# Patient Record
Sex: MALE | Race: WHITE | Employment: OTHER | URBAN - METROPOLITAN AREA
[De-identification: names, ages, dates, MRNs, and addresses within clinical notes are randomized per-mention and may not be internally consistent; named-entity substitution may affect disease eponyms.]

---

## 2017-01-11 ENCOUNTER — TELEPHONE (OUTPATIENT)
Dept: OTOLARYNGOLOGY | Facility: CLINIC | Age: 82
End: 2017-01-11

## 2017-01-12 RX ORDER — FLUTICASONE PROPIONATE 50 MCG
2 SPRAY, SUSPENSION (ML) NASAL DAILY
Qty: 1 BOTTLE | Refills: 6 | Status: SHIPPED | OUTPATIENT
Start: 2017-01-12 | End: 2017-07-16

## 2017-02-01 ENCOUNTER — OFFICE VISIT (OUTPATIENT)
Dept: INTERNAL MEDICINE CLINIC | Facility: CLINIC | Age: 82
End: 2017-02-01

## 2017-02-01 VITALS
RESPIRATION RATE: 16 BRPM | WEIGHT: 181 LBS | DIASTOLIC BLOOD PRESSURE: 71 MMHG | HEART RATE: 76 BPM | BODY MASS INDEX: 25.34 KG/M2 | HEIGHT: 71 IN | SYSTOLIC BLOOD PRESSURE: 136 MMHG

## 2017-02-01 DIAGNOSIS — Z23 NEED FOR VACCINATION: ICD-10-CM

## 2017-02-01 DIAGNOSIS — E78.2 MIXED HYPERLIPIDEMIA: ICD-10-CM

## 2017-02-01 DIAGNOSIS — R80.9 PROTEINURIA, UNSPECIFIED TYPE: ICD-10-CM

## 2017-02-01 DIAGNOSIS — I10 ESSENTIAL HYPERTENSION WITH GOAL BLOOD PRESSURE LESS THAN 140/90: Primary | ICD-10-CM

## 2017-02-01 PROCEDURE — 99214 OFFICE O/P EST MOD 30 MIN: CPT | Performed by: INTERNAL MEDICINE

## 2017-02-01 PROCEDURE — G0463 HOSPITAL OUTPT CLINIC VISIT: HCPCS | Performed by: INTERNAL MEDICINE

## 2017-02-01 PROCEDURE — 90670 PCV13 VACCINE IM: CPT | Performed by: INTERNAL MEDICINE

## 2017-02-01 PROCEDURE — G0009 ADMIN PNEUMOCOCCAL VACCINE: HCPCS | Performed by: INTERNAL MEDICINE

## 2017-02-01 NOTE — PROGRESS NOTES
Cathy Moreau is a 80year old male. HPI:   1. Essential hypertension with goal blood pressure less than 140/90    Patient presents for recheck of his hypertension.  Pt has been taking medications as instructed, no medication side effects, home BP monit capsule Rfl: 1   NIFEdipine ER Osmotic (PROCARDIA XL) 30 MG Oral Tablet 24 Hr  Disp:  Rfl:    Lovastatin 40 MG Oral Tab Take  by mouth. Disp:  Rfl:    atenolol (TENORMIN) 25 MG Oral Tab Take  by mouth.  Disp:  Rfl:    docusate sodium (COLACE) 100 MG Oral Ca masses, HSM or tenderness  EXTREMITIES: no cyanosis, clubbing or edema    ASSESSMENT AND PLAN:   1. Essential hypertension with goal blood pressure less than 140/90    Pt presents for a recheck of his hypertension.  BP is well controlled, no significant med

## 2017-02-17 NOTE — TELEPHONE ENCOUNTER
Dr. Stanley Moyer, Rx requests for Allopurinol 300 mg and Tamsulosin HCL 0.4 mg, UNABLE to fill per protocol. Please review. Thank you.      Refill Protocol Appointment Criteria  · Appointment scheduled in the past 6 months or in the next 3 months  Recent Visit

## 2017-02-18 RX ORDER — ALLOPURINOL 300 MG/1
TABLET ORAL
Qty: 90 TABLET | Refills: 0 | Status: SHIPPED | OUTPATIENT
Start: 2017-02-18 | End: 2017-05-15

## 2017-02-18 RX ORDER — TAMSULOSIN HYDROCHLORIDE 0.4 MG/1
CAPSULE ORAL
Qty: 90 CAPSULE | Refills: 0 | Status: SHIPPED | OUTPATIENT
Start: 2017-02-18 | End: 2017-05-15

## 2017-04-13 ENCOUNTER — PRIOR ORIGINAL RECORDS (OUTPATIENT)
Dept: OTHER | Age: 82
End: 2017-04-13

## 2017-04-13 ENCOUNTER — LAB ENCOUNTER (OUTPATIENT)
Dept: LAB | Facility: HOSPITAL | Age: 82
End: 2017-04-13
Attending: INTERNAL MEDICINE
Payer: MEDICARE

## 2017-04-13 DIAGNOSIS — E78.00 HYPERCHOLESTEROLEMIA: Primary | ICD-10-CM

## 2017-04-13 PROCEDURE — 36415 COLL VENOUS BLD VENIPUNCTURE: CPT

## 2017-04-13 PROCEDURE — 82550 ASSAY OF CK (CPK): CPT

## 2017-04-13 PROCEDURE — 84450 TRANSFERASE (AST) (SGOT): CPT

## 2017-04-13 PROCEDURE — 80061 LIPID PANEL: CPT

## 2017-04-13 PROCEDURE — 84460 ALANINE AMINO (ALT) (SGPT): CPT

## 2017-05-15 ENCOUNTER — OFFICE VISIT (OUTPATIENT)
Dept: INTERNAL MEDICINE CLINIC | Facility: CLINIC | Age: 82
End: 2017-05-15

## 2017-05-15 VITALS
DIASTOLIC BLOOD PRESSURE: 64 MMHG | SYSTOLIC BLOOD PRESSURE: 119 MMHG | HEIGHT: 71 IN | WEIGHT: 179 LBS | BODY MASS INDEX: 25.06 KG/M2 | RESPIRATION RATE: 16 BRPM | HEART RATE: 63 BPM

## 2017-05-15 DIAGNOSIS — I10 ESSENTIAL HYPERTENSION WITH GOAL BLOOD PRESSURE LESS THAN 140/90: Primary | ICD-10-CM

## 2017-05-15 DIAGNOSIS — E78.2 MIXED HYPERLIPIDEMIA: ICD-10-CM

## 2017-05-15 PROCEDURE — G0463 HOSPITAL OUTPT CLINIC VISIT: HCPCS | Performed by: INTERNAL MEDICINE

## 2017-05-15 PROCEDURE — 99214 OFFICE O/P EST MOD 30 MIN: CPT | Performed by: INTERNAL MEDICINE

## 2017-05-15 RX ORDER — TAMSULOSIN HYDROCHLORIDE 0.4 MG/1
CAPSULE ORAL
Qty: 90 CAPSULE | Refills: 0 | Status: CANCELLED | OUTPATIENT
Start: 2017-05-15

## 2017-05-15 RX ORDER — TAMSULOSIN HYDROCHLORIDE 0.4 MG/1
0.4 CAPSULE ORAL DAILY
Qty: 90 CAPSULE | Refills: 3 | Status: SHIPPED | OUTPATIENT
Start: 2017-05-15 | End: 2018-05-14

## 2017-05-15 NOTE — PROGRESS NOTES
Refugio Martinez is a 80year old male. HPI:   1. Essential hypertension with goal blood pressure less than 140/90    Patient presents for recheck of his hypertension.  Pt has been taking medications as instructed, no medication side effects, home BP monit by Nasal route daily. Disp: 1 Bottle Rfl: 6   NIFEdipine ER Osmotic (PROCARDIA XL) 30 MG Oral Tablet 24 Hr  Disp:  Rfl:    Lovastatin 40 MG Oral Tab Take  by mouth. Disp:  Rfl:    atenolol (TENORMIN) 25 MG Oral Tab Take  by mouth.  Disp:  Rfl:    docusate s without murmur  GI: good BS's,no masses, HSM or tenderness  EXTREMITIES: no cyanosis, clubbing or edema    ASSESSMENT AND PLAN:   1.  Essential hypertension with goal blood pressure less than 140/90    Patient instructed to take anti-hypertensive medicines

## 2017-05-16 RX ORDER — ALLOPURINOL 300 MG/1
TABLET ORAL
Qty: 90 TABLET | Refills: 0 | Status: SHIPPED | OUTPATIENT
Start: 2017-05-16 | End: 2017-08-21

## 2017-07-20 RX ORDER — FLUTICASONE PROPIONATE 50 MCG
SPRAY, SUSPENSION (ML) NASAL
Qty: 1 BOTTLE | Refills: 3 | Status: SHIPPED | OUTPATIENT
Start: 2017-07-20 | End: 2019-05-20

## 2017-07-20 NOTE — TELEPHONE ENCOUNTER
Patient calling to check status on medication. States he is completely out and requesting refill from pharmacy 5 days ago. Would like it refilled asap. Please advise. Thank you.

## 2017-08-19 RX ORDER — ALLOPURINOL 300 MG/1
TABLET ORAL
Qty: 90 TABLET | Refills: 0 | Status: CANCELLED | OUTPATIENT
Start: 2017-08-19

## 2017-08-21 ENCOUNTER — OFFICE VISIT (OUTPATIENT)
Dept: INTERNAL MEDICINE CLINIC | Facility: CLINIC | Age: 82
End: 2017-08-21

## 2017-08-21 VITALS
WEIGHT: 176 LBS | HEART RATE: 63 BPM | HEIGHT: 70 IN | BODY MASS INDEX: 25.2 KG/M2 | RESPIRATION RATE: 16 BRPM | DIASTOLIC BLOOD PRESSURE: 66 MMHG | SYSTOLIC BLOOD PRESSURE: 138 MMHG

## 2017-08-21 DIAGNOSIS — E78.2 MIXED HYPERLIPIDEMIA: ICD-10-CM

## 2017-08-21 DIAGNOSIS — D69.6 THROMBOCYTOPENIA (HCC): ICD-10-CM

## 2017-08-21 DIAGNOSIS — I10 ESSENTIAL HYPERTENSION WITH GOAL BLOOD PRESSURE LESS THAN 140/90: Primary | ICD-10-CM

## 2017-08-21 LAB
ALT (SGPT): 15 U/L
AST (SGOT): 24 U/L
CHOLESTEROL, TOTAL: 165 MG/DL
HDL CHOLESTEROL: 57 MG/DL
LDL CHOLESTEROL: 98 MG/DL
NON-HDL CHOLESTEROL: 108 MG/DL
TRIGLYCERIDES: 50 MG/DL

## 2017-08-21 PROCEDURE — G0463 HOSPITAL OUTPT CLINIC VISIT: HCPCS | Performed by: INTERNAL MEDICINE

## 2017-08-21 PROCEDURE — 99214 OFFICE O/P EST MOD 30 MIN: CPT | Performed by: INTERNAL MEDICINE

## 2017-08-21 RX ORDER — OMEPRAZOLE 20 MG/1
20 CAPSULE, DELAYED RELEASE ORAL EVERY MORNING
Qty: 90 CAPSULE | Refills: 3 | Status: SHIPPED | OUTPATIENT
Start: 2017-08-21 | End: 2018-08-20

## 2017-08-21 RX ORDER — ALLOPURINOL 300 MG/1
300 TABLET ORAL DAILY
Qty: 90 TABLET | Refills: 3 | Status: SHIPPED | OUTPATIENT
Start: 2017-08-21 | End: 2018-08-20

## 2017-08-21 NOTE — PROGRESS NOTES
Sherryle Clark is a 80year old male. HPI:   1. Essential hypertension with goal blood pressure less than 140/90    Patient presents for recheck of his hypertension.  Pt has been taking medications as instructed, no medication side effects, home BP monit Take 1 capsule (0.4 mg total) by mouth daily. Disp: 90 capsule Rfl: 3   NIFEdipine ER Osmotic (PROCARDIA XL) 30 MG Oral Tablet 24 Hr  Disp:  Rfl:    Lovastatin 40 MG Oral Tab Take  by mouth. Disp:  Rfl:    atenolol (TENORMIN) 25 MG Oral Tab Take  by mouth. lesions  HEENT: atraumatic, normocephalic,ears and throat are clear  NECK: supple,no adenopathy,no bruits  LUNGS: clear to auscultation  CARDIO: RRR without murmur  GI: good BS's,no masses, HSM or tenderness  EXTREMITIES: no cyanosis, clubbing or edema

## 2017-08-22 ENCOUNTER — PRIOR ORIGINAL RECORDS (OUTPATIENT)
Dept: OTHER | Age: 82
End: 2017-08-22

## 2017-08-28 ENCOUNTER — LAB ENCOUNTER (OUTPATIENT)
Dept: LAB | Facility: HOSPITAL | Age: 82
End: 2017-08-28
Attending: INTERNAL MEDICINE
Payer: MEDICARE

## 2017-08-28 DIAGNOSIS — I10 ESSENTIAL HYPERTENSION WITH GOAL BLOOD PRESSURE LESS THAN 140/90: ICD-10-CM

## 2017-08-28 DIAGNOSIS — E78.2 MIXED HYPERLIPIDEMIA: ICD-10-CM

## 2017-08-28 LAB
ALBUMIN SERPL BCP-MCNC: 3.8 G/DL (ref 3.5–4.8)
ALBUMIN/GLOB SERPL: 1.1 {RATIO} (ref 1–2)
ALP SERPL-CCNC: 67 U/L (ref 32–100)
ALT SERPL-CCNC: 17 U/L (ref 17–63)
ANION GAP SERPL CALC-SCNC: 9 MMOL/L (ref 0–18)
AST SERPL-CCNC: 27 U/L (ref 15–41)
BACTERIA UR QL AUTO: NEGATIVE /HPF
BASOPHILS # BLD: 0.1 K/UL (ref 0–0.2)
BASOPHILS NFR BLD: 1 %
BILIRUB SERPL-MCNC: 1.1 MG/DL (ref 0.3–1.2)
BILIRUB UR QL: NEGATIVE
BUN SERPL-MCNC: 9 MG/DL (ref 8–20)
BUN/CREAT SERPL: 9.3 (ref 10–20)
CALCIUM SERPL-MCNC: 9.6 MG/DL (ref 8.5–10.5)
CHLORIDE SERPL-SCNC: 103 MMOL/L (ref 95–110)
CHOLEST SERPL-MCNC: 157 MG/DL (ref 110–200)
CLARITY UR: CLEAR
CO2 SERPL-SCNC: 27 MMOL/L (ref 22–32)
COLOR UR: YELLOW
CREAT SERPL-MCNC: 0.97 MG/DL (ref 0.5–1.5)
EOSINOPHIL # BLD: 0.4 K/UL (ref 0–0.7)
EOSINOPHIL NFR BLD: 5 %
ERYTHROCYTE [DISTWIDTH] IN BLOOD BY AUTOMATED COUNT: 14.8 % (ref 11–15)
GLOBULIN PLAS-MCNC: 3.5 G/DL (ref 2.5–3.7)
GLUCOSE SERPL-MCNC: 106 MG/DL (ref 70–99)
GLUCOSE UR-MCNC: NEGATIVE MG/DL
HCT VFR BLD AUTO: 46.4 % (ref 41–52)
HDLC SERPL-MCNC: 55 MG/DL
HGB BLD-MCNC: 15.2 G/DL (ref 13.5–17.5)
HGB UR QL STRIP.AUTO: NEGATIVE
HYALINE CASTS #/AREA URNS AUTO: 1 /LPF
KETONES UR-MCNC: NEGATIVE MG/DL
LDLC SERPL CALC-MCNC: 91 MG/DL (ref 0–99)
LEUKOCYTE ESTERASE UR QL STRIP.AUTO: NEGATIVE
LYMPHOCYTES # BLD: 2 K/UL (ref 1–4)
LYMPHOCYTES NFR BLD: 27 %
MCH RBC QN AUTO: 32.1 PG (ref 27–32)
MCHC RBC AUTO-ENTMCNC: 32.8 G/DL (ref 32–37)
MCV RBC AUTO: 97.8 FL (ref 80–100)
MONOCYTES # BLD: 0.7 K/UL (ref 0–1)
MONOCYTES NFR BLD: 10 %
NEUTROPHILS # BLD AUTO: 4.2 K/UL (ref 1.8–7.7)
NEUTROPHILS NFR BLD: 57 %
NITRITE UR QL STRIP.AUTO: NEGATIVE
NONHDLC SERPL-MCNC: 102 MG/DL
OSMOLALITY UR CALC.SUM OF ELEC: 287 MOSM/KG (ref 275–295)
PH UR: 7 [PH] (ref 5–8)
PLATELET # BLD AUTO: 136 K/UL (ref 140–400)
PMV BLD AUTO: 8.9 FL (ref 7.4–10.3)
POTASSIUM SERPL-SCNC: 4.4 MMOL/L (ref 3.3–5.1)
PROT SERPL-MCNC: 7.3 G/DL (ref 5.9–8.4)
PROT UR-MCNC: 30 MG/DL
RBC # BLD AUTO: 4.75 M/UL (ref 4.5–5.9)
RBC #/AREA URNS AUTO: <1 /HPF
SODIUM SERPL-SCNC: 139 MMOL/L (ref 136–144)
SP GR UR STRIP: 1.01 (ref 1–1.03)
TRIGL SERPL-MCNC: 57 MG/DL (ref 1–149)
TSH SERPL-ACNC: 3.58 UIU/ML (ref 0.45–5.33)
UROBILINOGEN UR STRIP-ACNC: <2
VIT C UR-MCNC: NEGATIVE MG/DL
WBC # BLD AUTO: 7.4 K/UL (ref 4–11)
WBC #/AREA URNS AUTO: 1 /HPF

## 2017-08-28 PROCEDURE — 80061 LIPID PANEL: CPT

## 2017-08-28 PROCEDURE — 85025 COMPLETE CBC W/AUTO DIFF WBC: CPT

## 2017-08-28 PROCEDURE — 84443 ASSAY THYROID STIM HORMONE: CPT

## 2017-08-28 PROCEDURE — 36415 COLL VENOUS BLD VENIPUNCTURE: CPT

## 2017-08-28 PROCEDURE — 81001 URINALYSIS AUTO W/SCOPE: CPT

## 2017-08-28 PROCEDURE — 80053 COMPREHEN METABOLIC PANEL: CPT

## 2017-09-09 ENCOUNTER — TELEPHONE (OUTPATIENT)
Dept: OTHER | Age: 82
End: 2017-09-09

## 2017-09-09 NOTE — TELEPHONE ENCOUNTER
----- Message from Christopher Kline MD sent at 9/8/2017 11:08 AM CDT -----  Blood test is good with normal liver, thyroid, urine, lipids and kidney tests. Blood sugar a little up. We'll watch it.  Platelets are better than previous but still a little bel

## 2017-11-20 ENCOUNTER — OFFICE VISIT (OUTPATIENT)
Dept: INTERNAL MEDICINE CLINIC | Facility: CLINIC | Age: 82
End: 2017-11-20

## 2017-11-20 VITALS
BODY MASS INDEX: 24.64 KG/M2 | HEIGHT: 71 IN | DIASTOLIC BLOOD PRESSURE: 70 MMHG | HEART RATE: 67 BPM | RESPIRATION RATE: 16 BRPM | SYSTOLIC BLOOD PRESSURE: 133 MMHG | WEIGHT: 176 LBS

## 2017-11-20 DIAGNOSIS — I10 ESSENTIAL HYPERTENSION WITH GOAL BLOOD PRESSURE LESS THAN 140/90: Primary | ICD-10-CM

## 2017-11-20 DIAGNOSIS — D69.6 THROMBOCYTOPENIA (HCC): ICD-10-CM

## 2017-11-20 DIAGNOSIS — E78.2 MIXED HYPERLIPIDEMIA: ICD-10-CM

## 2017-11-20 PROCEDURE — G0463 HOSPITAL OUTPT CLINIC VISIT: HCPCS | Performed by: INTERNAL MEDICINE

## 2017-11-20 PROCEDURE — 99214 OFFICE O/P EST MOD 30 MIN: CPT | Performed by: INTERNAL MEDICINE

## 2017-11-20 NOTE — PROGRESS NOTES
Santhosh Daughters is a 80year old male. HPI:   1. Essential hypertension with goal blood pressure less than 140/90    Patient presents for recheck of his hypertension.  Pt has been taking medications as instructed, no medication side effects, home BP monit SPRAY(S) IN EACH NOSTRIL ONCE DAILY Disp: 1 Bottle Rfl: 3   tamsulosin HCl 0.4 MG Oral Cap Take 1 capsule (0.4 mg total) by mouth daily.  Disp: 90 capsule Rfl: 3   NIFEdipine ER Osmotic (PROCARDIA XL) 30 MG Oral Tablet 24 Hr  Disp:  Rfl:    Lovastatin 40 MG no apparent distress/ using a rolling walker  SKIN: no rashes,no suspicious lesions  HEENT: atraumatic, normocephalic,ears and throat are clear  NECK: supple,no adenopathy,no bruits  LUNGS: clear to auscultation  CARDIO: RRR without murmur  GI: good BS's,n

## 2017-11-21 ENCOUNTER — TELEPHONE (OUTPATIENT)
Dept: INTERNAL MEDICINE CLINIC | Facility: CLINIC | Age: 82
End: 2017-11-21

## 2018-03-05 ENCOUNTER — OFFICE VISIT (OUTPATIENT)
Dept: INTERNAL MEDICINE CLINIC | Facility: CLINIC | Age: 83
End: 2018-03-05

## 2018-03-05 VITALS
RESPIRATION RATE: 16 BRPM | BODY MASS INDEX: 25.06 KG/M2 | HEIGHT: 71 IN | DIASTOLIC BLOOD PRESSURE: 69 MMHG | WEIGHT: 179 LBS | SYSTOLIC BLOOD PRESSURE: 127 MMHG | HEART RATE: 76 BPM

## 2018-03-05 DIAGNOSIS — Z23 NEED FOR VACCINATION: ICD-10-CM

## 2018-03-05 DIAGNOSIS — I10 ESSENTIAL HYPERTENSION WITH GOAL BLOOD PRESSURE LESS THAN 140/90: Primary | ICD-10-CM

## 2018-03-05 DIAGNOSIS — D69.6 THROMBOCYTOPENIA (HCC): ICD-10-CM

## 2018-03-05 DIAGNOSIS — E78.2 MIXED HYPERLIPIDEMIA: ICD-10-CM

## 2018-03-05 PROCEDURE — 90732 PPSV23 VACC 2 YRS+ SUBQ/IM: CPT | Performed by: INTERNAL MEDICINE

## 2018-03-05 PROCEDURE — 99214 OFFICE O/P EST MOD 30 MIN: CPT | Performed by: INTERNAL MEDICINE

## 2018-03-05 PROCEDURE — G0463 HOSPITAL OUTPT CLINIC VISIT: HCPCS | Performed by: INTERNAL MEDICINE

## 2018-03-05 PROCEDURE — G0009 ADMIN PNEUMOCOCCAL VACCINE: HCPCS | Performed by: INTERNAL MEDICINE

## 2018-03-05 NOTE — PROGRESS NOTES
Ayana Medrano is a 80year old male. HPI:   1. Essential hypertension with goal blood pressure less than 140/90    Patient presents for recheck of his hypertension.  Pt has been taking medications as instructed, no medication side effects, home BP monit USE TWO SPRAY(S) IN EACH NOSTRIL ONCE DAILY Disp: 1 Bottle Rfl: 3   tamsulosin HCl 0.4 MG Oral Cap Take 1 capsule (0.4 mg total) by mouth daily.  Disp: 90 capsule Rfl: 3   NIFEdipine ER Osmotic (PROCARDIA XL) 30 MG Oral Tablet 24 Hr  Disp:  Rfl:    Lisa Farias nourished,in no apparent distress/ using a rolling walker  SKIN: no rashes,no suspicious lesions  HEENT: atraumatic, normocephalic,ears and throat are clear  NECK: supple,no adenopathy,no bruits  LUNGS: clear to auscultation  CARDIO: RRR without murmur  GI

## 2018-03-12 ENCOUNTER — TELEPHONE (OUTPATIENT)
Dept: INTERNAL MEDICINE CLINIC | Facility: CLINIC | Age: 83
End: 2018-03-12

## 2018-03-12 NOTE — TELEPHONE ENCOUNTER
Spoke with patient (name and  verified), reviewed information, patient verbalized understanding and agrees with plan  Patient calling to ask if there is an open order for CBC.  Chart reviewed, CBC ordered 3/5/18, advised patient order is open and no fast

## 2018-05-15 RX ORDER — TAMSULOSIN HYDROCHLORIDE 0.4 MG/1
CAPSULE ORAL
Qty: 90 CAPSULE | Refills: 3 | Status: SHIPPED | OUTPATIENT
Start: 2018-05-15 | End: 2019-05-29

## 2018-08-06 ENCOUNTER — LAB ENCOUNTER (OUTPATIENT)
Dept: LAB | Facility: HOSPITAL | Age: 83
End: 2018-08-06
Attending: INTERNAL MEDICINE
Payer: MEDICARE

## 2018-08-06 ENCOUNTER — OFFICE VISIT (OUTPATIENT)
Dept: INTERNAL MEDICINE CLINIC | Facility: CLINIC | Age: 83
End: 2018-08-06
Payer: MEDICARE

## 2018-08-06 VITALS
HEART RATE: 56 BPM | DIASTOLIC BLOOD PRESSURE: 71 MMHG | RESPIRATION RATE: 16 BRPM | WEIGHT: 174 LBS | HEIGHT: 71 IN | BODY MASS INDEX: 24.36 KG/M2 | SYSTOLIC BLOOD PRESSURE: 132 MMHG

## 2018-08-06 DIAGNOSIS — D69.6 THROMBOCYTOPENIA (HCC): ICD-10-CM

## 2018-08-06 DIAGNOSIS — E78.2 MIXED HYPERLIPIDEMIA: ICD-10-CM

## 2018-08-06 DIAGNOSIS — I10 ESSENTIAL HYPERTENSION WITH GOAL BLOOD PRESSURE LESS THAN 140/90: Primary | ICD-10-CM

## 2018-08-06 LAB
BASOPHILS # BLD: 0.1 K/UL (ref 0–0.2)
BASOPHILS NFR BLD: 1 %
EOSINOPHIL # BLD: 0.4 K/UL (ref 0–0.7)
EOSINOPHIL NFR BLD: 4 %
ERYTHROCYTE [DISTWIDTH] IN BLOOD BY AUTOMATED COUNT: 15.1 % (ref 11–15)
HCT VFR BLD AUTO: 45.1 % (ref 41–52)
HGB BLD-MCNC: 14.7 G/DL (ref 13.5–17.5)
LYMPHOCYTES # BLD: 2.1 K/UL (ref 1–4)
LYMPHOCYTES NFR BLD: 24 %
MCH RBC QN AUTO: 32 PG (ref 27–32)
MCHC RBC AUTO-ENTMCNC: 32.6 G/DL (ref 32–37)
MCV RBC AUTO: 98.2 FL (ref 80–100)
MONOCYTES # BLD: 1 K/UL (ref 0–1)
MONOCYTES NFR BLD: 11 %
NEUTROPHILS # BLD AUTO: 5.2 K/UL (ref 1.8–7.7)
NEUTROPHILS NFR BLD: 60 %
PLATELET # BLD AUTO: 123 K/UL (ref 140–400)
PMV BLD AUTO: 8.9 FL (ref 7.4–10.3)
RBC # BLD AUTO: 4.59 M/UL (ref 4.5–5.9)
WBC # BLD AUTO: 8.6 K/UL (ref 4–11)

## 2018-08-06 PROCEDURE — 99214 OFFICE O/P EST MOD 30 MIN: CPT | Performed by: INTERNAL MEDICINE

## 2018-08-06 PROCEDURE — 85025 COMPLETE CBC W/AUTO DIFF WBC: CPT

## 2018-08-06 PROCEDURE — 36415 COLL VENOUS BLD VENIPUNCTURE: CPT

## 2018-08-06 PROCEDURE — G0463 HOSPITAL OUTPT CLINIC VISIT: HCPCS | Performed by: INTERNAL MEDICINE

## 2018-08-06 NOTE — PROGRESS NOTES
Sherryle Clark is a 80year old male. HPI:   1. Essential hypertension with goal blood pressure less than 140/90    Patient presents for recheck of his hypertension.  Pt has been taking medications as instructed, no medication side effects, home BP monit mouth every morning.  Disp: 90 capsule Rfl: 3   FLUTICASONE PROPIONATE 50 MCG/ACT Nasal Suspension USE TWO SPRAY(S) IN EACH NOSTRIL ONCE DAILY Disp: 1 Bottle Rfl: 3   NIFEdipine ER Osmotic (PROCARDIA XL) 30 MG Oral Tablet 24 Hr  Disp:  Rfl:    Lovastatin 40 suspicious lesions  HEENT: atraumatic, normocephalic,ears and throat are clear  NECK: supple,no adenopathy,no bruits  LUNGS: clear to auscultation  CARDIO: RRR without murmur  GI: good BS's,no masses, HSM or tenderness  EXTREMITIES: no cyanosis, clubbing o

## 2018-08-20 RX ORDER — OMEPRAZOLE 20 MG/1
CAPSULE, DELAYED RELEASE ORAL
Qty: 90 CAPSULE | Refills: 3 | Status: SHIPPED | OUTPATIENT
Start: 2018-08-20 | End: 2019-07-06

## 2018-08-20 RX ORDER — ALLOPURINOL 300 MG/1
TABLET ORAL
Qty: 90 TABLET | Refills: 3 | Status: SHIPPED | OUTPATIENT
Start: 2018-08-20 | End: 2019-09-02

## 2018-08-24 ENCOUNTER — PRIOR ORIGINAL RECORDS (OUTPATIENT)
Dept: OTHER | Age: 83
End: 2018-08-24

## 2018-08-24 ENCOUNTER — APPOINTMENT (OUTPATIENT)
Dept: LAB | Facility: HOSPITAL | Age: 83
End: 2018-08-24
Attending: INTERNAL MEDICINE
Payer: MEDICARE

## 2018-08-24 DIAGNOSIS — E78.00 PURE HYPERCHOLESTEROLEMIA: ICD-10-CM

## 2018-08-24 DIAGNOSIS — I25.119 ATHEROSCLEROSIS OF NATIVE CORONARY ARTERY OF NATIVE HEART WITH ANGINA PECTORIS (HCC): ICD-10-CM

## 2018-08-24 LAB
ALT SERPL-CCNC: 22 U/L (ref 17–63)
ANION GAP SERPL CALC-SCNC: 8 MMOL/L (ref 0–18)
AST SERPL-CCNC: 39 U/L (ref 15–41)
BUN SERPL-MCNC: 20 MG/DL (ref 8–20)
BUN/CREAT SERPL: 19.4 (ref 10–20)
CALCIUM SERPL-MCNC: 9.4 MG/DL (ref 8.5–10.5)
CHLORIDE SERPL-SCNC: 102 MMOL/L (ref 95–110)
CHOLEST SERPL-MCNC: 135 MG/DL (ref 110–200)
CK SERPL-CCNC: 97 U/L (ref 49–397)
CO2 SERPL-SCNC: 30 MMOL/L (ref 22–32)
CREAT SERPL-MCNC: 1.03 MG/DL (ref 0.5–1.5)
GLUCOSE SERPL-MCNC: 95 MG/DL (ref 70–99)
HDLC SERPL-MCNC: 48 MG/DL
LDLC SERPL CALC-MCNC: 79 MG/DL (ref 0–99)
NONHDLC SERPL-MCNC: 87 MG/DL
OSMOLALITY UR CALC.SUM OF ELEC: 292 MOSM/KG (ref 275–295)
POTASSIUM SERPL-SCNC: 4.3 MMOL/L (ref 3.3–5.1)
SODIUM SERPL-SCNC: 140 MMOL/L (ref 136–144)
TRIGL SERPL-MCNC: 42 MG/DL (ref 1–149)

## 2018-08-24 PROCEDURE — 80061 LIPID PANEL: CPT

## 2018-08-24 PROCEDURE — 80048 BASIC METABOLIC PNL TOTAL CA: CPT

## 2018-08-24 PROCEDURE — 84460 ALANINE AMINO (ALT) (SGPT): CPT

## 2018-08-24 PROCEDURE — 84450 TRANSFERASE (AST) (SGOT): CPT

## 2018-08-24 PROCEDURE — 36415 COLL VENOUS BLD VENIPUNCTURE: CPT

## 2018-08-24 PROCEDURE — 82550 ASSAY OF CK (CPK): CPT

## 2018-08-27 LAB
ALT (SGPT): 22 U/L
AST (SGOT): 39 U/L
BUN: 20 MG/DL
CALCIUM: 9.4 MG/DL
CHLORIDE: 102 MEQ/L
CHOLESTEROL, TOTAL: 135 MG/DL
CREATININE KINASE: 97 U/L
CREATININE, SERUM: 1.03 MG/DL
GLUCOSE: 95 MG/DL
GLUCOSE: 95 MG/DL
HDL CHOLESTEROL: 48 MG/DL
LDL CHOLESTEROL: 79 MG/DL
NON-HDL CHOLESTEROL: 87 MG/DL
POTASSIUM, SERUM: 4.3 MEQ/L
SGOT (AST): 39 IU/L
SGPT (ALT): 22 IU/L
SODIUM: 140 MEQ/L
TRIGLYCERIDES: 42 MG/DL

## 2018-08-29 ENCOUNTER — PRIOR ORIGINAL RECORDS (OUTPATIENT)
Dept: OTHER | Age: 83
End: 2018-08-29

## 2018-10-05 ENCOUNTER — PRIOR ORIGINAL RECORDS (OUTPATIENT)
Dept: OTHER | Age: 83
End: 2018-10-05

## 2018-10-05 ENCOUNTER — MYAURORA ACCOUNT LINK (OUTPATIENT)
Dept: OTHER | Age: 83
End: 2018-10-05

## 2018-12-10 ENCOUNTER — OFFICE VISIT (OUTPATIENT)
Dept: INTERNAL MEDICINE CLINIC | Facility: CLINIC | Age: 83
End: 2018-12-10
Payer: MEDICARE

## 2018-12-10 ENCOUNTER — LAB ENCOUNTER (OUTPATIENT)
Dept: LAB | Facility: HOSPITAL | Age: 83
End: 2018-12-10
Attending: INTERNAL MEDICINE
Payer: MEDICARE

## 2018-12-10 VITALS
DIASTOLIC BLOOD PRESSURE: 72 MMHG | SYSTOLIC BLOOD PRESSURE: 121 MMHG | BODY MASS INDEX: 25.91 KG/M2 | HEART RATE: 59 BPM | HEIGHT: 70 IN | RESPIRATION RATE: 16 BRPM | WEIGHT: 181 LBS

## 2018-12-10 DIAGNOSIS — I10 ESSENTIAL HYPERTENSION WITH GOAL BLOOD PRESSURE LESS THAN 140/90: ICD-10-CM

## 2018-12-10 DIAGNOSIS — D69.6 THROMBOCYTOPENIA (HCC): ICD-10-CM

## 2018-12-10 DIAGNOSIS — E78.2 MIXED HYPERLIPIDEMIA: ICD-10-CM

## 2018-12-10 DIAGNOSIS — H61.21 IMPACTED CERUMEN OF RIGHT EAR: ICD-10-CM

## 2018-12-10 DIAGNOSIS — I10 ESSENTIAL HYPERTENSION WITH GOAL BLOOD PRESSURE LESS THAN 140/90: Primary | ICD-10-CM

## 2018-12-10 PROCEDURE — 69210 REMOVE IMPACTED EAR WAX UNI: CPT | Performed by: INTERNAL MEDICINE

## 2018-12-10 PROCEDURE — 85025 COMPLETE CBC W/AUTO DIFF WBC: CPT

## 2018-12-10 PROCEDURE — 84443 ASSAY THYROID STIM HORMONE: CPT

## 2018-12-10 PROCEDURE — 36415 COLL VENOUS BLD VENIPUNCTURE: CPT

## 2018-12-10 PROCEDURE — G0463 HOSPITAL OUTPT CLINIC VISIT: HCPCS | Performed by: INTERNAL MEDICINE

## 2018-12-10 PROCEDURE — 80053 COMPREHEN METABOLIC PANEL: CPT

## 2018-12-10 PROCEDURE — 99214 OFFICE O/P EST MOD 30 MIN: CPT | Performed by: INTERNAL MEDICINE

## 2018-12-10 NOTE — PROGRESS NOTES
Betina Mauricio is a 80year old male. HPI:   1. Essential hypertension with goal blood pressure less than 140/90    Patient presents for recheck of his hypertension.  Pt has been taking medications as instructed, no medication side effects, home BP monit CAPSULE BY MOUTH ONCE DAILY Disp: 90 capsule Rfl: 3   FLUTICASONE PROPIONATE 50 MCG/ACT Nasal Suspension USE TWO SPRAY(S) IN EACH NOSTRIL ONCE DAILY Disp: 1 Bottle Rfl: 3   NIFEdipine ER Osmotic (PROCARDIA XL) 30 MG Oral Tablet 24 Hr  Disp:  Rfl:    Juan A Gandara apparent distress/ using a rolling walker  SKIN: no rashes,no suspicious lesions  HEENT: atraumatic, normocephalic, throat is clear  EARS: Right cerumen impaction  NECK: supple,no adenopathy,no bruits  LUNGS: clear to auscultation  CARDIO: RRR without murm months

## 2018-12-12 ENCOUNTER — TELEPHONE (OUTPATIENT)
Dept: INTERNAL MEDICINE CLINIC | Facility: CLINIC | Age: 83
End: 2018-12-12

## 2018-12-12 NOTE — TELEPHONE ENCOUNTER
Patient returned call, advised of notes below per Dr NAILS, with understanding. Also advise results have been mailed out, as requested. Notes recorded by Aiyana Franco RN on 12/11/2018 at 2:55 PM CST  Left message on home phone for patient to call back.

## 2019-01-01 ENCOUNTER — EXTERNAL RECORD (OUTPATIENT)
Dept: HEALTH INFORMATION MANAGEMENT | Facility: OTHER | Age: 84
End: 2019-01-01

## 2019-02-13 ENCOUNTER — NURSE TRIAGE (OUTPATIENT)
Dept: OTHER | Age: 84
End: 2019-02-13

## 2019-02-13 NOTE — TELEPHONE ENCOUNTER
Action Requested: Summary for Provider     []  Critical Lab, Recommendations Needed  [] Need Additional Advice  []   FYI    []   Need Orders  [] Need Medications Sent to Pharmacy  []  Other     SUMMARY: pt asked to have doctor call him today, pt is having

## 2019-02-14 ENCOUNTER — HOSPITAL ENCOUNTER (EMERGENCY)
Facility: HOSPITAL | Age: 84
Discharge: HOME | End: 2019-02-14
Attending: EMERGENCY MEDICINE
Payer: MEDICARE

## 2019-02-14 ENCOUNTER — APPOINTMENT (OUTPATIENT)
Dept: GENERAL RADIOLOGY | Facility: HOSPITAL | Age: 84
End: 2019-02-14
Attending: EMERGENCY MEDICINE
Payer: MEDICARE

## 2019-02-14 VITALS
TEMPERATURE: 98 F | RESPIRATION RATE: 14 BRPM | OXYGEN SATURATION: 100 % | SYSTOLIC BLOOD PRESSURE: 173 MMHG | BODY MASS INDEX: 26 KG/M2 | WEIGHT: 181 LBS | HEART RATE: 56 BPM | DIASTOLIC BLOOD PRESSURE: 67 MMHG

## 2019-02-14 DIAGNOSIS — M54.31 SCIATICA OF RIGHT SIDE: Primary | ICD-10-CM

## 2019-02-14 PROCEDURE — 99283 EMERGENCY DEPT VISIT LOW MDM: CPT

## 2019-02-14 PROCEDURE — 73502 X-RAY EXAM HIP UNI 2-3 VIEWS: CPT | Performed by: EMERGENCY MEDICINE

## 2019-02-14 RX ORDER — METHYLPREDNISOLONE 4 MG/1
TABLET ORAL
Qty: 1 PACKAGE | Refills: 0 | Status: SHIPPED | OUTPATIENT
Start: 2019-02-14 | End: 2019-02-19

## 2019-02-14 NOTE — ED INITIAL ASSESSMENT (HPI)
C/o right hip pain, radiates to back and into groin. Hard to lay flat in bed, has trouble walking. Started approx 2 weeks ago, getting worse.

## 2019-02-14 NOTE — ED PROVIDER NOTES
Patient Seen in: [facility] AND [facility] Emergency Department    History   Patient presents with:  Lower Extremity Injury (musculoskeletal)    Stated Complaint: R pelvic pain x2 weeks    HPI    80-year-old male with history of hypertension, cryptic cirrhosis, ED Triage Vitals [02/14/19 1110]   BP (!) 173/67   Pulse 56   Resp 14   Temp 97.8 °F (36.6 °C)   Temp src Temporal   SpO2 100 %   O2 Device        Current:BP (!) 173/67   Pulse 56   Temp 97.8 °F (36.6 °C) (Temporal)   Resp 14   Wt 82.1 kg   SpO2 100% schedule follow up care with a primary care provider within the next three months to obtain basic health screening including reassessment of your blood pressure.     Medications Prescribed:  Current Discharge Medication List    START taking these medication

## 2019-02-19 PROBLEM — M16.11 PRIMARY OSTEOARTHRITIS OF RIGHT HIP: Status: ACTIVE | Noted: 2019-02-19

## 2019-02-19 PROBLEM — M70.61 TROCHANTERIC BURSITIS OF RIGHT HIP: Status: ACTIVE | Noted: 2019-02-19

## 2019-02-21 ENCOUNTER — OFFICE VISIT (OUTPATIENT)
Dept: DERMATOLOGY CLINIC | Facility: CLINIC | Age: 84
End: 2019-02-21
Payer: MEDICARE

## 2019-02-21 DIAGNOSIS — D48.5 NEOPLASM OF UNCERTAIN BEHAVIOR OF SKIN: ICD-10-CM

## 2019-02-21 DIAGNOSIS — L57.0 ACTINIC KERATOSIS: Primary | ICD-10-CM

## 2019-02-21 DIAGNOSIS — Z85.828 PERSONAL HISTORY OF SKIN CANCER: ICD-10-CM

## 2019-02-21 DIAGNOSIS — L82.1 SEBORRHEIC KERATOSES: ICD-10-CM

## 2019-02-21 PROCEDURE — 17000 DESTRUCT PREMALG LESION: CPT | Performed by: DERMATOLOGY

## 2019-02-21 PROCEDURE — 88305 TISSUE EXAM BY PATHOLOGIST: CPT | Performed by: DERMATOLOGY

## 2019-02-21 PROCEDURE — 99202 OFFICE O/P NEW SF 15 MIN: CPT | Performed by: DERMATOLOGY

## 2019-02-21 PROCEDURE — 17003 DESTRUCT PREMALG LES 2-14: CPT | Performed by: DERMATOLOGY

## 2019-02-21 PROCEDURE — 11103 TANGNTL BX SKIN EA SEP/ADDL: CPT | Performed by: DERMATOLOGY

## 2019-02-21 PROCEDURE — 11102 TANGNTL BX SKIN SINGLE LES: CPT | Performed by: DERMATOLOGY

## 2019-02-21 NOTE — PROGRESS NOTES
Past Medical History:   Diagnosis Date   • Bilateral inguinal hernia     observation   • Cancer of kidney ([facility] [address] 2005   • Cholecystitis 2013    NPO, IV antibiotics   • Cirrhosis ([facility] [address] 2013    per NG: cryptic cirrhosis; observation   • Diverticulosis     die Intimate partner violence:        Fear of current or ex partner: Not on file        Emotionally abused: Not on file        Physically abused: Not on file        Forced sexual activity: Not on file    Other Topics      Concerns:         Service: Not

## 2019-02-21 NOTE — PROGRESS NOTES
HPI:     Chief Complaint     Lesion        HPI     Lesion      Additional comments: New Patient present with red scaley lesion on right side of temple . Patient noticed lesion 6 month ago. Patient denies any change in soap .  Patient has hx of sc by mouth.  Disp:  Rfl:      Allergies:   No Known Allergies    Past Medical History:   Diagnosis Date   • Bilateral inguinal hernia     observation   • Cancer of kidney ([facility] 2005   • Cholecystitis 2013    NPO, IV antibiotics   • Cirrhosis ([facility] 2013    pe organizations: Not on file        Relationship status: Not on file      Intimate partner violence:        Fear of current or ex partner: Not on file        Emotionally abused: Not on file        Physically abused: Not on file        Forced sexual activity: lesion      ASSESSMENT/PLAN:   Actinic keratosis  (primary encounter diagnosis)-lesion helix and antihelix treated with cryotherapy.   Diagnosis discussed–post cryo directions given  Neoplasm of uncertain behavior of skin-rule out squamous cell carcinoma ve

## 2019-02-23 PROBLEM — Z96.642 HISTORY OF TOTAL LEFT HIP REPLACEMENT: Status: ACTIVE | Noted: 2019-02-23

## 2019-02-23 PROBLEM — M54.31 SCIATICA OF RIGHT SIDE: Status: ACTIVE | Noted: 2019-02-23

## 2019-02-25 NOTE — PROGRESS NOTES
Patient's dauhghter and Pt both informed of test results and all LSS' recommendations. Voiced understanding. Path report faxed to Dr. Harpal Nation and appt made for [facility]. Results logged in.

## 2019-02-28 VITALS
DIASTOLIC BLOOD PRESSURE: 50 MMHG | SYSTOLIC BLOOD PRESSURE: 136 MMHG | HEART RATE: 67 BPM | HEIGHT: 70 IN | BODY MASS INDEX: 25.34 KG/M2 | WEIGHT: 177 LBS | OXYGEN SATURATION: 98 %

## 2019-02-28 VITALS — DIASTOLIC BLOOD PRESSURE: 56 MMHG | SYSTOLIC BLOOD PRESSURE: 148 MMHG | WEIGHT: 172 LBS | HEART RATE: 68 BPM

## 2019-03-07 PROBLEM — M48.062 SPINAL STENOSIS OF LUMBAR REGION WITH NEUROGENIC CLAUDICATION: Status: ACTIVE | Noted: 2019-03-07

## 2019-03-12 ENCOUNTER — OFFICE VISIT (OUTPATIENT)
Dept: OTOLARYNGOLOGY | Facility: CLINIC | Age: 84
End: 2019-03-12
Payer: MEDICARE

## 2019-03-12 VITALS
HEIGHT: 70.5 IN | TEMPERATURE: 98 F | SYSTOLIC BLOOD PRESSURE: 138 MMHG | BODY MASS INDEX: 26 KG/M2 | DIASTOLIC BLOOD PRESSURE: 74 MMHG

## 2019-03-12 DIAGNOSIS — C44.202 CANCER OF SKIN OF RIGHT EAR: ICD-10-CM

## 2019-03-12 DIAGNOSIS — C44.309: Primary | ICD-10-CM

## 2019-03-12 PROCEDURE — G0463 HOSPITAL OUTPT CLINIC VISIT: HCPCS | Performed by: OTOLARYNGOLOGY

## 2019-03-12 PROCEDURE — 99214 OFFICE O/P EST MOD 30 MIN: CPT | Performed by: OTOLARYNGOLOGY

## 2019-03-12 NOTE — PROGRESS NOTES
Idania Garza is a 80year old male.   Patient presents with:  Lesion: right earlobe, biopsy done on 2-21-19 by Dr Brenda Ballard showed basal cell carcinoma       HISTORY OF PRESENT ILLNESS  He presents with a history of a lesion of the right ear and temple biop • Unspecified essential hypertension 2009       Past Surgical History:   Procedure Laterality Date   • CARPAL TUNNEL RELEASE Right 2009   • ELECTROCARDIOGRAM, COMPLETE  4-    scanned to media tab   • HIP REPLACEMENT SURGERY Left 1/5/2000    total temple from a previous biopsy. 0.8 cm area of erythema of the right ear from previous biopsy site. Lymph Detail Normal Submental. Submandibular. Anterior cervical. Posterior cervical. Supraclavicular.         Nose/Mouth/Throat Normal External nose - reconstruction. I did offer a quick excision and closure under local anesthetic as well while I am waiting for the results of his frozen section analysis. We will go ahead and set up with both of these to be done at his convenience in the near future.   Elvis Bass

## 2019-03-18 ENCOUNTER — OFFICE VISIT (OUTPATIENT)
Dept: INTERNAL MEDICINE CLINIC | Facility: CLINIC | Age: 84
End: 2019-03-18
Payer: MEDICARE

## 2019-03-18 ENCOUNTER — LAB ENCOUNTER (OUTPATIENT)
Dept: LAB | Facility: HOSPITAL | Age: 84
End: 2019-03-18
Attending: INTERNAL MEDICINE
Payer: MEDICARE

## 2019-03-18 VITALS
HEIGHT: 70 IN | HEART RATE: 76 BPM | DIASTOLIC BLOOD PRESSURE: 74 MMHG | WEIGHT: 164 LBS | SYSTOLIC BLOOD PRESSURE: 132 MMHG | RESPIRATION RATE: 16 BRPM | BODY MASS INDEX: 23.48 KG/M2

## 2019-03-18 DIAGNOSIS — R41.3 MEMORY LOSS: ICD-10-CM

## 2019-03-18 DIAGNOSIS — R63.4 RECENT WEIGHT LOSS: ICD-10-CM

## 2019-03-18 DIAGNOSIS — E78.2 MIXED HYPERLIPIDEMIA: ICD-10-CM

## 2019-03-18 DIAGNOSIS — M54.41 CHRONIC MIDLINE LOW BACK PAIN WITH RIGHT-SIDED SCIATICA: ICD-10-CM

## 2019-03-18 DIAGNOSIS — I10 ESSENTIAL HYPERTENSION WITH GOAL BLOOD PRESSURE LESS THAN 140/90: Primary | ICD-10-CM

## 2019-03-18 DIAGNOSIS — G89.29 CHRONIC MIDLINE LOW BACK PAIN WITH RIGHT-SIDED SCIATICA: ICD-10-CM

## 2019-03-18 LAB
ALBUMIN SERPL-MCNC: 3.7 G/DL (ref 3.4–5)
ALBUMIN/GLOB SERPL: 0.9 {RATIO} (ref 1–2)
ALP LIVER SERPL-CCNC: 114 U/L (ref 45–117)
ALT SERPL-CCNC: 30 U/L (ref 16–61)
ANION GAP SERPL CALC-SCNC: 7 MMOL/L (ref 0–18)
AST SERPL-CCNC: 28 U/L (ref 15–37)
BASOPHILS # BLD AUTO: 0.06 X10(3) UL (ref 0–0.2)
BASOPHILS NFR BLD AUTO: 0.5 %
BILIRUB SERPL-MCNC: 1.2 MG/DL (ref 0.1–2)
BUN BLD-MCNC: 21 MG/DL (ref 7–18)
BUN/CREAT SERPL: 17.2 (ref 10–20)
CALCIUM BLD-MCNC: 9.5 MG/DL (ref 8.5–10.1)
CHLORIDE SERPL-SCNC: 103 MMOL/L (ref 98–107)
CO2 SERPL-SCNC: 28 MMOL/L (ref 21–32)
CREAT BLD-MCNC: 1.22 MG/DL (ref 0.7–1.3)
DEPRECATED RDW RBC AUTO: 50 FL (ref 35.1–46.3)
EOSINOPHIL # BLD AUTO: 0.27 X10(3) UL (ref 0–0.7)
EOSINOPHIL NFR BLD AUTO: 2.3 %
ERYTHROCYTE [DISTWIDTH] IN BLOOD BY AUTOMATED COUNT: 13.9 % (ref 11–15)
GLOBULIN PLAS-MCNC: 4 G/DL (ref 2.8–4.4)
GLUCOSE BLD-MCNC: 96 MG/DL (ref 70–99)
HCT VFR BLD AUTO: 46.9 % (ref 39–53)
HGB BLD-MCNC: 15.7 G/DL (ref 13–17.5)
IMM GRANULOCYTES # BLD AUTO: 0.07 X10(3) UL (ref 0–1)
IMM GRANULOCYTES NFR BLD: 0.6 %
LYMPHOCYTES # BLD AUTO: 2.32 X10(3) UL (ref 1–4)
LYMPHOCYTES NFR BLD AUTO: 19.9 %
M PROTEIN MFR SERPL ELPH: 7.7 G/DL (ref 6.4–8.2)
MCH RBC QN AUTO: 32.5 PG (ref 26–34)
MCHC RBC AUTO-ENTMCNC: 33.5 G/DL (ref 31–37)
MCV RBC AUTO: 97.1 FL (ref 80–100)
MONOCYTES # BLD AUTO: 1.17 X10(3) UL (ref 0.1–1)
MONOCYTES NFR BLD AUTO: 10 %
NEUTROPHILS # BLD AUTO: 7.77 X10 (3) UL (ref 1.5–7.7)
NEUTROPHILS # BLD AUTO: 7.77 X10(3) UL (ref 1.5–7.7)
NEUTROPHILS NFR BLD AUTO: 66.7 %
OSMOLALITY SERPL CALC.SUM OF ELEC: 289 MOSM/KG (ref 275–295)
PLATELET # BLD AUTO: 187 10(3)UL (ref 150–450)
POTASSIUM SERPL-SCNC: 4 MMOL/L (ref 3.5–5.1)
RBC # BLD AUTO: 4.83 X10(6)UL (ref 3.8–5.8)
SODIUM SERPL-SCNC: 138 MMOL/L (ref 136–145)
WBC # BLD AUTO: 11.7 X10(3) UL (ref 4–11)

## 2019-03-18 PROCEDURE — 81003 URINALYSIS AUTO W/O SCOPE: CPT

## 2019-03-18 PROCEDURE — G0463 HOSPITAL OUTPT CLINIC VISIT: HCPCS | Performed by: INTERNAL MEDICINE

## 2019-03-18 PROCEDURE — 80053 COMPREHEN METABOLIC PANEL: CPT

## 2019-03-18 PROCEDURE — 85025 COMPLETE CBC W/AUTO DIFF WBC: CPT

## 2019-03-18 PROCEDURE — 99215 OFFICE O/P EST HI 40 MIN: CPT | Performed by: INTERNAL MEDICINE

## 2019-03-18 PROCEDURE — 36415 COLL VENOUS BLD VENIPUNCTURE: CPT

## 2019-03-18 RX ORDER — DONEPEZIL HYDROCHLORIDE 5 MG/1
5 TABLET, FILM COATED ORAL NIGHTLY
Qty: 90 TABLET | Refills: 3 | Status: ON HOLD | OUTPATIENT
Start: 2019-03-18 | End: 2019-03-29

## 2019-03-18 NOTE — PROGRESS NOTES
Refugio Martinez is a 80year old male. HPI:   1. Essential hypertension with goal blood pressure less than 140/90    Patient presents for recheck of his hypertension.  Pt has been taking medications as instructed, no medication side effects, home BP monit 55   04/13/2017 57     HDL CHOLESTEROL (P) (mg/dL)   Date Value   04/10/2015 52   03/12/2012 74 (H)   12/23/2011 77 (H)     LDL Cholesterol (mg/dL)   Date Value   08/24/2018 79   08/28/2017 91   04/13/2017 98   04/10/2015 84   03/12/2012 94   12/23/2011 84 RELEASE Right 2009   • ELECTROCARDIOGRAM, COMPLETE  4-    scanned to media tab   • HIP REPLACEMENT SURGERY Left 1/5/2000    total   • KIDNEY SURGERY      R partial nephrectomy (15%)   • LAMINECTOMY  2000      Social History:    Social History    Tob pressures at home and bring readings to your next office visit to review. 2. Mixed hyperlipidemia    Patient instructed to take cholesterol lowering medications as prescribed and to continue to follow a low fat, low cholesterol diet as discussed.  Discus

## 2019-03-19 LAB
BACTERIA UR QL AUTO: NEGATIVE /HPF
BILIRUB UR QL: NEGATIVE
CLARITY UR: CLEAR
COLOR UR: YELLOW
GLUCOSE UR-MCNC: NEGATIVE MG/DL
HGB UR QL STRIP.AUTO: NEGATIVE
KETONES UR-MCNC: NEGATIVE MG/DL
LEUKOCYTE ESTERASE UR QL STRIP.AUTO: NEGATIVE
NITRITE UR QL STRIP.AUTO: NEGATIVE
PH UR: 7 [PH] (ref 5–8)
PROT UR-MCNC: NEGATIVE MG/DL
RBC #/AREA URNS AUTO: <1 /HPF
SP GR UR STRIP: 1.01 (ref 1–1.03)
UROBILINOGEN UR STRIP-ACNC: <2
VIT C UR-MCNC: NEGATIVE MG/DL
WBC #/AREA URNS AUTO: 2 /HPF

## 2019-03-28 ENCOUNTER — HOSPITAL ENCOUNTER (OUTPATIENT)
Facility: HOSPITAL | Age: 84
Setting detail: OBSERVATION
Discharge: HOME | End: 2019-03-29
Attending: EMERGENCY MEDICINE | Admitting: HOSPITALIST
Payer: MEDICARE

## 2019-03-28 ENCOUNTER — APPOINTMENT (OUTPATIENT)
Dept: CT IMAGING | Facility: HOSPITAL | Age: 84
End: 2019-03-28
Attending: EMERGENCY MEDICINE
Payer: MEDICARE

## 2019-03-28 DIAGNOSIS — I48.92 ATRIAL FIBRILLATION AND FLUTTER (HCC): ICD-10-CM
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
## 2019-03-28 DIAGNOSIS — I48.91 ATRIAL FIBRILLATION AND FLUTTER (HCC): ICD-10-CM

## 2019-03-28 PROBLEM — R53.1 WEAKNESS: Status: ACTIVE | Noted: 2019-03-28

## 2019-03-28 PROCEDURE — 99220 INITIAL OBSERVATION CARE,LEVL III: CPT | Performed by: HOSPITALIST

## 2019-03-28 PROCEDURE — 70450 CT HEAD/BRAIN W/O DYE: CPT | Performed by: EMERGENCY MEDICINE

## 2019-03-28 RX ORDER — ONDANSETRON 2 MG/ML
4 INJECTION INTRAMUSCULAR; INTRAVENOUS EVERY 6 HOURS PRN
Status: DISCONTINUED | OUTPATIENT
Start: 2019-03-28 | End: 2019-03-29

## 2019-03-28 RX ORDER — POLYETHYLENE GLYCOL 3350 17 G/17G
17 POWDER, FOR SOLUTION ORAL DAILY PRN
Status: ON HOLD | COMMUNITY
End: 2019-09-03 | Stop reason: CLARIF

## 2019-03-28 RX ORDER — ACETAMINOPHEN 325 MG/1
650 TABLET ORAL EVERY 6 HOURS PRN
Status: DISCONTINUED | OUTPATIENT
Start: 2019-03-28 | End: 2019-03-29

## 2019-03-28 RX ORDER — SODIUM CHLORIDE 9 MG/ML
INJECTION, SOLUTION INTRAVENOUS CONTINUOUS
Status: DISCONTINUED | OUTPATIENT
Start: 2019-03-28 | End: 2019-03-29

## 2019-03-28 RX ORDER — ALFUZOSIN HYDROCHLORIDE 10 MG/1
10 TABLET, EXTENDED RELEASE ORAL
Status: DISCONTINUED | OUTPATIENT
Start: 2019-03-29 | End: 2019-03-29

## 2019-03-28 RX ORDER — SODIUM CHLORIDE 0.9 % (FLUSH) 0.9 %
3 SYRINGE (ML) INJECTION AS NEEDED
Status: DISCONTINUED | OUTPATIENT
Start: 2019-03-28 | End: 2019-03-29

## 2019-03-28 RX ORDER — PANTOPRAZOLE SODIUM 20 MG/1
20 TABLET, DELAYED RELEASE ORAL
Status: DISCONTINUED | OUTPATIENT
Start: 2019-03-29 | End: 2019-03-29

## 2019-03-28 RX ORDER — ALLOPURINOL 300 MG/1
300 TABLET ORAL NIGHTLY
Status: DISCONTINUED | OUTPATIENT
Start: 2019-03-28 | End: 2019-03-29

## 2019-03-28 RX ORDER — METOPROLOL SUCCINATE 25 MG/1
25 TABLET, EXTENDED RELEASE ORAL DAILY
Status: ON HOLD | COMMUNITY
End: 2019-03-29

## 2019-03-28 RX ORDER — HEPARIN SODIUM 5000 [USP'U]/ML
5000 INJECTION, SOLUTION INTRAVENOUS; SUBCUTANEOUS EVERY 12 HOURS SCHEDULED
Status: DISCONTINUED | OUTPATIENT
Start: 2019-03-28 | End: 2019-03-29

## 2019-03-28 NOTE — ED PROVIDER NOTES
Patient Seen in: [facility] AND [facility] Emergency Department    History   Patient presents with:  Fall (musculoskeletal, neurologic)    Stated Complaint: Fall    HPI    51-year-old male with history of hypertension, cryptic cirrhosis, and left hip arthroplast Social History    Tobacco Use      Smoking status: Former Smoker        Packs/day: 0.75        Years: 30.00        Pack years: 22.5        Quit date: 1987        Years since quittin.6      Smokeless tobacco: Never Used    Alcohol use: No CULTURE REFLEX - Abnormal; Notable for the following components:    Protein Urine 30  (*)     Blood Urine Small (*)     Urobilinogen Urine 4.0 (*)     RBC URINE 4 (*)     All other components within normal limits   CK CREATINE KINASE (NOT CREATININE) - Abn follow-up provider specified. We recommend that you schedule follow up care with a primary care provider within the next three months to obtain basic health screening including reassessment of your blood pressure.     Medications Prescribed:  Current Disch

## 2019-03-28 NOTE — CONSULTS
[facility]    PATIENT'S NAME: Lucy Patel   ATTENDING PHYSICIAN: Catrachito Andrews MD   CONSULTING PHYSICIAN: Racheal Mayberry.  Clair Munoz MD   PATIENT ACCOUNT#:   [id number]    LOCATION:  [address] [place] [address] #:   [id number]       DATE OF BIRTH:  [birth year] , having lost his wife a little less than 1 year ago. He has children, 3 of whom are in the ER with him today. Quit smoking many years ago. Drinks rare caffeine and no alcohol. He does not get much exercise.   His activity is limited by ataxia fo time and so anticoagulation will be withheld. The patient will be admitted to the hospital to be monitored on telemetry. We will check orthostatic blood pressures and obtain an echocardiogram to reassess the aortic stenosis and LV function.     If there i

## 2019-03-28 NOTE — ED INITIAL ASSESSMENT (HPI)
Patient c/o of fall at home, fell on right shoulder, right hip, and buttocks. Denies blood thinner use, hitting head, denies complaints at this time.

## 2019-03-28 NOTE — CONSULTS
Cardiology (consult dictated)    Assessment:  1. Near syncope with fall. No serious injury. 2.  New atrial fibrillation. Heart rate controlled here in the ER. 3.  History of nonobstructive CAD. Negative troponin and no angina.     4.  Aortic steno

## 2019-03-28 NOTE — H&P
[facility]    PATIENT'S NAME: Farshad Sacha   ATTENDING PHYSICIAN: Emperatriz Burgos MD   PATIENT ACCOUNT#:   [id number]    LOCATION:  [place] [address]  MEDICAL RECORD #:   [id number]       YOB: 1926  ADMISSION DATE:       03/28/201 He is not able to give me a detailed description of what actually happened but he did not lose consciousness. He did not sustain major trauma to his head. Recently, he switched his metoprolol from taking it at bedtime to taking it in the morning.   Eitan

## 2019-03-28 NOTE — HISTORICAL OFFICE NOTE
Eamon Dao MD - 10/05/2018 12:00 AM CDT  Mike Sinha  : 1926  ACCOUNT: [id number]  [phone]  PCP: Dr. Willie Zavala    TODAY'S DATE: 10/05/2018  DICTATED BY: [Dr. Frannie Simon    HPI:   [On 10/05/2018, Sho Enriquez, a 80year old male, prese mucosa pink and moist. NECK: jugular venous pressure not elevated. RESP: respirations with normal rate and rhythm, clear to auscultation. GI: no masses, tenderness or hepatosplenomegaly, rectal deferred. MS: inadequate gait for exercise/testing.  EXT: no cl

## 2019-03-28 NOTE — PLAN OF CARE
Problem: CARDIOVASCULAR - ADULT  Goal: Maintains optimal cardiac output and hemodynamic stability  INTERVENTIONS:  - Monitor vital signs, rhythm, and trends  - Monitor for bleeding, hypotension and signs of decreased cardiac output  - Evaluate effectivenes Mobility  Goal: Achieve highest/safest level of mobility/gait  Interventions:  - Assess patient's functional ability and stability  - Promote increasing activity/tolerance for mobility and gait  - Educate and engage patient/family in tolerated activity lev

## 2019-03-28 NOTE — ED NOTES
Orders for admission, patient is aware of plan and ready to go upstairs.  Any questions, please call ED RN kayla at extension [phone]

## 2019-03-29 ENCOUNTER — TELEPHONE (OUTPATIENT)
Dept: INTERNAL MEDICINE CLINIC | Facility: CLINIC | Age: 84
End: 2019-03-29

## 2019-03-29 ENCOUNTER — APPOINTMENT (OUTPATIENT)
Dept: CV DIAGNOSTICS | Facility: HOSPITAL | Age: 84
End: 2019-03-29
Attending: HOSPITALIST
Payer: MEDICARE

## 2019-03-29 VITALS
SYSTOLIC BLOOD PRESSURE: 137 MMHG | WEIGHT: 160.13 LBS | DIASTOLIC BLOOD PRESSURE: 65 MMHG | HEIGHT: 70 IN | BODY MASS INDEX: 22.92 KG/M2 | RESPIRATION RATE: 20 BRPM | HEART RATE: 65 BPM | OXYGEN SATURATION: 99 % | TEMPERATURE: 97 F

## 2019-03-29 PROCEDURE — 93306 TTE W/DOPPLER COMPLETE: CPT | Performed by: HOSPITALIST

## 2019-03-29 PROCEDURE — 99217 OBSERVATION CARE DISCHARGE: CPT | Performed by: HOSPITALIST

## 2019-03-29 RX ORDER — ALFUZOSIN HYDROCHLORIDE 10 MG/1
10 TABLET, EXTENDED RELEASE ORAL ONCE
Status: COMPLETED | OUTPATIENT
Start: 2019-03-29 | End: 2019-03-29

## 2019-03-29 RX ORDER — POTASSIUM CHLORIDE 20 MEQ/1
40 TABLET, EXTENDED RELEASE ORAL EVERY 4 HOURS
Status: COMPLETED | OUTPATIENT
Start: 2019-03-29 | End: 2019-03-29

## 2019-03-29 RX ORDER — METOPROLOL SUCCINATE 25 MG/1
12.5 TABLET, EXTENDED RELEASE ORAL
Qty: 15 TABLET | Refills: 5 | Status: SHIPPED | OUTPATIENT
Start: 2019-03-30 | End: 2019-06-11

## 2019-03-29 RX ORDER — METOPROLOL SUCCINATE 25 MG/1
12.5 TABLET, EXTENDED RELEASE ORAL
Status: DISCONTINUED | OUTPATIENT
Start: 2019-03-30 | End: 2019-03-29

## 2019-03-29 NOTE — TELEPHONE ENCOUNTER
[facility] calling to advise order will be coming soon for signature, no callback is needed

## 2019-03-29 NOTE — CM/SW NOTE
Addend 413p:     JOSI met w/ pt and pt's 2 daughters. Provided family w/ resources for [place], retirement and caregiver resources. ----------------------------------------------------------------------------  JOSI received MDO for Op[facility] [address] [place] [address].  Per chart review, pt i

## 2019-03-29 NOTE — HOME CARE LIAISON
Met with patient and his daughters at the bedside. Patient is agreeable to [facility]. Brochure and liaison's card provided with contact information. All questions addressed and answered.

## 2019-03-29 NOTE — PLAN OF CARE
CARDIOVASCULAR - ADULT    • Maintains optimal cardiac output and hemodynamic stability Progressing    • Absence of cardiac arrhythmias or at baseline Progressing        GENITOURINARY - ADULT    • Absence of urinary retention Progressing        Impaired Fun

## 2019-03-29 NOTE — PROGRESS NOTES
[facility] Heart Cardiology Progress Note      Vickie Plaza Patient Status:  Observation    1926 MRN [id number]   Location [facility] Attending Kang Duncan MD   Hosp Day # 0 PCP Val Maciel MD 98 %.  Intake/Output:   Intake/Output       03/27 0700 - 03/28 0659 03/28 0700 - 03/29 0659 03/29 0700 - 03/30 0659    P.O.  100     I.V. (mL/kg)  1200 (16.5)     Total Intake(mL/kg)  1300 (17.9)     Urine (mL/kg/hr)  175     Stool  0     Total Output  175 12/10/2018    TROP <0.045 03/28/2019    CK 29 (L) 03/28/2019       Ct Brain Or Head ([address])    Result Date: 3/28/2019  CONCLUSION:   No acute intracranial abnormality by noncontrast CT.   Generalized atrophy with nonspecific white matter changes most likely

## 2019-03-29 NOTE — DISCHARGE SUMMARY
[facility] HOSPITALIST  DISCHARGE SUMMARY     Teressa Son Patient Status:  Observation    1926 MRN [id number]   Location [address] [facility] Attending No att. providers found   [facility] Day # 0 PCP Cheikh Bolden MD     DATE OF ADMISSION: 3/28/201 cardiology and his primary care physician. He will have home physical therapy. Patient understands return to the emergency room for increased pain, fever, discharge, shortness of breath, chest pain, new neurologic symptoms, other concerning symptoms. capsule  Refills:  3        CONTINUE taking these medications      Instructions Prescription details   allopurinol 300 MG Tabs  Commonly known as:  ZYLOPRIM      TAKE ONE TABLET BY MOUTH ONCE DAILY   Quantity:  90 tablet  Refills:  3     CENTRUM SILVER ULT > 58: High Risk of readmission after discharge from the hospital.

## 2019-03-29 NOTE — OCCUPATIONAL THERAPY NOTE
OCCUPATIONAL THERAPY EVALUATION - INPATIENT     Room Number: 502/502-A  Evaluation Date: 3/29/2019  Type of Evaluation: Initial  Presenting Problem: fall, dehydration, Afib    Physician Order: IP Consult to Occupational Therapy  Reason for Therapy: ADL/IAD benefit from skilled inpatient OT to address the above deficits, maximizing patient's ability to return to prior level of function.     The patient's Approx Degree of Impairment: 32.79% has been calculated based on documentation in the [facility] '6 clicks' Inpa LAMINECTOMY  2000       HOME SITUATION  Type of Home: House  Home Layout: One level  Lives With: Alone(daughter comes to check on him several times a week)        Shower/Tub and Equipment: Tub-shower combo; Tub transfer bench(pt has been sponge bathing ) Bathing (including washing, rinsing, drying)?: A Little  -   Toileting, which includes using toilet, bedpan or urinal? : A Little  -   Putting on and taking off regular upper body clothing?: None  -   Taking care of personal grooming such as brushing teeth

## 2019-03-29 NOTE — PHYSICAL THERAPY NOTE
PHYSICAL THERAPY EVALUATION - INPATIENT     Room Number: 481/015-Z  Evaluation Date: 3/29/2019  Type of Evaluation: Initial   Physician Order: PT Eval and Treat    Presenting Problem: Fall  Reason for Therapy: Mobility Dysfunction and Discharge Planning of impairment may benefit from home with home PT and 24/7 assist and supervision to optimize safety and functional outcomes. Patient will benefit from continued IP PT services to address these deficits in preparation for discharge.     DISCHARGE RECOMMEN (Rollator)  Patient Regularly Uses: Glasses    Prior Level of [place]: Ind in ADL's and IADL's- dtr providing meals PRN- pt still drives. Ambulates with rollator.      SUBJECTIVE  \"I still exercise every day, feel my muscle\"    PHYSICAL THERAPY EXAMI posture;)  Stoop/Curb Assistance: Not tested    Exercise/Education Provided:  Bed mobility  Body mechanics  Energy conservation  Functional activity tolerated  Gait training  Posture  Strengthening  Lower therapeutic exercise:   Ankle pumps  Transfer traini

## 2019-03-29 NOTE — PHYSICAL THERAPY NOTE
PT orders received- attempted evaluation- pt off floor for testing. Will re-attempt.   Thank you,  Adam Sethi, PT, DPT

## 2019-03-30 ENCOUNTER — TELEPHONE (OUTPATIENT)
Dept: OTOLARYNGOLOGY | Facility: CLINIC | Age: 84
End: 2019-03-30

## 2019-03-30 NOTE — TELEPHONE ENCOUNTER
Pts daughter states she needs to cancel pts sx scheduled for 4/3.  States pt fell and is in the hospital. They are not ready to reschedule now, but will call back once pt is doing better.   (aware office is closed until Monday)

## 2019-04-01 ENCOUNTER — TELEPHONE (OUTPATIENT)
Dept: INTERNAL MEDICINE CLINIC | Facility: CLINIC | Age: 84
End: 2019-04-01

## 2019-04-01 ENCOUNTER — TELEPHONE (OUTPATIENT)
Dept: CARDIOLOGY UNIT | Facility: HOSPITAL | Age: 84
End: 2019-04-01

## 2019-04-01 ENCOUNTER — ANCILLARY PROCEDURE (OUTPATIENT)
Dept: CARDIOLOGY | Age: 84
End: 2019-04-01
Attending: INTERNAL MEDICINE

## 2019-04-01 ENCOUNTER — PATIENT OUTREACH (OUTPATIENT)
Dept: CASE MANAGEMENT | Age: 84
End: 2019-04-01

## 2019-04-01 ENCOUNTER — ANCILLARY ORDERS (OUTPATIENT)
Dept: CARDIOLOGY | Age: 84
End: 2019-04-01

## 2019-04-01 DIAGNOSIS — I48.0 AF (PAROXYSMAL ATRIAL FIBRILLATION) (CMD): ICD-10-CM

## 2019-04-01 DIAGNOSIS — I48.91 ATRIAL FIBRILLATION AND FLUTTER (HCC): ICD-10-CM

## 2019-04-01 DIAGNOSIS — I48.92 ATRIAL FIBRILLATION AND FLUTTER (HCC): ICD-10-CM

## 2019-04-01 DIAGNOSIS — Z02.9 ENCOUNTERS FOR UNSPECIFIED ADMINISTRATIVE PURPOSE: ICD-10-CM

## 2019-04-01 PROCEDURE — 1111F DSCHRG MED/CURRENT MED MERGE: CPT

## 2019-04-01 NOTE — PROGRESS NOTES
Initial Post Discharge Follow Up   Discharge Date: 3/29/19  Contact Date: 4/1/2019    Consent Verification:  Assessment Completed With: Patient- Pt had me speak to Mamadou Buck who was with him currently and then recommended to call his dtr Ros.    HIPAA Verif

## 2019-04-01 NOTE — PROGRESS NOTES
Dtr called back, LM to call her back. Tried to call dtr Ros back as requested. S/w dtr however she stated the pt is with her at the cardiologist office. Dtr stated she will call back. Provided [facility] phone number to call back.  Will await a returned phone c

## 2019-04-01 NOTE — TELEPHONE ENCOUNTER
Pts daugh is RT Surgery Sched call. Pts daugh states that the pt. Had a fall, so she needs to cancel his proc. At this time, so she will call back to resched at a later time.

## 2019-04-01 NOTE — TELEPHONE ENCOUNTER
Misael Becerril, would like to inform the doctor that the patient was admitted to [address] [place] [address] yesterday. Misael Becerril would like order for speech therapy.

## 2019-04-01 NOTE — TELEPHONE ENCOUNTER
Called and spoke with patients daughter. States patient fell on 03/28 and went to ER. Per daughter testing in ER showed patient had Afib.  Patient will be following up with cardiologist first to get condition under control and once patient is doing better w

## 2019-04-03 ENCOUNTER — TELEPHONE (OUTPATIENT)
Dept: INTERNAL MEDICINE CLINIC | Facility: CLINIC | Age: 84
End: 2019-04-03

## 2019-04-03 ENCOUNTER — OFFICE VISIT (OUTPATIENT)
Dept: CARDIOLOGY CLINIC | Facility: HOSPITAL | Age: 84
End: 2019-04-03
Attending: NURSE PRACTITIONER
Payer: MEDICARE

## 2019-04-03 VITALS
HEART RATE: 68 BPM | WEIGHT: 167.88 LBS | SYSTOLIC BLOOD PRESSURE: 121 MMHG | BODY MASS INDEX: 24 KG/M2 | OXYGEN SATURATION: 99 % | DIASTOLIC BLOOD PRESSURE: 68 MMHG

## 2019-04-03 DIAGNOSIS — I48.92 ATRIAL FIBRILLATION AND FLUTTER (HCC): Primary | ICD-10-CM

## 2019-04-03 DIAGNOSIS — I48.91 ATRIAL FIBRILLATION AND FLUTTER (HCC): Primary | ICD-10-CM

## 2019-04-03 DIAGNOSIS — I48.91 ATRIAL FIBRILLATION (HCC): ICD-10-CM

## 2019-04-03 PROCEDURE — 93010 ELECTROCARDIOGRAM REPORT: CPT | Performed by: NURSE PRACTITIONER

## 2019-04-03 PROCEDURE — 99213 OFFICE O/P EST LOW 20 MIN: CPT | Performed by: NURSE PRACTITIONER

## 2019-04-03 PROCEDURE — 85025 COMPLETE CBC W/AUTO DIFF WBC: CPT | Performed by: NURSE PRACTITIONER

## 2019-04-03 PROCEDURE — 36415 COLL VENOUS BLD VENIPUNCTURE: CPT | Performed by: NURSE PRACTITIONER

## 2019-04-03 PROCEDURE — 93005 ELECTROCARDIOGRAM TRACING: CPT

## 2019-04-03 NOTE — PROGRESS NOTES
Initial Post Discharge Follow Up   Discharge Date: 3/29/19  Contact Date: 4/3/2019    Consent Verification:  Assessment Completed With: Other: DtrValentino Ona Permission received per patient?  written  HIPAA Verified?   Yes    Discharge Dx:     Near syncope you given a specific diet to follow at discharge?   yes  o Which diet? Low sodium diet. - Do you need more information on this diet? no  - Are you able to follow this diet? yes    Medications: Reviewed medication list with the patient's dtr.  Medication medication’s purpose explained? yes  o Was the new medication’s side effects discussed? yes  o Do you have any questions about your new medication?  No  • Did you  your discharge medications when you left the hospital? Yes  • May I go over your medic you received while in the hospital?  excellent        Interventions by NCM: All d/c instructions reviewed with the pt's dr. Muñoz Memory when to call MD vs when to call 911 or go the ED. Discussed s/s of a fib and fall precautions.  Dtr verbalized understanding

## 2019-04-03 NOTE — PATIENT INSTRUCTIONS
Continue current medications    Return to [address] [place] [address] as needed or directed    Follow up with Dr. Gunjan Hargrove as directed     Follow up with Dr. Coleman Gomez     Please call the heart failure clinic if you notice a weight gain of 3 pounds overnight or 5

## 2019-04-03 NOTE — TELEPHONE ENCOUNTER
Pt was contacted for TCM, s/w dtr Ros per HIPPA. Pt currently does not have his HFU appt scheduled. Attempted to schedule an appt for the pt however unable to due to schedule limitations.  TCM/HFU is recommended by 4/5/19 as pt is a high risk for readmiss

## 2019-04-03 NOTE — PROGRESS NOTES
Chandur Ryan Blancas called back and LM to call her back. S/w Ryan Blancas, she stated she and the pt are at an appt therefore a call back later this morning would be good. NCM to FU.

## 2019-04-03 NOTE — PROGRESS NOTES
[address] [place] [address]  Patient Status:  Outpatient    1926 MRN [id number]   Location MD Dr. John Granger is a 80year old male who presents to clinic for rec 03/29/2019 06:21 AM    ALB 2.5 (L) 03/29/2019 06:21 AM    ALKPHO 121 (H) 03/29/2019 06:21 AM    BILT 1.3 03/29/2019 06:21 AM    TP 6.2 (L) 03/29/2019 06:21 AM    AST 29 03/29/2019 06:21 AM    ALT 26 03/29/2019 06:21 AM    INR 1.1 06/02/2014 10:22 AM    TSH medications    Return to [address] [place] [address] as needed or directed    Follow up with Dr. Naveed Esparza as directed     Follow up with Dr. Sophie Becker     Please call the heart failure clinic if you notice a weight gain of 3 pounds overnight or 5 pounds or more in

## 2019-04-05 ENCOUNTER — TELEPHONE (OUTPATIENT)
Dept: INTERNAL MEDICINE CLINIC | Facility: CLINIC | Age: 84
End: 2019-04-05

## 2019-04-05 NOTE — TELEPHONE ENCOUNTER
Denise Brennan from [facility] OT eval will be next week.

## 2019-04-10 ENCOUNTER — OFFICE VISIT (OUTPATIENT)
Dept: INTERNAL MEDICINE CLINIC | Facility: CLINIC | Age: 84
End: 2019-04-10
Payer: MEDICARE

## 2019-04-10 VITALS
RESPIRATION RATE: 16 BRPM | BODY MASS INDEX: 24 KG/M2 | DIASTOLIC BLOOD PRESSURE: 81 MMHG | SYSTOLIC BLOOD PRESSURE: 162 MMHG | HEART RATE: 76 BPM | WEIGHT: 170 LBS

## 2019-04-10 DIAGNOSIS — R53.1 WEAKNESS: ICD-10-CM

## 2019-04-10 DIAGNOSIS — I48.91 ATRIAL FIBRILLATION AND FLUTTER (HCC): Primary | ICD-10-CM

## 2019-04-10 DIAGNOSIS — I48.92 ATRIAL FIBRILLATION AND FLUTTER (HCC): Primary | ICD-10-CM

## 2019-04-10 PROCEDURE — 99495 TRANSJ CARE MGMT MOD F2F 14D: CPT | Performed by: INTERNAL MEDICINE

## 2019-04-10 PROCEDURE — 1111F DSCHRG MED/CURRENT MED MERGE: CPT | Performed by: INTERNAL MEDICINE

## 2019-04-10 NOTE — PROGRESS NOTES
HPI:    Santhosh Shipley is a 80year old male here today for hospital follow up.    He was discharged from Inpatient hospital, [facility] AND [facility]  to Home   Admission Date: 3/28/19   Discharge Date: 3/29/19  Hospital Discharge Diagnoses (since 3/11/2019)   N Beta Blocker. apixaban 5 MG Oral Tab Take 1 tablet (5 mg total) by mouth 2 (two) times daily. PEG 3350 Oral Powd Pack Take 17 g by mouth daily as needed. FIBER ADULT GUMMIES OR Take 3-4 tablets by mouth daily.      Multiple Vitamins-Minerals (CENTRUM smokeless tobacco. He reports that he does not drink alcohol or use drugs.      ROS:   GENERAL: weight stable, energy stable, no sweating  SKIN: denies any unusual skin lesions  EYES: denies blurred vision or double vision  HEENT: denies nasal congestion, s Cautioned about excessive bleeding with trauma or lacerations. 2. Weakness    Has some weakness and fell in kitchen and spent a night in the hospital where testing was done. No orders of the defined types were placed in this encounter.       Meds & R

## 2019-04-11 RX ORDER — TAMSULOSIN HYDROCHLORIDE 0.4 MG/1
0.4 CAPSULE ORAL
COMMUNITY

## 2019-04-11 RX ORDER — METOPROLOL SUCCINATE 25 MG/1
25 TABLET, EXTENDED RELEASE ORAL
COMMUNITY
Start: 2018-11-16 | End: 2019-05-15 | Stop reason: ALTCHOICE

## 2019-04-11 RX ORDER — NIFEDIPINE 30 MG/1
TABLET, EXTENDED RELEASE ORAL
COMMUNITY
Start: 2018-11-16 | End: 2019-04-17

## 2019-04-11 RX ORDER — LOVASTATIN 40 MG/1
TABLET ORAL
COMMUNITY
Start: 2018-11-16 | End: 2019-09-18 | Stop reason: SDUPTHER

## 2019-04-11 RX ORDER — ALLOPURINOL 100 MG/1
300 TABLET ORAL
COMMUNITY
Start: 2014-08-22 | End: 2020-01-10 | Stop reason: SDUPTHER

## 2019-04-11 RX ORDER — OMEGA-3 FATTY ACIDS/FISH OIL 300-1000MG
CAPSULE ORAL
COMMUNITY
Start: 2014-08-22 | End: 2019-04-17

## 2019-04-15 ENCOUNTER — TELEPHONE (OUTPATIENT)
Dept: CARDIOLOGY | Age: 84
End: 2019-04-15

## 2019-04-15 ENCOUNTER — TELEPHONE (OUTPATIENT)
Dept: CARDIOLOGY CLINIC | Facility: HOSPITAL | Age: 84
End: 2019-04-15

## 2019-04-15 NOTE — TELEPHONE ENCOUNTER
spolk with daughter Jony Sears. Patient having assistant fatigue, lightheadedness and dizziness for last 4 days and shortness of breath walking today with PT around the house. Denies chest pain, near syncope or falls.   Pulse rates have been in the 60s

## 2019-04-16 PROBLEM — E78.00 HYPERCHOLESTEROLEMIA: Status: ACTIVE | Noted: 2019-04-16

## 2019-04-16 PROBLEM — I35.1 AORTIC INSUFFICIENCY: Status: ACTIVE | Noted: 2019-04-16

## 2019-04-16 PROBLEM — R09.89 CAROTID BRUIT: Status: ACTIVE | Noted: 2019-04-16

## 2019-04-16 PROBLEM — I48.92 ATRIAL FIBRILLATION AND FLUTTER (CMD): Status: ACTIVE | Noted: 2019-03-28

## 2019-04-16 PROBLEM — I48.91 ATRIAL FIBRILLATION AND FLUTTER (CMD): Status: ACTIVE | Noted: 2019-03-28

## 2019-04-16 PROBLEM — I10 HYPERTENSION: Status: ACTIVE | Noted: 2019-04-16

## 2019-04-16 PROBLEM — I25.10 CAD (CORONARY ARTERY DISEASE): Status: ACTIVE | Noted: 2019-04-16

## 2019-04-16 RX ORDER — FLUTICASONE PROPIONATE 50 MCG
50 SPRAY, SUSPENSION (ML) NASAL DAILY
COMMUNITY
Start: 2017-07-20 | End: 2019-04-17

## 2019-04-16 RX ORDER — DOCUSATE SODIUM 100 MG/1
100 CAPSULE, LIQUID FILLED ORAL PRN
COMMUNITY
Start: 2013-04-16

## 2019-04-16 RX ORDER — POLYETHYLENE GLYCOL 3350 17 G/17G
17 POWDER, FOR SOLUTION ORAL PRN
COMMUNITY
End: 2019-04-17 | Stop reason: CLARIF

## 2019-04-16 RX ORDER — OMEPRAZOLE 20 MG/1
20 CAPSULE, DELAYED RELEASE ORAL DAILY
COMMUNITY
Start: 2018-08-20

## 2019-04-17 ENCOUNTER — LAB ENCOUNTER (OUTPATIENT)
Dept: LAB | Facility: HOSPITAL | Age: 84
End: 2019-04-17
Attending: NURSE PRACTITIONER
Payer: MEDICARE

## 2019-04-17 ENCOUNTER — OFFICE VISIT (OUTPATIENT)
Dept: CARDIOLOGY | Age: 84
End: 2019-04-17

## 2019-04-17 VITALS
BODY MASS INDEX: 22.96 KG/M2 | HEART RATE: 72 BPM | WEIGHT: 164 LBS | OXYGEN SATURATION: 98 % | HEIGHT: 71 IN | DIASTOLIC BLOOD PRESSURE: 62 MMHG | SYSTOLIC BLOOD PRESSURE: 140 MMHG

## 2019-04-17 DIAGNOSIS — I10 ESSENTIAL HYPERTENSION: ICD-10-CM

## 2019-04-17 DIAGNOSIS — I48.19 PERSISTENT ATRIAL FIBRILLATION (HCC): Primary | ICD-10-CM

## 2019-04-17 DIAGNOSIS — I35.0 AORTIC VALVE STENOSIS, ETIOLOGY OF CARDIAC VALVE DISEASE UNSPECIFIED: Primary | ICD-10-CM

## 2019-04-17 DIAGNOSIS — I48.19 PERSISTENT ATRIAL FIBRILLATION (CMD): ICD-10-CM

## 2019-04-17 LAB
ABSOLUTE IMMATURE GRANULOCYTES (OFFPRE24): NORMAL
BASO+EOS+MONOS # BLD: NORMAL 10*3/UL
BASO+EOS+MONOS NFR BLD: NORMAL %
BASOPHILS # BLD: NORMAL 10*3/UL
BASOPHILS NFR BLD: NORMAL %
DIFFERENTIAL METHOD BLD: NORMAL
EOSINOPHIL # BLD: NORMAL 10*3/UL
EOSINOPHIL NFR BLD: NORMAL %
ERYTHROCYTE [DISTWIDTH] IN BLOOD: NORMAL %
HCT VFR BLD CALC: 41.1 %
HGB BLD-MCNC: 13.1 G/DL
IMMATURE GRANULOCYTES (OFFPRE25): NORMAL
LYMPHOCYTES # BLD: NORMAL 10*3/UL
LYMPHOCYTES NFR BLD: NORMAL %
MCH RBC QN AUTO: NORMAL PG
MCHC RBC AUTO-ENTMCNC: NORMAL G/DL
MCV RBC AUTO: NORMAL FL
MONOCYTES # BLD: NORMAL 10*3/UL
MONOCYTES NFR BLD: NORMAL %
MPV (OFFPRE2): NORMAL
NEUTROPHILS # BLD: NORMAL 10*3/UL
NEUTROPHILS NFR BLD: NORMAL %
NRBC BLD MANUAL-RTO: NORMAL %
PLAT MORPH BLD: NORMAL
PLATELET # BLD: 303 10*3/UL
RBC # BLD: 4.16 10*6/UL
RBC MORPH BLD: NORMAL
WBC # BLD: 10.6 10*3/UL
WBC MORPH BLD: NORMAL

## 2019-04-17 PROCEDURE — 99214 OFFICE O/P EST MOD 30 MIN: CPT | Performed by: NURSE PRACTITIONER

## 2019-04-17 PROCEDURE — 85025 COMPLETE CBC W/AUTO DIFF WBC: CPT

## 2019-04-17 PROCEDURE — 36415 COLL VENOUS BLD VENIPUNCTURE: CPT

## 2019-04-18 ENCOUNTER — TELEPHONE (OUTPATIENT)
Dept: CARDIOLOGY | Age: 84
End: 2019-04-18

## 2019-04-18 ENCOUNTER — CLINICAL ABSTRACT (OUTPATIENT)
Dept: CARDIOLOGY | Age: 84
End: 2019-04-18

## 2019-04-22 ENCOUNTER — TELEPHONE (OUTPATIENT)
Dept: CARDIOLOGY | Age: 84
End: 2019-04-22

## 2019-04-23 ENCOUNTER — OFFICE VISIT (OUTPATIENT)
Dept: PODIATRY CLINIC | Facility: CLINIC | Age: 84
End: 2019-04-23
Payer: MEDICARE

## 2019-04-23 DIAGNOSIS — M79.675 PAIN IN TOES OF BOTH FEET: Primary | ICD-10-CM

## 2019-04-23 DIAGNOSIS — B35.1 ONYCHOMYCOSIS: ICD-10-CM

## 2019-04-23 DIAGNOSIS — M79.674 PAIN IN TOES OF BOTH FEET: Primary | ICD-10-CM

## 2019-04-23 PROCEDURE — 11721 DEBRIDE NAIL 6 OR MORE: CPT | Performed by: PODIATRIST

## 2019-04-23 PROCEDURE — 99202 OFFICE O/P NEW SF 15 MIN: CPT | Performed by: PODIATRIST

## 2019-04-23 NOTE — PROGRESS NOTES
HPI:    Patient ID: Daija Maurer is a 80year old male. This 80-year-old male presents to me today as a new patient and is self-referred. He is accompanied today by his daughter. The chief complaint is painful toenails.   She is aware that he has not ha growth is noted but diminished on his toes. His nails have thickness, subungual debris and separation associated with chronic long-standing mycosis. I discussed and reviewed the etiology, progression, and for treatment options for fungus toenails.   Eitan

## 2019-04-24 ENCOUNTER — TELEPHONE (OUTPATIENT)
Dept: CARDIOLOGY | Age: 84
End: 2019-04-24

## 2019-04-29 RX ORDER — AMLODIPINE BESYLATE 2.5 MG/1
2.5 TABLET ORAL DAILY PRN
Qty: 90 TABLET | Refills: 3 | Status: SHIPPED | OUTPATIENT
Start: 2019-04-29 | End: 2019-08-16 | Stop reason: HOSPADM

## 2019-05-10 PROCEDURE — 93228 REMOTE 30 DAY ECG REV/REPORT: CPT | Performed by: INTERNAL MEDICINE

## 2019-05-14 ENCOUNTER — E-ADVICE (OUTPATIENT)
Dept: CARDIOLOGY | Age: 84
End: 2019-05-14

## 2019-05-14 ENCOUNTER — OFFICE VISIT (OUTPATIENT)
Dept: CARDIOLOGY | Age: 84
End: 2019-05-14

## 2019-05-14 VITALS
SYSTOLIC BLOOD PRESSURE: 132 MMHG | HEIGHT: 71 IN | DIASTOLIC BLOOD PRESSURE: 54 MMHG | OXYGEN SATURATION: 98 % | WEIGHT: 162 LBS | HEART RATE: 73 BPM | BODY MASS INDEX: 22.68 KG/M2

## 2019-05-14 DIAGNOSIS — I35.0 NONRHEUMATIC AORTIC VALVE STENOSIS: ICD-10-CM

## 2019-05-14 DIAGNOSIS — I48.19 PERSISTENT ATRIAL FIBRILLATION (CMD): Primary | ICD-10-CM

## 2019-05-14 PROCEDURE — 99214 OFFICE O/P EST MOD 30 MIN: CPT | Performed by: INTERNAL MEDICINE

## 2019-05-20 ENCOUNTER — ANCILLARY PROCEDURE (OUTPATIENT)
Dept: CARDIOLOGY | Age: 84
End: 2019-05-20
Attending: INTERNAL MEDICINE

## 2019-05-20 ENCOUNTER — OFFICE VISIT (OUTPATIENT)
Dept: INTERNAL MEDICINE CLINIC | Facility: CLINIC | Age: 84
End: 2019-05-20
Payer: MEDICARE

## 2019-05-20 VITALS
HEIGHT: 71 IN | DIASTOLIC BLOOD PRESSURE: 72 MMHG | WEIGHT: 164 LBS | BODY MASS INDEX: 22.96 KG/M2 | HEART RATE: 76 BPM | RESPIRATION RATE: 16 BRPM | SYSTOLIC BLOOD PRESSURE: 121 MMHG

## 2019-05-20 DIAGNOSIS — M54.42 CHRONIC LEFT-SIDED LOW BACK PAIN WITH LEFT-SIDED SCIATICA: ICD-10-CM

## 2019-05-20 DIAGNOSIS — I48.19 PERSISTENT ATRIAL FIBRILLATION (CMD): ICD-10-CM

## 2019-05-20 DIAGNOSIS — E78.2 MIXED HYPERLIPIDEMIA: ICD-10-CM

## 2019-05-20 DIAGNOSIS — R63.4 RECENT WEIGHT LOSS: ICD-10-CM

## 2019-05-20 DIAGNOSIS — G89.29 CHRONIC LEFT-SIDED LOW BACK PAIN WITH LEFT-SIDED SCIATICA: ICD-10-CM

## 2019-05-20 DIAGNOSIS — I10 ESSENTIAL HYPERTENSION WITH GOAL BLOOD PRESSURE LESS THAN 140/90: Primary | ICD-10-CM

## 2019-05-20 PROCEDURE — G0463 HOSPITAL OUTPT CLINIC VISIT: HCPCS | Performed by: INTERNAL MEDICINE

## 2019-05-20 PROCEDURE — 99214 OFFICE O/P EST MOD 30 MIN: CPT | Performed by: INTERNAL MEDICINE

## 2019-05-20 RX ORDER — AMLODIPINE BESYLATE 2.5 MG/1
2.5 TABLET ORAL
COMMUNITY

## 2019-05-20 RX ORDER — LIDOCAINE 50 MG/G
1 PATCH TOPICAL EVERY 24 HOURS
Qty: 30 PATCH | Refills: 1 | Status: ON HOLD | OUTPATIENT
Start: 2019-05-20 | End: 2020-11-02

## 2019-05-20 NOTE — PROGRESS NOTES
Margy Mckeon is a 80year old male. HPI:   1. Essential hypertension with goal blood pressure less than 140/90    Patient presents for recheck of his hypertension.  Pt has been taking medications as instructed, no medication side effects, home BP monit 12/23/2011 172     HDL Cholesterol (mg/dL)   Date Value   08/24/2018 48   08/28/2017 55   04/13/2017 57     HDL CHOLESTEROL (P) (mg/dL)   Date Value   04/10/2015 52   03/12/2012 74 (H)   12/23/2011 77 (H)     LDL Cholesterol (mg/dL)   Date Value   08/24/ right ear pinna   • Bilateral inguinal hernia     observation   • Cancer of kidney ([address] 2005   • Cholecystitis 2013    NPO, IV antibiotics   • Cirrhosis ([facility] 2013    per NG: cryptic cirrhosis; observation   • Diverticulosis     diet   • High blood pressu masses, HSM or tenderness  EXTREMITIES: no cyanosis, clubbing or edema  NEURO: can't remember any of 3 at 5 minutes. Can spell world backwards. Can do serial 7s x 2. ASSESSMENT AND PLAN:     1.  Essential hypertension with goal blood pressure less than

## 2019-05-23 ENCOUNTER — TELEPHONE (OUTPATIENT)
Dept: INTERNAL MEDICINE CLINIC | Facility: CLINIC | Age: 84
End: 2019-05-23

## 2019-05-23 ENCOUNTER — TELEPHONE (OUTPATIENT)
Dept: OTOLARYNGOLOGY | Facility: CLINIC | Age: 84
End: 2019-05-23

## 2019-05-23 NOTE — TELEPHONE ENCOUNTER
Called to follow up on patient regarding scheduling surgery.  Spoke with Daughter Cat Shelton (MICKEY on file) daughter states is still not ready to schedule surgery as patient has cardiac issues that they would like to address first. States once they are ready to s

## 2019-05-23 NOTE — TELEPHONE ENCOUNTER
Prior authorization for lidocaine 5 % External Patch completed w/ [employer] Medicare Part D on cover my meds Key:[id number], turn around time 24-72 hrs.

## 2019-05-24 ENCOUNTER — E-ADVICE (OUTPATIENT)
Dept: CARDIOLOGY | Age: 84
End: 2019-05-24

## 2019-05-24 PROCEDURE — 93227 XTRNL ECG REC<48 HR R&I: CPT | Performed by: INTERNAL MEDICINE

## 2019-05-24 NOTE — TELEPHONE ENCOUNTER
PA denied. The condition submitted for use of this medication is not supported, not \"medically accepted indication\".

## 2019-05-28 ENCOUNTER — TELEPHONE (OUTPATIENT)
Dept: CARDIOLOGY | Age: 84
End: 2019-05-28

## 2019-05-28 ENCOUNTER — TELEPHONE (OUTPATIENT)
Dept: INTERNAL MEDICINE CLINIC | Facility: CLINIC | Age: 84
End: 2019-05-28

## 2019-05-28 ENCOUNTER — HOSPITAL ENCOUNTER (EMERGENCY)
Facility: HOSPITAL | Age: 84
Discharge: HOME | End: 2019-05-28
Attending: EMERGENCY MEDICINE
Payer: MEDICARE

## 2019-05-28 ENCOUNTER — APPOINTMENT (OUTPATIENT)
Dept: GENERAL RADIOLOGY | Facility: HOSPITAL | Age: 84
End: 2019-05-28
Attending: EMERGENCY MEDICINE
Payer: MEDICARE

## 2019-05-28 ENCOUNTER — APPOINTMENT (OUTPATIENT)
Dept: ULTRASOUND IMAGING | Facility: HOSPITAL | Age: 84
End: 2019-05-28
Attending: EMERGENCY MEDICINE
Payer: MEDICARE

## 2019-05-28 VITALS
TEMPERATURE: 98 F | RESPIRATION RATE: 15 BRPM | OXYGEN SATURATION: 99 % | SYSTOLIC BLOOD PRESSURE: 147 MMHG | DIASTOLIC BLOOD PRESSURE: 72 MMHG | BODY MASS INDEX: 23 KG/M2 | WEIGHT: 164 LBS | HEART RATE: 80 BPM

## 2019-05-28 DIAGNOSIS — M25.00 HEMARTHROSIS: Primary | ICD-10-CM

## 2019-05-28 PROCEDURE — 99284 EMERGENCY DEPT VISIT MOD MDM: CPT

## 2019-05-28 PROCEDURE — 20610 DRAIN/INJ JOINT/BURSA W/O US: CPT

## 2019-05-28 PROCEDURE — 89060 EXAM SYNOVIAL FLUID CRYSTALS: CPT | Performed by: EMERGENCY MEDICINE

## 2019-05-28 PROCEDURE — 87205 SMEAR GRAM STAIN: CPT | Performed by: EMERGENCY MEDICINE

## 2019-05-28 PROCEDURE — 87070 CULTURE OTHR SPECIMN AEROBIC: CPT | Performed by: EMERGENCY MEDICINE

## 2019-05-28 PROCEDURE — 93971 EXTREMITY STUDY: CPT | Performed by: EMERGENCY MEDICINE

## 2019-05-28 PROCEDURE — 89050 BODY FLUID CELL COUNT: CPT | Performed by: EMERGENCY MEDICINE

## 2019-05-28 PROCEDURE — 89051 BODY FLUID CELL COUNT: CPT | Performed by: EMERGENCY MEDICINE

## 2019-05-28 PROCEDURE — 73560 X-RAY EXAM OF KNEE 1 OR 2: CPT | Performed by: EMERGENCY MEDICINE

## 2019-05-28 RX ORDER — LIDOCAINE HYDROCHLORIDE AND EPINEPHRINE 20; 5 MG/ML; UG/ML
20 INJECTION, SOLUTION EPIDURAL; INFILTRATION; INTRACAUDAL; PERINEURAL ONCE
Status: COMPLETED | OUTPATIENT
Start: 2019-05-28 | End: 2019-05-28

## 2019-05-28 NOTE — ED PROVIDER NOTES
Patient Seen in: [facility] AND [facility] Emergency Department    History   Patient presents with:  Swelling Edema (cardiovascular, metabolic)    Stated Complaint: left knee pain; swollen and warm    HPI    80year-old male presents for complaint of atraumatic No      Review of Systems   Constitutional: Negative. HENT: Negative. Eyes: Negative. Respiratory: Negative. Cardiovascular: Negative. Gastrointestinal: Negative. Genitourinary: Negative. Musculoskeletal: Negative. Skin: Negative. Notable for the following components:       Result Value    WBC SYNOVIAL 25,435 (*)     RBC Synovial 115,005 (*)     All other components within normal limits   CELL CT/DIF & CRYSTAL SYNOVIAL    Narrative:      The following orders were created for panel or Follow-up encouraged. Imaging:   Xr Knee (1 Or 2 Views), Left (cpt=73560)    Result Date: 5/28/2019  PROCEDURE: XR KNEE (1 OR 2 VIEWS), LEFT (CPT=73560)  COMPARISON: None. INDICATIONS: Left knee pain. swollen and warm to the touch x 2 days.  no known in CONCLUSION:  No deep venous thrombosis.   Normal venous duplex exam. Complex fluid in the prepatellar suprasellar superficial soft tissue suggesting a bursitis and/or subcutaneous hematoma/seroma    Dictated by (CST): Alina Dubois MD on 5/28/2019

## 2019-05-28 NOTE — ED NOTES
Pt to ED c/o left knee pain/swelling x 2 days. Pt reports increased exercise with caregiver at home last week, followed by a period of inactivity. Denies fever/chills. Mild swelling noted to anterior left knee. Pt denies other complaints.

## 2019-05-28 NOTE — ED INITIAL ASSESSMENT (HPI)
Patient here with c/o atraumatic left knee pain with redness and swelling x 2 days. Sent for r/o dvt. Recently started on eliquis for a fib.

## 2019-05-29 ENCOUNTER — TELEPHONE (OUTPATIENT)
Dept: INTERNAL MEDICINE CLINIC | Facility: CLINIC | Age: 84
End: 2019-05-29

## 2019-05-29 NOTE — TELEPHONE ENCOUNTER
Jonathan from [facility] was suppose to discharge pt today, however she later found out pt was in ER yesterday morning. Pt had bleeding in left knee. Jonathan would like to re certify pt for [facility] 1 time a week.   Please advise

## 2019-05-30 ENCOUNTER — DOCUMENTATION (OUTPATIENT)
Dept: CARDIOLOGY | Age: 84
End: 2019-05-30

## 2019-05-30 RX ORDER — TAMSULOSIN HYDROCHLORIDE 0.4 MG/1
0.4 CAPSULE ORAL DAILY
Qty: 90 CAPSULE | Refills: 1 | Status: SHIPPED | OUTPATIENT
Start: 2019-05-30 | End: 2019-12-30

## 2019-05-30 NOTE — TELEPHONE ENCOUNTER
LR 5-15-18 # 90 +  3    Review pended refill request as it does not fall under a protocol.   Requested Prescriptions     Pending Prescriptions Disp Refills   • tamsulosin HCl 0.4 MG Oral Cap 90 capsule 3         Recent Visits  Date Type Provider Dept   05/2

## 2019-06-04 PROBLEM — I48.19 PERSISTENT ATRIAL FIBRILLATION (HCC): Status: ACTIVE | Noted: 2019-03-28

## 2019-06-04 PROBLEM — R09.89 CAROTID BRUIT: Status: ACTIVE | Noted: 2019-04-16

## 2019-06-04 PROBLEM — M11.20 CHONDROCALCINOSIS ARTICULARIS: Status: ACTIVE | Noted: 2019-06-04

## 2019-06-04 PROBLEM — M25.062 HEMARTHROSIS OF LEFT KNEE: Status: ACTIVE | Noted: 2019-06-04

## 2019-06-04 PROBLEM — M17.12 PRIMARY OSTEOARTHRITIS OF LEFT KNEE: Status: ACTIVE | Noted: 2019-06-04

## 2019-06-04 PROBLEM — I35.0 AORTIC STENOSIS: Status: ACTIVE | Noted: 2019-06-04

## 2019-06-04 PROBLEM — I25.10 ATHEROSCLEROSIS OF CORONARY ARTERY: Status: ACTIVE | Noted: 2019-04-16

## 2019-06-05 ENCOUNTER — E-ADVICE (OUTPATIENT)
Dept: CARDIOLOGY | Age: 84
End: 2019-06-05

## 2019-06-07 ENCOUNTER — E-ADVICE (OUTPATIENT)
Dept: CARDIOLOGY | Age: 84
End: 2019-06-07

## 2019-06-11 ENCOUNTER — OFFICE VISIT (OUTPATIENT)
Dept: INTERNAL MEDICINE CLINIC | Facility: CLINIC | Age: 84
End: 2019-06-11
Payer: MEDICARE

## 2019-06-11 VITALS
BODY MASS INDEX: 22.68 KG/M2 | RESPIRATION RATE: 16 BRPM | DIASTOLIC BLOOD PRESSURE: 77 MMHG | HEART RATE: 69 BPM | HEIGHT: 71 IN | WEIGHT: 162 LBS | SYSTOLIC BLOOD PRESSURE: 155 MMHG

## 2019-06-11 DIAGNOSIS — R63.4 RECENT WEIGHT LOSS: ICD-10-CM

## 2019-06-11 DIAGNOSIS — G89.29 CHRONIC LEFT-SIDED LOW BACK PAIN WITH LEFT-SIDED SCIATICA: ICD-10-CM

## 2019-06-11 DIAGNOSIS — M25.562 ACUTE PAIN OF LEFT KNEE: Primary | ICD-10-CM

## 2019-06-11 DIAGNOSIS — M54.42 CHRONIC LEFT-SIDED LOW BACK PAIN WITH LEFT-SIDED SCIATICA: ICD-10-CM

## 2019-06-11 DIAGNOSIS — E78.2 MIXED HYPERLIPIDEMIA: ICD-10-CM

## 2019-06-11 DIAGNOSIS — I10 ESSENTIAL HYPERTENSION WITH GOAL BLOOD PRESSURE LESS THAN 140/90: ICD-10-CM

## 2019-06-11 PROCEDURE — 99214 OFFICE O/P EST MOD 30 MIN: CPT | Performed by: INTERNAL MEDICINE

## 2019-06-11 PROCEDURE — G0463 HOSPITAL OUTPT CLINIC VISIT: HCPCS | Performed by: INTERNAL MEDICINE

## 2019-06-11 NOTE — PROGRESS NOTES
Aylin Newman is a 80year old male. HPI:   1. Acute pain of left knee    Had swelling and pain of left knee and saw ER along with dr WALSH ONCOLOGICO DR MARYBETH RODRIGUEZ. Knee is feeling better currently. Has been walking a little bit less.      2. Essential hypertension with Ermalinda Sic says recent memory issues seen more in poatient. Still drives the car. Weight down mildly as is appetite.      Cholesterol, Total (mg/dL)   Date Value   08/24/2018 135   08/28/2017 157   04/13/2017 165   04/10/2015 145   03/12/2012 178   12/23/2011 172 Disp: 30 patch Rfl: 1      Past Medical History:   Diagnosis Date   • Atrial fibrillation ([facility]    • BCC (basal cell carcinoma) 2019    right ear pinna   • Bilateral inguinal hernia     observation   • Cancer of kidney ([facility] 2005   • Cholecystitis 2013 suspicious lesions  HEENT: atraumatic, normocephalic, throat is clear  EARS: Right cerumen impaction  NECK: supple,no adenopathy,no bruits  LUNGS: clear to auscultation  CARDIO: RRR without murmur  GI: good BS's,no masses, HSM or tenderness  EXTREMITIES: n to observe pain patterns and see if they increase somewhat. Saw Luis Reid in consultation. 5. Recent weight loss    Has lost 6 pounds in last month. Will check some blood work.  Had more left sided  back pain that layed him up and may have affected appe

## 2019-06-17 ENCOUNTER — TELEPHONE (OUTPATIENT)
Dept: OTHER | Age: 84
End: 2019-06-17

## 2019-06-17 ENCOUNTER — PATIENT MESSAGE (OUTPATIENT)
Dept: INTERNAL MEDICINE CLINIC | Facility: CLINIC | Age: 84
End: 2019-06-17

## 2019-06-17 DIAGNOSIS — H90.5 SENSORINEURAL HEARING LOSS (SNHL), UNSPECIFIED LATERALITY: Primary | ICD-10-CM

## 2019-06-17 NOTE — TELEPHONE ENCOUNTER
Dr Rocio Alanis-referral pended for approval.see dx code  Before signing.     ----- Message from [employer] [facility] Raven Larios Degree sent at 6/17/2019 12:57 PM CDT -----  Regarding: Referral Request  Contact: [phone]  Can you recommend a hearing specialist who can help my

## 2019-06-17 NOTE — TELEPHONE ENCOUNTER
See TE Referral 6/17/19, will close this encounter. From: Mary Devine  To: Maryann Carreno MD  Sent: 6/17/2019 12:57 PM CDT  Subject: Referral Request    Can you recommend a hearing specialist who can help my dad with hearing aids.   I see a not

## 2019-06-18 ENCOUNTER — TELEPHONE (OUTPATIENT)
Dept: INTERNAL MEDICINE CLINIC | Facility: CLINIC | Age: 84
End: 2019-06-18

## 2019-06-18 NOTE — TELEPHONE ENCOUNTER
Jonathan from [facility] states she  plan to discharge the pt next week please call back to let her know if that is ok please advice

## 2019-07-08 RX ORDER — OMEPRAZOLE 20 MG/1
CAPSULE, DELAYED RELEASE ORAL
Qty: 90 CAPSULE | Refills: 1 | Status: SHIPPED | OUTPATIENT
Start: 2019-07-08 | End: 2020-03-05

## 2019-07-22 ENCOUNTER — OFFICE VISIT (OUTPATIENT)
Dept: INTERNAL MEDICINE CLINIC | Facility: CLINIC | Age: 84
End: 2019-07-22
Payer: MEDICARE

## 2019-07-22 VITALS
SYSTOLIC BLOOD PRESSURE: 138 MMHG | HEART RATE: 64 BPM | BODY MASS INDEX: 23 KG/M2 | WEIGHT: 163 LBS | RESPIRATION RATE: 16 BRPM | DIASTOLIC BLOOD PRESSURE: 70 MMHG

## 2019-07-22 DIAGNOSIS — M25.562 ACUTE PAIN OF LEFT KNEE: Primary | ICD-10-CM

## 2019-07-22 DIAGNOSIS — M54.42 CHRONIC LEFT-SIDED LOW BACK PAIN WITH LEFT-SIDED SCIATICA: ICD-10-CM

## 2019-07-22 DIAGNOSIS — E78.2 MIXED HYPERLIPIDEMIA: ICD-10-CM

## 2019-07-22 DIAGNOSIS — G89.29 CHRONIC LEFT-SIDED LOW BACK PAIN WITH LEFT-SIDED SCIATICA: ICD-10-CM

## 2019-07-22 DIAGNOSIS — I10 ESSENTIAL HYPERTENSION WITH GOAL BLOOD PRESSURE LESS THAN 140/90: ICD-10-CM

## 2019-07-22 PROCEDURE — G0463 HOSPITAL OUTPT CLINIC VISIT: HCPCS | Performed by: INTERNAL MEDICINE

## 2019-07-22 PROCEDURE — 99214 OFFICE O/P EST MOD 30 MIN: CPT | Performed by: INTERNAL MEDICINE

## 2019-07-22 NOTE — PROGRESS NOTES
Sarah Garrido is a 80year old male. HPI:   1. Acute pain of left knee    Had swelling and pain of left knee and saw ER along with dr Margarito Hazel. Knee is feeling better currently. Has been walking a little bit better recently.  He had bled into the knee f memory loss issues. Hearing is decreased. Lives alone. Daughter says recent memory issues seen more in patient. Still drives the car. Weight currently is rather stable.      Cholesterol, Total (mg/dL)   Date Value   08/24/2018 135   08/28/2017 157   04/13/2 Rfl:    PEG 3350 Oral Powd Pack Take 17 g by mouth daily as needed.  Disp:  Rfl:       Past Medical History:   Diagnosis Date   • Atrial fibrillation ([facility]    • BCC (basal cell carcinoma) 2019    right ear pinna   • Bilateral inguinal hernia     observation thinner  SKIN: no rashes,no suspicious lesions  HEENT: atraumatic, normocephalic, throat is clear  EARS: Right cerumen impaction  NECK: supple,no adenopathy,no bruits  LUNGS: clear to auscultation  CARDIO: RRR without murmur  GI: good BS's,no masses, HSM o improved. Continue to observe pain patterns and see if they increase somewhat. Saw Lebron Champion in consultation. . Will refer to dr Ancelmo Silverio and /or gita in consultation.     The patient is asked to return in 2-3 months

## 2019-07-24 ENCOUNTER — OFFICE VISIT (OUTPATIENT)
Dept: PODIATRY CLINIC | Facility: CLINIC | Age: 84
End: 2019-07-24
Payer: MEDICARE

## 2019-07-24 DIAGNOSIS — M79.675 PAIN IN TOES OF BOTH FEET: Primary | ICD-10-CM

## 2019-07-24 DIAGNOSIS — B35.1 ONYCHOMYCOSIS: ICD-10-CM

## 2019-07-24 DIAGNOSIS — M79.674 PAIN IN TOES OF BOTH FEET: Primary | ICD-10-CM

## 2019-07-24 PROCEDURE — 11721 DEBRIDE NAIL 6 OR MORE: CPT | Performed by: PODIATRIST

## 2019-07-24 NOTE — PROGRESS NOTES
HPI:    Patient ID: Deng Clifton is a 80year old male. This 80-year-old male presents with recurrent pain associated with his toenails. He is accompanied by family. He has no other specific noted complaints.       ROS:     I did review medical status m noted.  I anticipate relief will see patient for care if and when symptoms redevelop         ASSESSMENT/PLAN:   Pain in toes of both feet  (primary encounter diagnosis)  Onychomycosis    No orders of the defined types were placed in this encounter.       Me

## 2019-08-16 ENCOUNTER — OFFICE VISIT (OUTPATIENT)
Dept: CARDIOLOGY | Age: 84
End: 2019-08-16

## 2019-08-16 VITALS
OXYGEN SATURATION: 99 % | SYSTOLIC BLOOD PRESSURE: 124 MMHG | WEIGHT: 167 LBS | DIASTOLIC BLOOD PRESSURE: 60 MMHG | HEIGHT: 72 IN | HEART RATE: 67 BPM | BODY MASS INDEX: 22.62 KG/M2

## 2019-08-16 DIAGNOSIS — I35.0 NONRHEUMATIC AORTIC VALVE STENOSIS: ICD-10-CM

## 2019-08-16 DIAGNOSIS — I10 ESSENTIAL HYPERTENSION: Primary | ICD-10-CM

## 2019-08-16 PROCEDURE — 99214 OFFICE O/P EST MOD 30 MIN: CPT | Performed by: INTERNAL MEDICINE

## 2019-08-16 RX ORDER — LOSARTAN POTASSIUM 25 MG/1
25 TABLET ORAL DAILY
Qty: 90 TABLET | Refills: 3 | Status: SHIPPED | OUTPATIENT
Start: 2019-08-16 | End: 2019-10-04

## 2019-08-23 ENCOUNTER — TELEPHONE (OUTPATIENT)
Dept: CARDIOLOGY | Age: 84
End: 2019-08-23

## 2019-08-23 DIAGNOSIS — I10 ESSENTIAL HYPERTENSION: ICD-10-CM

## 2019-08-24 ENCOUNTER — LAB ENCOUNTER (OUTPATIENT)
Dept: LAB | Facility: HOSPITAL | Age: 84
End: 2019-08-24
Attending: INTERNAL MEDICINE
Payer: MEDICARE

## 2019-08-24 DIAGNOSIS — I10 HTN (HYPERTENSION): Primary | ICD-10-CM

## 2019-08-24 LAB
ANION GAP SERPL CALC-SCNC: 7 MMOL/L
ANION GAP SERPL CALC-SCNC: 7 MMOL/L (ref 0–18)
BUN BLD-MCNC: 17 MG/DL (ref 7–18)
BUN SERPL-MCNC: 17 MG/DL
BUN/CREAT SERPL: 14.7
BUN/CREAT SERPL: 14.7 (ref 10–20)
CALCIUM BLD-MCNC: 9.4 MG/DL (ref 8.5–10.1)
CALCIUM SERPL-MCNC: 9.4 MG/DL
CHLORIDE SERPL-SCNC: 106 MMOL/L
CHLORIDE SERPL-SCNC: 106 MMOL/L (ref 98–112)
CO2 SERPL-SCNC: 29 MMOL/L
CO2 SERPL-SCNC: 29 MMOL/L (ref 21–32)
CREAT BLD-MCNC: 1.16 MG/DL (ref 0.7–1.3)
CREAT SERPL-MCNC: 1.16 MG/DL
GLUCOSE BLD-MCNC: 133 MG/DL (ref 70–99)
GLUCOSE SERPL-MCNC: 133 MG/DL
LENGTH OF FAST TIME PATIENT: NO H
OSMOLALITY SERPL CALC.SUM OF ELEC: 297 MOSM/KG (ref 275–295)
PATIENT FASTING: NO
POTASSIUM SERPL-SCNC: 3.7 MMOL/L
POTASSIUM SERPL-SCNC: 3.7 MMOL/L (ref 3.5–5.1)
SODIUM SERPL-SCNC: 142 MMOL/L
SODIUM SERPL-SCNC: 142 MMOL/L (ref 136–145)

## 2019-08-24 PROCEDURE — 36415 COLL VENOUS BLD VENIPUNCTURE: CPT

## 2019-08-24 PROCEDURE — 80048 BASIC METABOLIC PNL TOTAL CA: CPT

## 2019-08-26 ENCOUNTER — E-ADVICE (OUTPATIENT)
Dept: CARDIOLOGY | Age: 84
End: 2019-08-26

## 2019-08-26 ENCOUNTER — CLINICAL ABSTRACT (OUTPATIENT)
Dept: CARDIOLOGY | Age: 84
End: 2019-08-26

## 2019-09-01 ENCOUNTER — TELEPHONE (OUTPATIENT)
Dept: INTERNAL MEDICINE CLINIC | Facility: CLINIC | Age: 84
End: 2019-09-01

## 2019-09-01 DIAGNOSIS — N39.0 URINARY TRACT INFECTION WITHOUT HEMATURIA, SITE UNSPECIFIED: Primary | ICD-10-CM

## 2019-09-01 NOTE — TELEPHONE ENCOUNTER
Review pended refill request as it does not fall under a protocol.     Last Rx: 8/20/18  LOV: Recent Visits  Date Type Provider Dept   07/22/19 Office Visit Jolie Kim MD [facility]-Internal Med   06/11/19 Office Visit Jolie Kim MD [facility]-Int

## 2019-09-02 RX ORDER — ALLOPURINOL 300 MG/1
300 TABLET ORAL DAILY
Qty: 30 TABLET | Refills: 3 | Status: SHIPPED | OUTPATIENT
Start: 2019-09-02 | End: 2020-01-07

## 2019-09-02 NOTE — TELEPHONE ENCOUNTER
Please order a urinalysis with culture on the patient under my name and call the patient’s daughter to say to bring the specimen in to the hospital.

## 2019-09-02 NOTE — TELEPHONE ENCOUNTER
----- Message from Gabrielle Campos. Bob Richardson sent at 9/1/2019 12:25 PM CDT -----  Regarding: Other  Contact: [phone]  Hello - For about one week, our dad has been extremely tired and sleeping quite a lot - not his usual energy at all. Fatigued and weakness.   Aw

## 2019-09-03 ENCOUNTER — APPOINTMENT (OUTPATIENT)
Dept: LAB | Facility: HOSPITAL | Age: 84
DRG: 689 | End: 2019-09-03
Attending: INTERNAL MEDICINE
Payer: MEDICARE

## 2019-09-03 ENCOUNTER — APPOINTMENT (OUTPATIENT)
Dept: GENERAL RADIOLOGY | Facility: HOSPITAL | Age: 84
DRG: 689 | End: 2019-09-03
Attending: EMERGENCY MEDICINE
Payer: MEDICARE

## 2019-09-03 ENCOUNTER — HOSPITAL ENCOUNTER (INPATIENT)
Facility: HOSPITAL | Age: 84
LOS: 2 days | Discharge: HOME HEALTH | DRG: 689 | End: 2019-09-05
Attending: EMERGENCY MEDICINE | Admitting: HOSPITALIST
Payer: MEDICARE

## 2019-09-03 DIAGNOSIS — N39.0 URINARY TRACT INFECTION WITHOUT HEMATURIA, SITE UNSPECIFIED: ICD-10-CM

## 2019-09-03 DIAGNOSIS — R53.1 WEAKNESS GENERALIZED: Primary | ICD-10-CM

## 2019-09-03 DIAGNOSIS — R26.2 DIFFICULTY WALKING: ICD-10-CM

## 2019-09-03 PROCEDURE — 71045 X-RAY EXAM CHEST 1 VIEW: CPT | Performed by: EMERGENCY MEDICINE

## 2019-09-03 PROCEDURE — 87086 URINE CULTURE/COLONY COUNT: CPT

## 2019-09-03 PROCEDURE — 99223 1ST HOSP IP/OBS HIGH 75: CPT | Performed by: HOSPITALIST

## 2019-09-03 PROCEDURE — 81001 URINALYSIS AUTO W/SCOPE: CPT

## 2019-09-03 RX ORDER — SODIUM CHLORIDE 0.9 % (FLUSH) 0.9 %
3 SYRINGE (ML) INJECTION AS NEEDED
Status: DISCONTINUED | OUTPATIENT
Start: 2019-09-03 | End: 2019-09-05

## 2019-09-03 RX ORDER — ACETAMINOPHEN 325 MG/1
650 TABLET ORAL EVERY 6 HOURS PRN
Status: DISCONTINUED | OUTPATIENT
Start: 2019-09-03 | End: 2019-09-05

## 2019-09-03 RX ORDER — FUROSEMIDE 10 MG/ML
20 INJECTION INTRAMUSCULAR; INTRAVENOUS ONCE
Status: COMPLETED | OUTPATIENT
Start: 2019-09-03 | End: 2019-09-03

## 2019-09-03 RX ORDER — ALFUZOSIN HYDROCHLORIDE 10 MG/1
10 TABLET, EXTENDED RELEASE ORAL
Status: DISCONTINUED | OUTPATIENT
Start: 2019-09-03 | End: 2019-09-05

## 2019-09-03 RX ORDER — LOSARTAN POTASSIUM 25 MG/1
25 TABLET ORAL DAILY
Status: ON HOLD | COMMUNITY
End: 2019-09-05

## 2019-09-03 RX ORDER — ONDANSETRON 2 MG/ML
4 INJECTION INTRAMUSCULAR; INTRAVENOUS EVERY 6 HOURS PRN
Status: DISCONTINUED | OUTPATIENT
Start: 2019-09-03 | End: 2019-09-05

## 2019-09-03 RX ORDER — ATORVASTATIN CALCIUM 10 MG/1
10 TABLET, FILM COATED ORAL NIGHTLY
Status: DISCONTINUED | OUTPATIENT
Start: 2019-09-03 | End: 2019-09-05

## 2019-09-03 NOTE — TELEPHONE ENCOUNTER
Spoke with edita Sommer (MICKEY verified) and relayed PVR message below--she verbalizes understanding and agreement and will go to [facility] lab today to  urine specimen kit.  She will also call Dr. Belia Goodrich office today--may see Dr. Belia Reed Learn APN so

## 2019-09-03 NOTE — ED PROVIDER NOTES
Patient Seen in: [facility] AND [facility] Emergency Department    History   Patient presents with:  Dizziness (neurologic)    Stated Complaint: weakness    HPI    80-year-old-year-old male patient presents her complaining of progressively worsening generalized weakness  Other systems are as noted in HPI. Constitutional and vital signs reviewed. All other systems reviewed and negative except as noted above. Physical Exam     ED Triage Vitals [09/03/19 1701]   BP (!) 171/72   Pulse 64   Resp 16   Temp 98. DIFFERENTIAL WITH PLATELET.   Procedure                               Abnormality         Status                     ---------                               -----------         ------                     CBC W/ DIFFERENTIAL[[id number]]          Abnormal

## 2019-09-04 LAB
ANION GAP SERPL CALC-SCNC: 7 MMOL/L
BUN SERPL-MCNC: 19 MG/DL
BUN/CREAT SERPL: 17.4
CALCIUM SERPL-MCNC: 9.3 MG/DL
CHLORIDE SERPL-SCNC: 103 MMOL/L
CO2 SERPL-SCNC: 31 MMOL/L
CREAT SERPL-MCNC: 1.09 MG/DL
GLUCOSE SERPL-MCNC: 93 MG/DL
POTASSIUM SERPL-SCNC: 4.6 MMOL/L
SODIUM SERPL-SCNC: 141 MMOL/L

## 2019-09-04 PROCEDURE — 99222 1ST HOSP IP/OBS MODERATE 55: CPT | Performed by: INTERNAL MEDICINE

## 2019-09-04 RX ORDER — ALLOPURINOL 300 MG/1
300 TABLET ORAL DAILY
Status: DISCONTINUED | OUTPATIENT
Start: 2019-09-04 | End: 2019-09-05

## 2019-09-04 RX ORDER — CALCIUM POLYCARBOPHIL 625 MG 625 MG/1
1350 TABLET ORAL DAILY
Status: DISCONTINUED | OUTPATIENT
Start: 2019-09-04 | End: 2019-09-05

## 2019-09-04 RX ORDER — PANTOPRAZOLE SODIUM 20 MG/1
20 TABLET, DELAYED RELEASE ORAL
Status: DISCONTINUED | OUTPATIENT
Start: 2019-09-04 | End: 2019-09-05

## 2019-09-04 RX ORDER — AMLODIPINE BESYLATE 2.5 MG/1
2.5 TABLET ORAL DAILY
Status: DISCONTINUED | OUTPATIENT
Start: 2019-09-05 | End: 2019-09-04

## 2019-09-04 RX ORDER — RUFINAMIDE 40 MG/ML
1 SUSPENSION ORAL DAILY
Status: DISCONTINUED | OUTPATIENT
Start: 2019-09-04 | End: 2019-09-05

## 2019-09-04 RX ORDER — AMLODIPINE BESYLATE 2.5 MG/1
2.5 TABLET ORAL DAILY
Status: DISCONTINUED | OUTPATIENT
Start: 2019-09-04 | End: 2019-09-05

## 2019-09-04 NOTE — PROGRESS NOTES
FIBER ADULT GUMMIES CHEW 3-4 tablet  is Non-Formulary Medication &  Auto-Substituted to  Calcium Polycarbophil 2 tabs Per P&T PROTOCOL

## 2019-09-04 NOTE — H&P
[facility]    PATIENT'S NAME: Nader Sibley   ATTENDING PHYSICIAN: Magali Ace MD   PATIENT ACCOUNT#:   [de-identified]    LOCATION:  [place] [address]  MEDICAL RECORD #:   [id number]       YOB: 1926  ADMISSION DATE:       09/03/20 REVIEW OF SYSTEMS:  The patient said he is progressively becoming weaker and drowsy since his losartan was started. He was started on 25 mg in August last month.   Since then, he has been feeling lightheaded, dizzy, especially when he stands up and tri consult, Dr. Marry Rivers, to be notified. Place patient on fall precautions. Obtain physical and occupational therapy. Further recommendations to follow.     Dictated By Emperatriz Burgos MD  d: 09/03/2019 19:07:05  t: 09/03/2019 20:35:48  Job [id number]

## 2019-09-04 NOTE — ED NOTES
Attempted to give report to Ravindra Puri RN. Ravindra Puri is unable to take report at this time, writer informed RN transport already booked d/t ED code surge.

## 2019-09-04 NOTE — PHYSICAL THERAPY NOTE
PHYSICAL THERAPY EVALUATION - INPATIENT     Room Number: 477/630-W  Evaluation Date: 9/4/2019  Type of Evaluation: Initial   Physician Order: PT Eval and Treat    Presenting Problem: p/w generalized fatigue, weakness  Reason for Therapy: Mobility Dysfunct impairment may benefit from home with HHC. Anticipate pt will be able to d/c home safely with assist from family and continued acute PT. PT recommendation home with 24 hr supervision, HHPT/OT at d/c.      Patient will benefit from continued IP PT services t rolling walker for mobility. Assist for IADLs, cooking, cleaning, driving. Pt sponge bathes. Pt has caregiver 3x/week to assist for meal prep, cleaning. Per dtr, the dtrs and caregiver rotate to provide 24 hr supervision for pt.  Pt denies recent falls; dtr (G-Code): CK    FUNCTIONAL ABILITY STATUS  Gait Assessment   Gait Assistance: Contact guard assist  Distance (ft): 75' x 1  Assistive Device: Rolling walker  Pattern: Shuffle(flexed posture)  Stoop/Curb Assistance: Not tested     Bed Mobility: not tested,

## 2019-09-04 NOTE — ED NOTES
Assumed care of patient. Resting on ED cart with family present at bedside. Denies any complaints at this time. Steady gait to bathroom with walker and staff assist x2, attempting to give urine sample at this time. Updated on POC.

## 2019-09-04 NOTE — CONSULTS
[facility]    PATIENT'S NAME: Maritza Dates   ATTENDING PHYSICIAN: Andi Frias MD   CONSULTING PHYSICIAN: Ya Bansal.  Ke Puente MD   PATIENT ACCOUNT#:   [id number]    [facility]   [id number]       DATE OF BIRTH:  [birth year] Blood pressure 136/64, respirations 16, pulse 56, irregular. Saturation 100% on room air. HEENT:  Unremarkable. NECK:  Supple, with jugular venous pressure normal.  Carotids normal.  No bruits. No thyromegaly. LUNGS:  Clear.   HEART:  S1, S2 normal.

## 2019-09-04 NOTE — PROGRESS NOTES
CENTRUM SILVER ULTRA MENS TABS 1 tablet  is Non-Formulary Medication &  Auto-Substituted to CENTRUM SILVER ULTRA MENS TABS 1 tablet Per P&T PROTOCOL

## 2019-09-04 NOTE — OCCUPATIONAL THERAPY NOTE
OCCUPATIONAL THERAPY EVALUATION - INPATIENT     Room Number: 006/901-K  Evaluation Date: 9/4/2019  Type of Evaluation: Initial  Presenting Problem: worsening fatigue     Physician Order: IP Consult to Occupational Therapy  Reason for Therapy: ADL/IADL Dysf benefit from [facility]. DISCHARGE RECOMMENDATIONS  OT Discharge Recommendations: Home with home health PT/OT;24 hour care/supervision       PLAN  OT Treatment Plan: Balance activities; ADL training;Functional transfer training; Endurance training;Equipment ev move around just fine\"    OCCUPATIONAL THERAPY EXAMINATION      OBJECTIVE  Precautions: Cardiac  Fall Risk: Standard fall risk    PAIN ASSESSMENT  Ratin          ACTIVITY TOLERANCE  Pre-activity, supine:  SPO2 99% on room air  HR 69 bpm  BP /66 reach;RN aware of session/findings; Family present    OT Goals  Patients self stated goal is:home      Patient will complete functional transfer with Mod I  Comment:     Patient will complete toileting with Mod I  Comment:     Patient will tolerate standing

## 2019-09-04 NOTE — CM/SW NOTE
CM was provided by POA the copy of POA documents however, POLST is not filled. Per POA needs time to review, informed needs witness upon signature. Registration will scan POA docs into pt's chart.     CM/SW to remain available for support and/or discharg

## 2019-09-04 NOTE — CONSULTS
Cardiology (consult dictated)    Assessment:  1. General malaise, weakness, fatigue. Probably multifactorial.  Has evidence of UTI. Has moderate aortic stenosis. Came in with component of CHF. Recently started on losartan for hypertension.   Current sy

## 2019-09-04 NOTE — PLAN OF CARE
Problem: Patient Centered Care  Goal: Patient preferences are identified and integrated in the patient's plan of care  Description  Interventions:  - What would you like us to know as we care for you?  My family is very involve in my care  - Provide timel ordered  - Instruct patient on fluid and nutrition restrictions as appropriate  Outcome: Progressing     Problem: SKIN/TISSUE INTEGRITY - ADULT  Goal: Skin integrity remains intact  Description  INTERVENTIONS  - Assess and document risk factors for pressur

## 2019-09-04 NOTE — ED NOTES
ED admit room changed with a different RN. Estephanie Arce called for report, unable to take report at this time. Aware transport already booked d/t ED code surge.

## 2019-09-04 NOTE — PHYSICAL THERAPY NOTE
Chart reviewed, attempted to see pt for PT eval x 2 this AM. Pt refusing on 1st attempt as he was eating breakfast. On second attempt, pt reporting frustration regarding delay in testing and Dr. Visit.  Pt refusing PT/OT at this time d/t mood, requesting f/

## 2019-09-04 NOTE — ED NOTES
Report given to Myron Perry with no further questions. Transport arrived to take patient to floor. Family present for transport.

## 2019-09-05 ENCOUNTER — APPOINTMENT (OUTPATIENT)
Dept: CV DIAGNOSTICS | Facility: HOSPITAL | Age: 84
DRG: 689 | End: 2019-09-05
Attending: HOSPITALIST
Payer: MEDICARE

## 2019-09-05 VITALS
OXYGEN SATURATION: 99 % | RESPIRATION RATE: 16 BRPM | SYSTOLIC BLOOD PRESSURE: 132 MMHG | WEIGHT: 161.5 LBS | TEMPERATURE: 98 F | BODY MASS INDEX: 22.61 KG/M2 | HEIGHT: 71 IN | HEART RATE: 66 BPM | DIASTOLIC BLOOD PRESSURE: 74 MMHG

## 2019-09-05 PROCEDURE — 93306 TTE W/DOPPLER COMPLETE: CPT | Performed by: HOSPITALIST

## 2019-09-05 PROCEDURE — 99239 HOSP IP/OBS DSCHRG MGMT >30: CPT | Performed by: HOSPITALIST

## 2019-09-05 PROCEDURE — 99232 SBSQ HOSP IP/OBS MODERATE 35: CPT | Performed by: INTERNAL MEDICINE

## 2019-09-05 NOTE — PLAN OF CARE
Problem: Patient Centered Care  Goal: Patient preferences are identified and integrated in the patient's plan of care  Description  Interventions:  - What would you like us to know as we care for you?  My family is very involve in my care  - Provide timel appropriate  - Fluid restriction as ordered  - Instruct patient on fluid and nutrition restrictions as appropriate  Outcome: Adequate for Discharge     Problem: SKIN/TISSUE INTEGRITY - ADULT  Goal: Skin integrity remains intact  Description  INTERVENTIONS

## 2019-09-05 NOTE — PLAN OF CARE
Problem: Patient Centered Care  Goal: Patient preferences are identified and integrated in the patient's plan of care  Description  Interventions:  - What would you like us to know as we care for you?  My family is very involve in my care  - Provide timel results as appropriate  - Fluid restriction as ordered  - Instruct patient on fluid and nutrition restrictions as appropriate  Outcome: Progressing     Problem: SKIN/TISSUE INTEGRITY - ADULT  Goal: Skin integrity remains intact  Description  INTERVENTIONS

## 2019-09-05 NOTE — OCCUPATIONAL THERAPY NOTE
Attempt made for OT treatment session. Per EMR (physical therapy had attempted tx as well --10 min prior) and discussion with RN Mikel Montemayor, pt not feeling well and just returned to bed. Will re-attempt this PM as pt is appropriate and as schedule allows.

## 2019-09-05 NOTE — CM/SW NOTE
9/5: JOSI received MDO for home health orders. Referral into [facility], orders are in, f2f is complete. Patient to d/c home with family. SW/CM to remain available for support and/or discharge planning.      [address] [id number]

## 2019-09-05 NOTE — PROGRESS NOTES
[facility]  Progress Note    Delta Jimenez Patient Status:  Inpatient    1926 MRN [id number]   Location [facility] 3W/SW Attending Morgan Perez MD   Hosp Day # 2 PCP Silvino Mitchell MD     Assessment:    1.   Weakness and f Peripheral pulses are 2+. Skin: Warm and dry.      Labs:     Lab Results   Component Value Date    PT 14.1 06/02/2014    INR 1.1 06/02/2014           Lab Results   Component Value Date    TROP <0.045 03/28/2019        Medications:    • allopurinol  300 mg

## 2019-09-05 NOTE — HOME CARE LIAISON
Met with patient at the bedside. Patient is agreeable to [facility]. Residential brochure provided with contact information. All questions addressed and answered.

## 2019-09-05 NOTE — PHYSICAL THERAPY NOTE
Chart reviewed, attempted to see pt for PT tx. Pt received in bed, reports episode of nausea and dizziness, recently assisted back to bed. Pt vitals taken: BP /71 mmHg, HR 60 bpm, SPO2 99% on room air.  Pt reports dizziness still present, however imp

## 2019-09-06 ENCOUNTER — TELEPHONE (OUTPATIENT)
Dept: INTERNAL MEDICINE CLINIC | Facility: CLINIC | Age: 84
End: 2019-09-06

## 2019-09-06 ENCOUNTER — PATIENT OUTREACH (OUTPATIENT)
Dept: CASE MANAGEMENT | Age: 84
End: 2019-09-06

## 2019-09-06 ENCOUNTER — TELEPHONE (OUTPATIENT)
Dept: CARDIOLOGY | Age: 84
End: 2019-09-06

## 2019-09-06 DIAGNOSIS — I48.19 PERSISTENT ATRIAL FIBRILLATION (CMD): Primary | ICD-10-CM

## 2019-09-06 DIAGNOSIS — R53.1 WEAKNESS GENERALIZED: ICD-10-CM

## 2019-09-06 DIAGNOSIS — Z02.9 ENCOUNTERS FOR ADMINISTRATIVE PURPOSE: ICD-10-CM

## 2019-09-06 PROCEDURE — 1111F DSCHRG MED/CURRENT MED MERGE: CPT

## 2019-09-06 NOTE — CDS QUERY
.Heart Failure  CLINICAL DOCUMENTATION CLARIFICATION FORM  Dear Doctor: Martin Gao     Per Dr. Lillian Miles progress note \"Mild CHF on admission, resolved\". Could you further specify the acuity and the type of CHF that was treated and resolved by discharge?

## 2019-09-06 NOTE — TELEPHONE ENCOUNTER
Natali November from [facility] calling and states the admitted the pt to [facility] he just got home from hospital and can you confirmed his orders and all the meds      Please advise

## 2019-09-06 NOTE — PROGRESS NOTES
Initial Post Discharge Follow Up   Discharge Date: 9/5/19  Contact Date: 9/6/2019    Consent Verification:  Assessment Completed With: Other: daughter Leandro Sanchez received per patient?  written  HIPAA Verified?   Yes    Discharge Dx:   Beny Mcfarlane above 140 Disp:  Rfl:    lidocaine 5 % External Patch Place 1 patch onto the skin daily. Disp: 30 patch Rfl: 1   apixaban 5 MG Oral Tab Take 1 tablet (5 mg total) by mouth 2 (two) times daily.  Disp: 60 tablet Rfl: 11   FIBER ADULT GUMMIES OR Take 3-4 table TCM/HFU appointment: scheduled at D/C within 7-14 days  no     NCM Reviewed/scheduled/rescheduled PCP TCM/HFU appointment with pt:  Yes      Have you made all of your follow up appointments?  yes    Is there any reason as to why you cannot make your appoint

## 2019-09-09 ENCOUNTER — ANCILLARY PROCEDURE (OUTPATIENT)
Dept: CARDIOLOGY | Age: 84
End: 2019-09-09
Attending: INTERNAL MEDICINE

## 2019-09-09 DIAGNOSIS — I48.19 PERSISTENT ATRIAL FIBRILLATION (CMD): ICD-10-CM

## 2019-09-09 NOTE — TELEPHONE ENCOUNTER
Spoke to [employer], pt needed verbal order for PT/OT, given and she states orders were faxed to our office for VR to sign.  Please keep and eye out thanks

## 2019-09-10 ENCOUNTER — OFFICE VISIT (OUTPATIENT)
Dept: INTERNAL MEDICINE CLINIC | Facility: CLINIC | Age: 84
End: 2019-09-10
Payer: MEDICARE

## 2019-09-10 VITALS
HEIGHT: 71 IN | WEIGHT: 165 LBS | RESPIRATION RATE: 16 BRPM | DIASTOLIC BLOOD PRESSURE: 70 MMHG | SYSTOLIC BLOOD PRESSURE: 138 MMHG | HEART RATE: 79 BPM | BODY MASS INDEX: 23.1 KG/M2

## 2019-09-10 DIAGNOSIS — I48.19 PERSISTENT ATRIAL FIBRILLATION (HCC): Primary | ICD-10-CM

## 2019-09-10 DIAGNOSIS — I10 ESSENTIAL HYPERTENSION WITH GOAL BLOOD PRESSURE LESS THAN 140/90: ICD-10-CM

## 2019-09-10 PROCEDURE — 99495 TRANSJ CARE MGMT MOD F2F 14D: CPT | Performed by: INTERNAL MEDICINE

## 2019-09-10 PROCEDURE — 1111F DSCHRG MED/CURRENT MED MERGE: CPT | Performed by: INTERNAL MEDICINE

## 2019-09-10 RX ORDER — FUROSEMIDE 20 MG/1
20 TABLET ORAL AS NEEDED
Qty: 30 TABLET | Refills: 0 | Status: ON HOLD | OUTPATIENT
Start: 2019-09-10 | End: 2020-11-06

## 2019-09-10 NOTE — PROGRESS NOTES
HPI:    Deng Clifton is a 80year old male here today for hospital follow up.    He was discharged from Inpatient hospital, [facility]  to Home   Admission Date: 9/3/19   Discharge Date: 9/5/19  Hospital Discharge Diagnoses (since 8/11/2019)   Non CAPSULE BY MOUTH ONCE DAILY IN THE MORNING   tamsulosin HCl 0.4 MG Oral Cap Take 1 capsule (0.4 mg total) by mouth daily. amLODIPine Besylate 2.5 MG Oral Tab Take 2.5 mg by mouth daily.  Only take if BP is above 150    lidocaine 5 % External Patch Place 1 exertion  CARDIOVASCULAR: denies chest pain on exertion or palpitations  GI: denies abdominal pain, denies heartburn, denies diarrhea  MUSCULOSKELETAL: denies pain, normal range of motion of extremities  NEURO: denies headaches, denies dizziness, denies we follow a low salt diet as discussed. Regular exercise at least 3 times weekly will help to curb one's appetite, control weight and lead to better blood pressure control. Record blood pressures at home and bring readings to your next office visit to review.

## 2019-09-13 ENCOUNTER — TELEPHONE (OUTPATIENT)
Dept: INTERNAL MEDICINE CLINIC | Facility: CLINIC | Age: 84
End: 2019-09-13

## 2019-09-13 NOTE — TELEPHONE ENCOUNTER
Lazarus Barnes from [facility] stated theres an add-on discipline order and 485 form that needs to be sign on the web portal.     Thank you.

## 2019-09-17 ENCOUNTER — PATIENT MESSAGE (OUTPATIENT)
Dept: INTERNAL MEDICINE CLINIC | Facility: CLINIC | Age: 84
End: 2019-09-17

## 2019-09-17 ENCOUNTER — NURSE TRIAGE (OUTPATIENT)
Dept: OTHER | Age: 84
End: 2019-09-17

## 2019-09-17 RX ORDER — BENZONATATE 100 MG/1
100 CAPSULE ORAL 3 TIMES DAILY PRN
Refills: 0 | Status: CANCELLED | OUTPATIENT
Start: 2019-09-17

## 2019-09-17 RX ORDER — BENZONATATE 100 MG/1
100 CAPSULE ORAL 3 TIMES DAILY PRN
Qty: 30 CAPSULE | Refills: 1 | Status: SHIPPED | OUTPATIENT
Start: 2019-09-17 | End: 2019-11-19

## 2019-09-17 NOTE — TELEPHONE ENCOUNTER
See acute TE 9/17/19. From: [place]  To: Hola Valderrama MD  Sent: 9/17/2019  8:17 AM CDT  Subject: Other    Good morning - Dad has had a cough/with cold symptoms since 9/7.   It popped up two days after his discharge from hospital.  No fever,

## 2019-09-17 NOTE — TELEPHONE ENCOUNTER
Mahin Quintanilla Rx has been pended for your review/approval as RN cannot decide on quantity and directions. Staff to contact pt after Rx is signed.     Please reply to destiny: YANA Amado

## 2019-09-17 NOTE — TELEPHONE ENCOUNTER
Please reply to pool: EM RN TRIAGE  Action Requested: Summary for Provider     []  Critical Lab, Recommendations Needed  [x] Need Additional Advice  []   FYI    [x]   Need Orders  [x] Need Medications Sent to Pharmacy  []  Other     SUMMARY: DR Araya Nu

## 2019-09-17 NOTE — TELEPHONE ENCOUNTER
----- Message from Shahriar Mann. Radha Lynn sent at 9/17/2019  8:17 AM CDT -----  Regarding: Other  Contact: [phone]  Good morning - Dad has had a cough/with cold symptoms since 9/7.   It popped up two days after his discharge from hospital.  No fever, headache,

## 2019-09-20 RX ORDER — LOVASTATIN 40 MG/1
TABLET ORAL
Qty: 90 TABLET | Refills: 2 | Status: SHIPPED | OUTPATIENT
Start: 2019-09-20 | End: 2020-06-23 | Stop reason: SDUPTHER

## 2019-10-02 ENCOUNTER — TELEPHONE (OUTPATIENT)
Dept: CARDIOLOGY | Age: 84
End: 2019-10-02

## 2019-10-02 ENCOUNTER — E-ADVICE (OUTPATIENT)
Dept: CARDIOLOGY | Age: 84
End: 2019-10-02

## 2019-10-02 RX ORDER — FUROSEMIDE 20 MG/1
20 TABLET ORAL DAILY PRN
COMMUNITY
Start: 2019-09-10

## 2019-10-02 RX ORDER — BENZONATATE 100 MG/1
100 CAPSULE ORAL DAILY PRN
COMMUNITY
Start: 2019-09-17 | End: 2020-01-10

## 2019-10-04 ENCOUNTER — OFFICE VISIT (OUTPATIENT)
Dept: CARDIOLOGY | Age: 84
End: 2019-10-04

## 2019-10-04 ENCOUNTER — TELEPHONE (OUTPATIENT)
Dept: CARDIOLOGY | Age: 84
End: 2019-10-04

## 2019-10-04 VITALS
HEART RATE: 61 BPM | WEIGHT: 168 LBS | HEIGHT: 71 IN | BODY MASS INDEX: 23.52 KG/M2 | OXYGEN SATURATION: 98 % | DIASTOLIC BLOOD PRESSURE: 60 MMHG | SYSTOLIC BLOOD PRESSURE: 120 MMHG

## 2019-10-04 DIAGNOSIS — I48.19 PERSISTENT ATRIAL FIBRILLATION (CMD): Primary | ICD-10-CM

## 2019-10-04 DIAGNOSIS — I10 ESSENTIAL HYPERTENSION: ICD-10-CM

## 2019-10-04 DIAGNOSIS — I35.0 NONRHEUMATIC AORTIC VALVE STENOSIS: ICD-10-CM

## 2019-10-04 PROCEDURE — 99214 OFFICE O/P EST MOD 30 MIN: CPT | Performed by: INTERNAL MEDICINE

## 2019-10-04 RX ORDER — AMLODIPINE BESYLATE 2.5 MG/1
2.5 TABLET ORAL PRN
COMMUNITY

## 2019-10-04 ASSESSMENT — PATIENT HEALTH QUESTIONNAIRE - PHQ9
1. LITTLE INTEREST OR PLEASURE IN DOING THINGS: NOT AT ALL
2. FEELING DOWN, DEPRESSED OR HOPELESS: NOT AT ALL
2. FEELING DOWN, DEPRESSED OR HOPELESS: NOT AT ALL
SUM OF ALL RESPONSES TO PHQ9 QUESTIONS 1 AND 2: 0
1. LITTLE INTEREST OR PLEASURE IN DOING THINGS: NOT AT ALL
SUM OF ALL RESPONSES TO PHQ9 QUESTIONS 1 AND 2: 0
SUM OF ALL RESPONSES TO PHQ9 QUESTIONS 1 AND 2: 0

## 2019-10-08 PROCEDURE — 93272 ECG/REVIEW INTERPRET ONLY: CPT | Performed by: INTERNAL MEDICINE

## 2019-10-14 NOTE — DISCHARGE SUMMARY
[place] HOSPITALIST  DISCHARGE SUMMARY     Willie Olivarez Patient Status:  Inpatient    1926 MRN [id number]   Location [facility] 3W/SW Attending No att. providers found   Hosp Day # 2 PCP Krista Izaguirre MD     Date of Admission: 9/3/20 details   allopurinol 300 MG Tabs  Commonly known as:  ZYLOPRIM      Take 1 tablet (300 mg total) by mouth daily.    Stop taking on:  December 1, 2019  Quantity:  30 tablet  Refills:  3     amLODIPine Besylate 2.5 MG Tabs  Commonly known as:  Edwin Jaeger edema.  -----------------------------------------------------------------------------------------------  PATIENT DISCHARGE INSTRUCTIONS: See electronic chart      Hospital Discharge Diagnoses: dizziness    Lace+ Score: 73  59-90 High Risk  29-58 Medium Ris

## 2019-10-16 ENCOUNTER — E-ADVICE (OUTPATIENT)
Dept: CARDIOLOGY | Age: 84
End: 2019-10-16

## 2019-10-25 ENCOUNTER — E-ADVICE (OUTPATIENT)
Dept: CARDIOLOGY | Age: 84
End: 2019-10-25

## 2019-10-28 ENCOUNTER — TELEPHONE (OUTPATIENT)
Dept: INTERNAL MEDICINE CLINIC | Facility: CLINIC | Age: 84
End: 2019-10-28

## 2019-10-28 NOTE — TELEPHONE ENCOUNTER
Jonathan from [facility] home [facility] calling and states need orders for discharged she called last week and no response       Please advise

## 2019-10-30 ENCOUNTER — OFFICE VISIT (OUTPATIENT)
Dept: PODIATRY CLINIC | Facility: CLINIC | Age: 84
End: 2019-10-30
Payer: MEDICARE

## 2019-10-30 DIAGNOSIS — M79.675 PAIN IN TOES OF BOTH FEET: Primary | ICD-10-CM

## 2019-10-30 DIAGNOSIS — M79.674 PAIN IN TOES OF BOTH FEET: Primary | ICD-10-CM

## 2019-10-30 DIAGNOSIS — B35.1 ONYCHOMYCOSIS: ICD-10-CM

## 2019-10-30 PROCEDURE — 11721 DEBRIDE NAIL 6 OR MORE: CPT | Performed by: PODIATRIST

## 2019-10-30 NOTE — PROGRESS NOTES
HPI:    Patient ID: Santhosh Shipley is a 80year old male. 71-year-old male presents with recurrent pain associated with his toenails. He reports relief by previous treatment.       ROS:     I did review medical status medications were noted and he has no k were noted upon debridement. I anticipate relief and will see patient for care if and when symptoms redevelop. He was accompanied today by his daughter.          ASSESSMENT/PLAN:   Pain in toes of both feet  (primary encounter diagnosis)  Onychomycosis

## 2019-11-19 ENCOUNTER — OFFICE VISIT (OUTPATIENT)
Dept: INTERNAL MEDICINE CLINIC | Facility: CLINIC | Age: 84
End: 2019-11-19
Payer: MEDICARE

## 2019-11-19 VITALS
BODY MASS INDEX: 24.22 KG/M2 | WEIGHT: 173 LBS | SYSTOLIC BLOOD PRESSURE: 132 MMHG | DIASTOLIC BLOOD PRESSURE: 68 MMHG | HEART RATE: 76 BPM | HEIGHT: 71 IN

## 2019-11-19 DIAGNOSIS — C44.212 BASAL CELL CARCINOMA (BCC) OF HELIX OF RIGHT EAR: ICD-10-CM

## 2019-11-19 DIAGNOSIS — M54.42 CHRONIC LEFT-SIDED LOW BACK PAIN WITH LEFT-SIDED SCIATICA: ICD-10-CM

## 2019-11-19 DIAGNOSIS — I10 ESSENTIAL HYPERTENSION WITH GOAL BLOOD PRESSURE LESS THAN 140/90: ICD-10-CM

## 2019-11-19 DIAGNOSIS — G89.29 CHRONIC LEFT-SIDED LOW BACK PAIN WITH LEFT-SIDED SCIATICA: ICD-10-CM

## 2019-11-19 DIAGNOSIS — Z23 NEED FOR VACCINATION: ICD-10-CM

## 2019-11-19 DIAGNOSIS — I48.19 PERSISTENT ATRIAL FIBRILLATION (HCC): Primary | ICD-10-CM

## 2019-11-19 PROBLEM — C44.219 BASAL CELL CARCINOMA (BCC) OF AURICLE OF LEFT EAR: Status: ACTIVE | Noted: 2019-11-19

## 2019-11-19 PROCEDURE — 99214 OFFICE O/P EST MOD 30 MIN: CPT | Performed by: INTERNAL MEDICINE

## 2019-11-19 PROCEDURE — G0463 HOSPITAL OUTPT CLINIC VISIT: HCPCS | Performed by: INTERNAL MEDICINE

## 2019-11-19 PROCEDURE — G0008 ADMIN INFLUENZA VIRUS VAC: HCPCS | Performed by: INTERNAL MEDICINE

## 2019-11-19 PROCEDURE — 90662 IIV NO PRSV INCREASED AG IM: CPT | Performed by: INTERNAL MEDICINE

## 2019-11-19 NOTE — PROGRESS NOTES
Deven Martinez is a 80year old male. HPI:     1. Essential hypertension with goal blood pressure less than 140/90    Patient presents for recheck of his hypertension.  Pt has been taking medications as instructed, no medication side effects, home BP mon 12/23/2011 77 (H)     LDL Cholesterol (mg/dL)   Date Value   08/24/2018 79   08/28/2017 91   04/13/2017 98   04/10/2015 84   03/12/2012 94   12/23/2011 84     AST (U/L)   Date Value   03/29/2019 29   03/18/2019 28   12/10/2018 30   08/24/2018 39   08/28/ essential hypertension 2009      Past Surgical History:   Procedure Laterality Date   • CARPAL TUNNEL RELEASE Right 2009   • ELECTROCARDIOGRAM, COMPLETE  4-    scanned to media tab   • HIP REPLACEMENT SURGERY Left 1/5/2000    total   • KIDNEY SURGER and to follow a low salt diet as discussed. Regular exercise at least 3 times weekly will help to curb one's appetite, control weight and lead to better blood pressure control.  Record blood pressures at home and bring readings to your next office visit to

## 2019-12-30 RX ORDER — TAMSULOSIN HYDROCHLORIDE 0.4 MG/1
CAPSULE ORAL
Qty: 90 CAPSULE | Refills: 1 | Status: SHIPPED | OUTPATIENT
Start: 2019-12-30 | End: 2020-06-22

## 2019-12-31 NOTE — TELEPHONE ENCOUNTER
Review pended refill request as it does not fall under a protocol.     Last Rx: 5/30/19  LOV: 11/19/19

## 2020-01-03 ENCOUNTER — TELEPHONE (OUTPATIENT)
Dept: CARDIOLOGY | Age: 85
End: 2020-01-03

## 2020-01-03 ENCOUNTER — E-ADVICE (OUTPATIENT)
Dept: CARDIOLOGY | Age: 85
End: 2020-01-03

## 2020-01-07 ENCOUNTER — PATIENT MESSAGE (OUTPATIENT)
Dept: INTERNAL MEDICINE CLINIC | Facility: CLINIC | Age: 85
End: 2020-01-07

## 2020-01-07 RX ORDER — ALLOPURINOL 300 MG/1
300 TABLET ORAL DAILY
Qty: 90 TABLET | Refills: 1 | Status: SHIPPED | OUTPATIENT
Start: 2020-01-07 | End: 2020-04-06

## 2020-01-07 NOTE — TELEPHONE ENCOUNTER
Dr Nikki Cabrera,    See pts' mychart message and advise on pended allopurinol refill request.     Please reply to pool: YANA Peres

## 2020-01-07 NOTE — TELEPHONE ENCOUNTER
From: Santhosh Shipley  To: Helane Lundborg, MD  Sent: 1/7/2020 2:26 PM CST  Subject: Prescription Question    Hi and happy new year. [facility] has been trying to get in touch with you to approve a refill to the Allopurinol prescription my dad takes daily.

## 2020-01-10 ENCOUNTER — OFFICE VISIT (OUTPATIENT)
Dept: CARDIOLOGY | Age: 85
End: 2020-01-10

## 2020-01-10 VITALS
OXYGEN SATURATION: 97 % | HEIGHT: 71 IN | HEART RATE: 52 BPM | RESPIRATION RATE: 18 BRPM | WEIGHT: 169 LBS | BODY MASS INDEX: 23.66 KG/M2 | DIASTOLIC BLOOD PRESSURE: 82 MMHG | SYSTOLIC BLOOD PRESSURE: 140 MMHG

## 2020-01-10 DIAGNOSIS — I10 ESSENTIAL HYPERTENSION: ICD-10-CM

## 2020-01-10 DIAGNOSIS — I35.0 NONRHEUMATIC AORTIC VALVE STENOSIS: ICD-10-CM

## 2020-01-10 DIAGNOSIS — I48.19 PERSISTENT ATRIAL FIBRILLATION (CMD): Primary | ICD-10-CM

## 2020-01-10 PROCEDURE — 99214 OFFICE O/P EST MOD 30 MIN: CPT | Performed by: INTERNAL MEDICINE

## 2020-01-10 RX ORDER — ALLOPURINOL 300 MG/1
300 TABLET ORAL DAILY
Refills: 1 | COMMUNITY
Start: 2020-01-07

## 2020-01-16 ENCOUNTER — MED REC SCAN ONLY (OUTPATIENT)
Dept: INTERNAL MEDICINE CLINIC | Facility: CLINIC | Age: 85
End: 2020-01-16

## 2020-01-30 ENCOUNTER — PATIENT MESSAGE (OUTPATIENT)
Dept: OTOLARYNGOLOGY | Facility: CLINIC | Age: 85
End: 2020-01-30

## 2020-01-30 NOTE — TELEPHONE ENCOUNTER
From: Vickie Plaza  To: Constantino Harmon MD  Sent: 1/30/2020 12:48 PM CST  Subject: Non-Urgent Medical Question    Part 2:  Dr. Jodie Langley suggested I ask you if you would do this kind of surgery without holding the Eliquis.  If not, we may be able to revisit

## 2020-02-04 ENCOUNTER — OFFICE VISIT (OUTPATIENT)
Dept: PODIATRY CLINIC | Facility: CLINIC | Age: 85
End: 2020-02-04
Payer: MEDICARE

## 2020-02-04 DIAGNOSIS — M79.675 PAIN IN TOES OF BOTH FEET: Primary | ICD-10-CM

## 2020-02-04 DIAGNOSIS — B35.1 ONYCHOMYCOSIS: ICD-10-CM

## 2020-02-04 DIAGNOSIS — M79.674 PAIN IN TOES OF BOTH FEET: Primary | ICD-10-CM

## 2020-02-04 PROCEDURE — 11721 DEBRIDE NAIL 6 OR MORE: CPT | Performed by: PODIATRIST

## 2020-02-04 NOTE — PROGRESS NOTES
HPI:    Patient ID: Jeb Gray is a 80year old male. Is 80-year-old male presents with recurrent pain associated with his toenails. Patient reports relief by previous treatment. He is accompanied by a daughter.       ROS:       I did review medical s ASSESSMENT/PLAN:   Pain in toes of both feet  (primary encounter diagnosis)  Onychomycosis    No orders of the defined types were placed in this encounter.       Meds This Visit:  Requested Prescriptions      No prescriptions requested or ordered in this

## 2020-02-06 ENCOUNTER — OFFICE VISIT (OUTPATIENT)
Dept: OTOLARYNGOLOGY | Facility: CLINIC | Age: 85
End: 2020-02-06
Payer: MEDICARE

## 2020-02-06 VITALS
DIASTOLIC BLOOD PRESSURE: 75 MMHG | HEART RATE: 62 BPM | WEIGHT: 168 LBS | BODY MASS INDEX: 23.52 KG/M2 | HEIGHT: 71 IN | SYSTOLIC BLOOD PRESSURE: 132 MMHG

## 2020-02-06 DIAGNOSIS — C44.202 CANCER OF SKIN OF AURICLE OF RIGHT EAR: Primary | ICD-10-CM

## 2020-02-06 DIAGNOSIS — C44.329 SQUAMOUS CELL CANCER OF SKIN OF LEFT TEMPLE: ICD-10-CM

## 2020-02-06 PROCEDURE — 99214 OFFICE O/P EST MOD 30 MIN: CPT | Performed by: OTOLARYNGOLOGY

## 2020-02-06 PROCEDURE — G0463 HOSPITAL OUTPT CLINIC VISIT: HCPCS | Performed by: OTOLARYNGOLOGY

## 2020-02-06 NOTE — PROGRESS NOTES
Lucio Avendano is a 80year old male.   Patient presents with:  Lesion:  right ear lesion and lesion right temple , discuss surgery and possible to do on blood thinner       HISTORY OF PRESENT ILLNESS  He presents with a history of a lesion of the right ear hyperlipidemia and vascular disease       Past Medical History:   Diagnosis Date   • Atrial fibrillation ([facility] [place] [address]    • BCC (basal cell carcinoma) 2019    right ear pinna   • Bilateral inguinal hernia     observation   • [facility]   • Cholecy Left: Normal. Fundus - Right: Normal, Left: Normal.   Neurological Normal Memory - Normal. Cranial nerves - Cranial nerves II through XII grossly intact.    Head/Face Normal Facial features - Normal. Eyebrows - Normal. Skull - Normal.        Nasopharynx Nor mouth daily. , Disp: , Rfl:   •  Lovastatin 40 MG Oral Tab, Take 40 mg by mouth nightly.  , Disp: , Rfl:   ASSESSMENT AND PLAN    1. 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

## 2020-02-07 RX ORDER — SULFAMETHOXAZOLE AND TRIMETHOPRIM 800; 160 MG/1; MG/1
1 TABLET ORAL EVERY 12 HOURS
Qty: 14 TABLET | Refills: 0 | Status: SHIPPED | OUTPATIENT
Start: 2020-02-07 | End: 2020-02-20 | Stop reason: ALTCHOICE

## 2020-02-12 ENCOUNTER — LAB REQUISITION (OUTPATIENT)
Dept: LAB | Facility: HOSPITAL | Age: 85
End: 2020-02-12
Payer: MEDICARE

## 2020-02-12 ENCOUNTER — TELEPHONE (OUTPATIENT)
Dept: OTOLARYNGOLOGY | Facility: CLINIC | Age: 85
End: 2020-02-12

## 2020-02-12 DIAGNOSIS — Z01.89 ENCOUNTER FOR OTHER SPECIFIED SPECIAL EXAMINATIONS: ICD-10-CM

## 2020-02-12 PROCEDURE — 88305 TISSUE EXAM BY PATHOLOGIST: CPT | Performed by: OTOLARYNGOLOGY

## 2020-02-12 PROCEDURE — 88331 PATH CONSLTJ SURG 1 BLK 1SPC: CPT | Performed by: OTOLARYNGOLOGY

## 2020-02-12 PROCEDURE — 88311 DECALCIFY TISSUE: CPT | Performed by: OTOLARYNGOLOGY

## 2020-02-12 RX ORDER — CEPHALEXIN 500 MG/1
500 CAPSULE ORAL 3 TIMES DAILY
Qty: 21 CAPSULE | Refills: 0 | Status: SHIPPED | OUTPATIENT
Start: 2020-02-12 | End: 2020-02-19

## 2020-02-12 NOTE — TELEPHONE ENCOUNTER
Per daughter pt had surgery today, would like to schedule post op appointment. Please call thank you.

## 2020-02-12 NOTE — TELEPHONE ENCOUNTER
Pt scheduled for post op visit. Dr. Altaf Jimenez pt was asking if he was supposed to get an antibiotic post op ?  Please advise

## 2020-02-13 ENCOUNTER — PATIENT MESSAGE (OUTPATIENT)
Dept: INTERNAL MEDICINE CLINIC | Facility: CLINIC | Age: 85
End: 2020-02-13

## 2020-02-13 ENCOUNTER — TELEPHONE (OUTPATIENT)
Dept: OTOLARYNGOLOGY | Facility: CLINIC | Age: 85
End: 2020-02-13

## 2020-02-13 ENCOUNTER — PATIENT MESSAGE (OUTPATIENT)
Dept: OTOLARYNGOLOGY | Facility: CLINIC | Age: 85
End: 2020-02-13

## 2020-02-13 RX ORDER — ACETAMINOPHEN AND CODEINE PHOSPHATE 300; 30 MG/1; MG/1
1 TABLET ORAL EVERY 6 HOURS PRN
Qty: 30 TABLET | Refills: 0 | Status: SHIPPED | OUTPATIENT
Start: 2020-02-13 | End: 2020-06-16

## 2020-02-13 NOTE — TELEPHONE ENCOUNTER
Pt is post op day 1 right ear BCCA and right temple SCCA excision and reconstruction. Per pt daughter, pt doing ok, c/o pain last night and this morning it is an 8 on scale of 1-10.  Per daughter, tylenol is not helping, pt also did notice a small amount of

## 2020-02-13 NOTE — TELEPHONE ENCOUNTER
Dr Susanne Navarro please see note below informed patient that it is because patient is taking blood thinner but daughter wants you to look at the picture attached with the message.

## 2020-02-13 NOTE — TELEPHONE ENCOUNTER
From: Vickie Plaza  To: Constantino Goodrich. Mala Harmon MD  Sent: 2/13/2020 2:46 PM CST  Subject: Visit Follow-up Question    Hi, I am attaching 3 photos of dad's ear from yesterday, this morning and today about noon.  The area is becoming more black-n-blue - will you ple

## 2020-02-13 NOTE — TELEPHONE ENCOUNTER
Daughter called with complaint of patient having red painful bump on arm (See Media on message for picture).    Per daughter, Dr. Katie Otoole treated bump with Sulfamethoxazole-TMP -160 MG Oral Tab per tablet and mupirocin ointment and the surrounding redn

## 2020-02-14 NOTE — TELEPHONE ENCOUNTER
Followed up with daughter, is preferring not to come in today, patient had procedure done to his ear on the 12th, still recovering and is unable to wear a hat.  Is preferring to keep appt for 2/18, unless provider feels symptoms are urgent and he should be

## 2020-02-14 NOTE — TELEPHONE ENCOUNTER
Please let patient know that I can see them today in the office for evaluation of the lesion if they are able to come in.

## 2020-02-17 ENCOUNTER — PATIENT MESSAGE (OUTPATIENT)
Dept: OTOLARYNGOLOGY | Facility: CLINIC | Age: 85
End: 2020-02-17

## 2020-02-17 NOTE — TELEPHONE ENCOUNTER
From: Enoc Rivera  To: Bethany Díaz.  Bryce Verde MD  Sent: 2/17/2020 12:35 PM CST  Subject: Other    for Margaret - I'm sorry I couldn't reply directly to your message - I talked to someone at Dr. Ranjeet Villafana on Thursday and was reassured to expect more than usual brui

## 2020-02-18 ENCOUNTER — OFFICE VISIT (OUTPATIENT)
Dept: INTERNAL MEDICINE CLINIC | Facility: CLINIC | Age: 85
End: 2020-02-18
Payer: MEDICARE

## 2020-02-18 VITALS
HEIGHT: 71 IN | BODY MASS INDEX: 23.8 KG/M2 | DIASTOLIC BLOOD PRESSURE: 73 MMHG | WEIGHT: 170 LBS | SYSTOLIC BLOOD PRESSURE: 160 MMHG | HEART RATE: 84 BPM

## 2020-02-18 DIAGNOSIS — E78.2 MIXED HYPERLIPIDEMIA: ICD-10-CM

## 2020-02-18 DIAGNOSIS — I10 ESSENTIAL HYPERTENSION WITH GOAL BLOOD PRESSURE LESS THAN 140/90: Primary | ICD-10-CM

## 2020-02-18 DIAGNOSIS — L98.9 SKIN LESION OF LEFT ARM: ICD-10-CM

## 2020-02-18 DIAGNOSIS — G89.29 CHRONIC LEFT-SIDED LOW BACK PAIN WITH LEFT-SIDED SCIATICA: ICD-10-CM

## 2020-02-18 DIAGNOSIS — C44.212 BASAL CELL CARCINOMA (BCC) OF HELIX OF RIGHT EAR: ICD-10-CM

## 2020-02-18 DIAGNOSIS — M54.42 CHRONIC LEFT-SIDED LOW BACK PAIN WITH LEFT-SIDED SCIATICA: ICD-10-CM

## 2020-02-18 PROBLEM — M79.622: Status: ACTIVE | Noted: 2020-02-18

## 2020-02-18 PROCEDURE — 99214 OFFICE O/P EST MOD 30 MIN: CPT | Performed by: INTERNAL MEDICINE

## 2020-02-18 PROCEDURE — G0463 HOSPITAL OUTPT CLINIC VISIT: HCPCS | Performed by: INTERNAL MEDICINE

## 2020-02-18 NOTE — PROGRESS NOTES
Angelo Caban is a 80year old male. HPI:     1. Essential hypertension with goal blood pressure less than 140/90    Patient presents for recheck of his hypertension.  Pt has been taking medications as instructed, no medication side effects, home BP mon 08/24/2018 79   08/28/2017 91   04/13/2017 98   04/10/2015 84   03/12/2012 94   12/23/2011 84     AST (U/L)   Date Value   03/29/2019 29   03/18/2019 28   12/10/2018 30   08/24/2018 39   08/28/2017 27   05/12/2015 41   04/10/2015 28   11/24/2014 31     A 3-4 tablets by mouth daily.           Past Medical History:   Diagnosis Date   • Atrial fibrillation ([facility] [address]    • BCC (basal cell carcinoma) 2019    right ear pinna   • Bilateral inguinal hernia     observation   • [facility]   • Cholecystiti lesions  HEENT: atraumatic, normocephalic, throat is clear  EARS: Right cerumen impaction  NECK: supple,no adenopathy,no bruits  LUNGS: clear to auscultation  CARDIO: RRR without murmur  GI: good BS's,no masses, HSM or tenderness  EXTREMITIES: no cyanosis, months

## 2020-02-20 ENCOUNTER — OFFICE VISIT (OUTPATIENT)
Dept: OTOLARYNGOLOGY | Facility: CLINIC | Age: 85
End: 2020-02-20
Payer: MEDICARE

## 2020-02-20 VITALS
HEIGHT: 71 IN | TEMPERATURE: 98 F | BODY MASS INDEX: 23.8 KG/M2 | WEIGHT: 170 LBS | DIASTOLIC BLOOD PRESSURE: 67 MMHG | HEART RATE: 64 BPM | SYSTOLIC BLOOD PRESSURE: 128 MMHG

## 2020-02-20 DIAGNOSIS — C44.202 CANCER OF SKIN OF AURICLE OF RIGHT EAR: Primary | ICD-10-CM

## 2020-02-20 DIAGNOSIS — C44.309: ICD-10-CM

## 2020-02-20 PROCEDURE — G0463 HOSPITAL OUTPT CLINIC VISIT: HCPCS | Performed by: OTOLARYNGOLOGY

## 2020-02-20 PROCEDURE — 99024 POSTOP FOLLOW-UP VISIT: CPT | Performed by: OTOLARYNGOLOGY

## 2020-02-20 RX ORDER — CEPHALEXIN 500 MG/1
500 CAPSULE ORAL EVERY 8 HOURS
Qty: 21 CAPSULE | Refills: 0 | Status: SHIPPED | OUTPATIENT
Start: 2020-02-20 | End: 2020-05-19

## 2020-02-20 NOTE — PROGRESS NOTES
Beena Vargas is a 80year old male.   Patient presents with:  Post-Op: Right ear cancer removal 2/12/20 with right ear skin graft      HISTORY OF PRESENT ILLNESS  He presents with a history of a lesion of the right ear and temple biopsied by his dermatolog Drug use: No    Other Topics      Concerns:        Caffeine Concern: Yes          coffee, 2 cups        Reaction to local anesthetic: No      Family History   Problem Relation Age of Onset   • Heart Attack Mother         CHF/CRF (cause of death)   • Diabet Psych Negative Anxiety and depression. Integumentary Negative Frequent skin infections, pigment change and rash. Hema/Lymph Negative Easy bleeding and easy bruising.            PHYSICAL EXAM    /67   Pulse 64   Temp 98 °F (36.7 °C) (Tympanic) DAILY, Disp: 90 capsule, Rfl: 1  •  OMEPRAZOLE 20 MG Oral Capsule Delayed Release, TAKE 1 CAPSULE BY MOUTH ONCE DAILY IN THE MORNING, Disp: 90 capsule, Rfl: 1  •  amLODIPine Besylate 2.5 MG Oral Tab, Take 2.5 mg by mouth daily.  Only take if BP is above 150

## 2020-02-24 ENCOUNTER — OFFICE VISIT (OUTPATIENT)
Dept: SURGERY | Facility: CLINIC | Age: 85
End: 2020-02-24
Payer: MEDICARE

## 2020-02-24 VITALS — BODY MASS INDEX: 24 KG/M2 | WEIGHT: 170 LBS

## 2020-02-24 DIAGNOSIS — C44.609 CANCER OF SKIN OF LEFT FOREARM: Primary | ICD-10-CM

## 2020-02-24 PROCEDURE — G0463 HOSPITAL OUTPT CLINIC VISIT: HCPCS | Performed by: SURGERY

## 2020-02-24 PROCEDURE — 99204 OFFICE O/P NEW MOD 45 MIN: CPT | Performed by: SURGERY

## 2020-02-25 NOTE — H&P
History and Physical      Daija Maurer is a 80year old male. HPI   Patient presents with:  Lump: Raised small lump on left forearm. Red, with a scab, very sensitive to touch per patient. Had for approximately 5 weeks.   Taking antibiotics, on secon CAPSULE BY MOUTH ONCE DAILY 90 capsule 1   • furosemide 20 MG Oral Tab Take 1 tablet (20 mg total) by mouth as needed.  30 tablet 0   • OMEPRAZOLE 20 MG Oral Capsule Delayed Release TAKE 1 CAPSULE BY MOUTH ONCE DAILY IN THE MORNING 90 capsule 1   • amLODIPi negatives noted in the the HPI. PHYSICAL EXAM   Wt 170 lb (77.1 kg)   BMI 23.71 kg/m²  No LMP for male patient.   Constitutional: appears well hydrated alert and responsive no acute distress noted  Head/Face: normocephalic  Nose/Mouth/Throat: nose and th

## 2020-03-05 ENCOUNTER — LAB REQUISITION (OUTPATIENT)
Dept: LAB | Facility: HOSPITAL | Age: 85
End: 2020-03-05
Payer: MEDICARE

## 2020-03-05 DIAGNOSIS — Z01.89 ENCOUNTER FOR OTHER SPECIFIED SPECIAL EXAMINATIONS: ICD-10-CM

## 2020-03-05 PROCEDURE — 88305 TISSUE EXAM BY PATHOLOGIST: CPT | Performed by: SURGERY

## 2020-03-05 RX ORDER — OMEPRAZOLE 20 MG/1
CAPSULE, DELAYED RELEASE ORAL
Qty: 90 CAPSULE | Refills: 0 | Status: SHIPPED | OUTPATIENT
Start: 2020-03-05 | End: 2020-05-26

## 2020-03-06 ENCOUNTER — TELEPHONE (OUTPATIENT)
Dept: SURGERY | Facility: CLINIC | Age: 85
End: 2020-03-06

## 2020-03-17 ENCOUNTER — TELEPHONE (OUTPATIENT)
Dept: SURGERY | Facility: CLINIC | Age: 85
End: 2020-03-17

## 2020-03-17 NOTE — TELEPHONE ENCOUNTER
Per pt's daughter, is wanting to know if there is another option as far as removing stitches. Appt is scheduled for 3/23/20. Possibly sending a home health nurse, does not want to expose father to anything. Pt has had help from residential home health.  Ple

## 2020-03-17 NOTE — TELEPHONE ENCOUNTER
Advised patient's daughter to call the PCP to advise about the suture removal.  They are keeping the 3/23/2020 appointment for now.

## 2020-03-18 DIAGNOSIS — Z48.02 VISIT FOR SUTURE REMOVAL: Primary | ICD-10-CM

## 2020-03-19 ENCOUNTER — TELEPHONE (OUTPATIENT)
Dept: SURGERY | Facility: CLINIC | Age: 85
End: 2020-03-19

## 2020-03-19 NOTE — TELEPHONE ENCOUNTER
Per patient's daughter, may change the time of appointment to 8am, will call in the am to let us know.

## 2020-03-23 ENCOUNTER — NURSE ONLY (OUTPATIENT)
Dept: SURGERY | Facility: CLINIC | Age: 85
End: 2020-03-23
Payer: MEDICARE

## 2020-03-23 VITALS — BODY MASS INDEX: 24 KG/M2 | WEIGHT: 170 LBS

## 2020-03-23 DIAGNOSIS — C44.92 KERATOACANTHOMA TYPE SQUAMOUS CELL CARCINOMA OF SKIN: Primary | ICD-10-CM

## 2020-03-23 PROCEDURE — 99024 POSTOP FOLLOW-UP VISIT: CPT | Performed by: SURGERY

## 2020-03-23 PROCEDURE — G0463 HOSPITAL OUTPT CLINIC VISIT: HCPCS | Performed by: SURGERY

## 2020-03-23 NOTE — PROGRESS NOTES
Postoperative Patient Follow-up      3/23/2020    Timothy Mead 80year old      HPI  Patient presents with:  Post-Op: S/P Excisional biopsy of left extensor mid-forearm skin lesion 3/5/2020. Site has no redness or swelling. Patient denies fevers.   State

## 2020-05-08 RX ORDER — APIXABAN 5 MG/1
TABLET, FILM COATED ORAL
Qty: 60 TABLET | Refills: 3 | Status: SHIPPED | OUTPATIENT
Start: 2020-05-08 | End: 2020-09-17

## 2020-05-11 ENCOUNTER — ADVANCED DIRECTIVES (OUTPATIENT)
Dept: CARDIOLOGY | Age: 85
End: 2020-05-11

## 2020-05-13 ENCOUNTER — OFFICE VISIT (OUTPATIENT)
Dept: SURGERY | Facility: CLINIC | Age: 85
End: 2020-05-13
Payer: MEDICARE

## 2020-05-13 VITALS — BODY MASS INDEX: 23.8 KG/M2 | WEIGHT: 170 LBS | HEIGHT: 71 IN

## 2020-05-13 DIAGNOSIS — C44.92 KERATOACANTHOMA TYPE SQUAMOUS CELL CARCINOMA OF SKIN: Primary | ICD-10-CM

## 2020-05-13 PROCEDURE — 99214 OFFICE O/P EST MOD 30 MIN: CPT | Performed by: SURGERY

## 2020-05-13 PROCEDURE — G0463 HOSPITAL OUTPT CLINIC VISIT: HCPCS | Performed by: SURGERY

## 2020-05-14 NOTE — H&P
History and Physical      Zechariah Urias is a 80year old male. HPI   Patient presents with: Follow - Up: Patient here for follow up Lipoma excision Left formearm. Mariajose Peck has surgery on 3-23-20 and suture removal on 3-23-20.   Patient now having swel Ointment Apply 1 Application topically 3 (three) times daily. 1 Tube 0   • TAMSULOSIN HCL 0.4 MG Oral Cap TAKE 1 CAPSULE BY MOUTH ONCE DAILY 90 capsule 1   • furosemide 20 MG Oral Tab Take 1 tablet (20 mg total) by mouth as needed.  30 tablet 0   • amLODIPi negatives noted in the the HPI. PHYSICAL EXAM   Ht 5' 11\" (1.803 m)   Wt 170 lb (77.1 kg)   BMI 23.71 kg/m²  No LMP for male patient.   Constitutional: appears well hydrated alert and responsive no acute distress noted  Head/Face: normocephalic  Nose/Mo

## 2020-05-15 DIAGNOSIS — Z01.812 PRE-PROCEDURE LAB EXAM: Primary | ICD-10-CM

## 2020-05-18 ENCOUNTER — LAB ENCOUNTER (OUTPATIENT)
Dept: LAB | Facility: HOSPITAL | Age: 85
End: 2020-05-18
Attending: EMERGENCY MEDICINE
Payer: MEDICARE

## 2020-05-18 DIAGNOSIS — Z01.812 PRE-PROCEDURE LAB EXAM: ICD-10-CM

## 2020-05-19 ENCOUNTER — PATIENT MESSAGE (OUTPATIENT)
Dept: INTERNAL MEDICINE CLINIC | Facility: CLINIC | Age: 85
End: 2020-05-19

## 2020-05-19 ENCOUNTER — TELEMEDICINE (OUTPATIENT)
Dept: INTERNAL MEDICINE CLINIC | Facility: CLINIC | Age: 85
End: 2020-05-19

## 2020-05-19 DIAGNOSIS — C44.212 BASAL CELL CARCINOMA (BCC) OF HELIX OF RIGHT EAR: ICD-10-CM

## 2020-05-19 DIAGNOSIS — M54.42 CHRONIC LEFT-SIDED LOW BACK PAIN WITH LEFT-SIDED SCIATICA: ICD-10-CM

## 2020-05-19 DIAGNOSIS — G89.29 CHRONIC LEFT-SIDED LOW BACK PAIN WITH LEFT-SIDED SCIATICA: ICD-10-CM

## 2020-05-19 DIAGNOSIS — I10 ESSENTIAL HYPERTENSION WITH GOAL BLOOD PRESSURE LESS THAN 140/90: Primary | ICD-10-CM

## 2020-05-19 DIAGNOSIS — E78.2 MIXED HYPERLIPIDEMIA: ICD-10-CM

## 2020-05-19 PROCEDURE — 99214 OFFICE O/P EST MOD 30 MIN: CPT | Performed by: INTERNAL MEDICINE

## 2020-05-19 RX ORDER — ALLOPURINOL 300 MG/1
300 TABLET ORAL DAILY
COMMUNITY
End: 2020-06-22

## 2020-05-19 NOTE — TELEPHONE ENCOUNTER
Arleth Chapa: can you please confirm if we can add medication back on active list?  I see it in history .

## 2020-05-19 NOTE — TELEPHONE ENCOUNTER
From: Silvina Pate  To: Sarah Honeycutt MD  Sent: 5/19/2020 1:39 PM CDT  Subject: Visit Follow-up Question    thank you for the video appointment today - I checked dad's medication chart which reflect the medications you removed - I noticed that the

## 2020-05-20 ENCOUNTER — TELEPHONE (OUTPATIENT)
Dept: SURGERY | Facility: CLINIC | Age: 85
End: 2020-05-20

## 2020-05-21 ENCOUNTER — LAB REQUISITION (OUTPATIENT)
Dept: LAB | Facility: HOSPITAL | Age: 85
End: 2020-05-21
Payer: MEDICARE

## 2020-05-21 DIAGNOSIS — Z01.89 ENCOUNTER FOR OTHER SPECIFIED SPECIAL EXAMINATIONS: ICD-10-CM

## 2020-05-21 PROCEDURE — 88305 TISSUE EXAM BY PATHOLOGIST: CPT | Performed by: SURGERY

## 2020-05-26 RX ORDER — OMEPRAZOLE 20 MG/1
CAPSULE, DELAYED RELEASE ORAL
Qty: 90 CAPSULE | Refills: 0 | Status: SHIPPED | OUTPATIENT
Start: 2020-05-26 | End: 2020-08-31

## 2020-06-03 ENCOUNTER — OFFICE VISIT (OUTPATIENT)
Dept: SURGERY | Facility: CLINIC | Age: 85
End: 2020-06-03
Payer: MEDICARE

## 2020-06-03 DIAGNOSIS — C44.629 SQUAMOUS CELL CANCER OF SKIN OF LEFT FOREARM: Primary | ICD-10-CM

## 2020-06-03 PROCEDURE — G0463 HOSPITAL OUTPT CLINIC VISIT: HCPCS | Performed by: SURGERY

## 2020-06-03 PROCEDURE — 99024 POSTOP FOLLOW-UP VISIT: CPT | Performed by: SURGERY

## 2020-06-03 NOTE — PROGRESS NOTES
Postoperative Patient Follow-up      6/3/2020    Erick Bull 80year old      HPI  Patient presents with:  Post-Op: first post-op visit, s/p excisional biopsy left forearm ulnar skin lesion and excisional biopsy left forearm extensor skin lesion on 5/21/

## 2020-06-16 ENCOUNTER — TELEPHONE (OUTPATIENT)
Dept: INTERNAL MEDICINE CLINIC | Facility: CLINIC | Age: 85
End: 2020-06-16

## 2020-06-16 ENCOUNTER — OFFICE VISIT (OUTPATIENT)
Dept: PODIATRY CLINIC | Facility: CLINIC | Age: 85
End: 2020-06-16
Payer: MEDICARE

## 2020-06-16 DIAGNOSIS — M79.674 PAIN IN TOES OF BOTH FEET: Primary | ICD-10-CM

## 2020-06-16 DIAGNOSIS — M79.675 PAIN IN TOES OF BOTH FEET: Primary | ICD-10-CM

## 2020-06-16 DIAGNOSIS — B35.1 ONYCHOMYCOSIS: ICD-10-CM

## 2020-06-16 PROCEDURE — 11721 DEBRIDE NAIL 6 OR MORE: CPT | Performed by: PODIATRIST

## 2020-06-16 NOTE — PROGRESS NOTES
HPI:    Patient ID: Elijah Miller is a 80year old male. This 49-year-old male presents with recurrent pain associated with his toenails. He reports relief by previous care.   The last time I saw him was early February of this year    ROS:     I did revie diagnosis)  Onychomycosis    No orders of the defined types were placed in this encounter.       Meds This Visit:  Requested Prescriptions      No prescriptions requested or ordered in this encounter       Imaging & Referrals:  None       #[phone]

## 2020-06-16 NOTE — TELEPHONE ENCOUNTER
Paged on call by Mrs Berton Buerger, daughter, anthony Trevizo Daughters: concern, fever 100.4f, weak, difficulty ambulating. Spoke with  Berton Buerger. Was at [facility] for oncychomycosis with podiatry earlier today, had minimal debridement, no open cuts per daughter.   Since g

## 2020-06-17 ENCOUNTER — TELEPHONE (OUTPATIENT)
Dept: INTERNAL MEDICINE CLINIC | Facility: CLINIC | Age: 85
End: 2020-06-17

## 2020-06-17 DIAGNOSIS — R50.9 FEVER, UNSPECIFIED FEVER CAUSE: Primary | ICD-10-CM

## 2020-06-17 PROCEDURE — 99441 PHONE E/M BY PHYS 5-10 MIN: CPT | Performed by: PHYSICIAN ASSISTANT

## 2020-06-17 NOTE — TELEPHONE ENCOUNTER
Virtual Telephone Check-In    Aylin Newman verbally consents to a Virtual/Telephone Check-In visit on 06/17/20. Patient has been referred to the [facility] website at [email or web address] to review the yearly Consent to Treat document.     Patient Jorge Harrison

## 2020-06-17 NOTE — TELEPHONE ENCOUNTER
RN pls call pt and triage, thanks.      Future Appointments   Date Time Provider Tri Christie   7/28/2020 11:30 AM Jude Torres MD [address] [facility]   8/18/2020 11:00 AM Faith Blunt MD [facility]   9/15/2020 11:25 AM Daniel Escobar

## 2020-06-17 NOTE — TELEPHONE ENCOUNTER
Daughter Pablo ROSADO calling back (verified patient's name and ), reported that as of this moment, patient is totally asymptomatic, no more fever, just little tired,better than before, no urinary issues.   Advised to check temperature twice a day and g

## 2020-06-17 NOTE — TELEPHONE ENCOUNTER
From: Enoc Rivera  To: Callie Miller MD  Sent: 6/17/2020 10:46 AM CDT  Subject: Other    Hello - Dad had a fever yesterday afternoon post nap-100.5-and felt weak walking to the bathroom, too. 4:30pm. Earlier we saw Dr. Miguel Angel Barakat for toenail trim whic

## 2020-06-18 ENCOUNTER — APPOINTMENT (OUTPATIENT)
Dept: LAB | Facility: HOSPITAL | Age: 85
End: 2020-06-18
Attending: PHYSICIAN ASSISTANT
Payer: MEDICARE

## 2020-06-18 PROCEDURE — 81003 URINALYSIS AUTO W/O SCOPE: CPT | Performed by: PHYSICIAN ASSISTANT

## 2020-06-20 ENCOUNTER — LAB ENCOUNTER (OUTPATIENT)
Dept: LAB | Facility: HOSPITAL | Age: 85
End: 2020-06-20
Attending: PHYSICIAN ASSISTANT
Payer: MEDICARE

## 2020-06-20 DIAGNOSIS — R50.9 FEVER, UNSPECIFIED FEVER CAUSE: ICD-10-CM

## 2020-06-20 LAB
ALBUMIN SERPL-MCNC: 3.1 G/DL
ALBUMIN/GLOB SERPL: 0.8 {RATIO}
ALP SERPL-CCNC: 171 U/L
ALT SERPL-CCNC: 38 UNITS/L
ANION GAP SERPL CALC-SCNC: 6 MMOL/L
AST SERPL-CCNC: 44 UNITS/L
BILIRUB SERPL-MCNC: 0.9 MG/DL
BUN SERPL-MCNC: 18 MG/DL
BUN/CREAT SERPL: 15.9
CALCIUM SERPL-MCNC: 8.9 MG/DL
CHLORIDE SERPL-SCNC: 105 MMOL/L
CO2 SERPL-SCNC: 29 MMOL/L
CREAT SERPL-MCNC: 1.13 MG/DL
GLOBULIN SER-MCNC: 4.1 G/DL
GLUCOSE SERPL-MCNC: 130 MG/DL
HCT VFR BLD CALC: 44.2 %
HGB BLD-MCNC: 14.5 G/DL
LENGTH OF FAST TIME PATIENT: NO H
MCH RBC QN AUTO: 32.6 PG
MCHC RBC AUTO-ENTMCNC: 32.8 G/DL
MCV RBC AUTO: 99.3 FL
PLATELET # BLD: 142 K/MCL
POTASSIUM SERPL-SCNC: 3.9 MMOL/L
PROT SERPL-MCNC: 7.2 G/DL
RBC # BLD: 4.45 10*6/UL
SODIUM SERPL-SCNC: 140 MMOL/L
WBC # BLD: 8 K/MCL

## 2020-06-20 PROCEDURE — 80053 COMPREHEN METABOLIC PANEL: CPT

## 2020-06-20 PROCEDURE — 36415 COLL VENOUS BLD VENIPUNCTURE: CPT

## 2020-06-20 PROCEDURE — 85025 COMPLETE CBC W/AUTO DIFF WBC: CPT

## 2020-06-22 RX ORDER — TAMSULOSIN HYDROCHLORIDE 0.4 MG/1
CAPSULE ORAL
Qty: 90 CAPSULE | Refills: 0 | Status: SHIPPED | OUTPATIENT
Start: 2020-06-22 | End: 2020-09-18

## 2020-06-22 RX ORDER — ALLOPURINOL 300 MG/1
TABLET ORAL
Qty: 90 TABLET | Refills: 0 | Status: SHIPPED | OUTPATIENT
Start: 2020-06-22 | End: 2020-09-18

## 2020-06-23 RX ORDER — LOVASTATIN 40 MG/1
40 TABLET ORAL NIGHTLY
Qty: 90 TABLET | Refills: 0 | Status: SHIPPED | OUTPATIENT
Start: 2020-06-23 | End: 2020-09-17

## 2020-06-30 ENCOUNTER — TELEMEDICINE (OUTPATIENT)
Dept: INTERNAL MEDICINE CLINIC | Facility: CLINIC | Age: 85
End: 2020-06-30
Payer: MEDICARE

## 2020-06-30 ENCOUNTER — PATIENT MESSAGE (OUTPATIENT)
Dept: INTERNAL MEDICINE CLINIC | Facility: CLINIC | Age: 85
End: 2020-06-30

## 2020-06-30 DIAGNOSIS — E78.2 MIXED HYPERLIPIDEMIA: ICD-10-CM

## 2020-06-30 DIAGNOSIS — I10 ESSENTIAL HYPERTENSION WITH GOAL BLOOD PRESSURE LESS THAN 140/90: Primary | ICD-10-CM

## 2020-06-30 DIAGNOSIS — M25.551 RIGHT HIP PAIN: ICD-10-CM

## 2020-06-30 DIAGNOSIS — R74.8 ELEVATED ALKALINE PHOSPHATASE LEVEL: ICD-10-CM

## 2020-06-30 PROCEDURE — 99214 OFFICE O/P EST MOD 30 MIN: CPT | Performed by: INTERNAL MEDICINE

## 2020-06-30 NOTE — PROGRESS NOTES
Patient ID: Mary Devine is a 80year old male. HISTORY OF PRESENT ILLNESS:   1.  Essential hypertension with goal blood pressure less than 140/90    Patient has been following low salt diet and has been taking anti-hypertensive prescriptions as presc Left 01/05/2000   • LAMINECTOMY  2000   • LAPAROSCOPY, SURGICAL; PARTIAL NEPHRECTOMY Right    • REPR CMPL WND HEAD,FAC,HAND 2.6-7.5 Right 02/12/2020   • REPR CMPL WND LID,NOS,EAR 2.5-7.5 Right 02/12/2020         Current Outpatient Medications:   •  ALLOPUR Quit date: 1987        Years since quittin.9      Smokeless tobacco: Never Used    Substance and Sexual Activity      Alcohol use: No        Comment: beer - 3 beers occasionally, NOT ANYMORE ACCORDING TO DAUGHTER      Drug use: No      Sexual acti prescribed and to follow a low salt diet as discussed. Regular exercise at least 3 times weekly will help to curb one's appetite, control weight and lead to better blood pressure control.  Record blood pressures at home and bring readings to your next offic Valeria  Telehealth Verbal Consent   I conducted a telehealth visit with Ayana Medrano today, 05/19/20, which was completed using two-way, real-time interactive audio and video communication.  This has been done in good ross to provide continuity of care i

## 2020-06-30 NOTE — TELEPHONE ENCOUNTER
From: Merrill Sierra  To: Christopher Kline MD  Sent: 6/30/2020 11:23 AM CDT  Subject: Test Results Question    Hi - Anything concerning in the lab results from June 20th?  Dad had a golf-ball size bulge in the right groin area on 6/24 that seemed to reso

## 2020-06-30 NOTE — TELEPHONE ENCOUNTER
Advised and scheduled a virtual visit today. Daughter will be present for appt. Reviewed instructions for the doximity call.

## 2020-07-01 PROBLEM — M25.551 RIGHT HIP PAIN: Status: ACTIVE | Noted: 2020-07-01

## 2020-07-07 ENCOUNTER — ANCILLARY PROCEDURE (OUTPATIENT)
Dept: CARDIOLOGY | Age: 85
End: 2020-07-07
Attending: INTERNAL MEDICINE

## 2020-07-07 DIAGNOSIS — I35.0 NONRHEUMATIC AORTIC VALVE STENOSIS: ICD-10-CM

## 2020-07-07 PROCEDURE — 93306 TTE W/DOPPLER COMPLETE: CPT | Performed by: INTERNAL MEDICINE

## 2020-07-09 ENCOUNTER — HOSPITAL ENCOUNTER (OUTPATIENT)
Dept: GENERAL RADIOLOGY | Facility: HOSPITAL | Age: 85
Discharge: HOME | End: 2020-07-09
Attending: INTERNAL MEDICINE
Payer: MEDICARE

## 2020-07-09 DIAGNOSIS — R74.8 ELEVATED ALKALINE PHOSPHATASE LEVEL: ICD-10-CM

## 2020-07-09 PROCEDURE — 73522 X-RAY EXAM HIPS BI 3-4 VIEWS: CPT | Performed by: INTERNAL MEDICINE

## 2020-07-09 PROCEDURE — 72110 X-RAY EXAM L-2 SPINE 4/>VWS: CPT | Performed by: INTERNAL MEDICINE

## 2020-07-13 ENCOUNTER — MED REC SCAN ONLY (OUTPATIENT)
Dept: INTERNAL MEDICINE CLINIC | Facility: CLINIC | Age: 85
End: 2020-07-13

## 2020-07-15 ENCOUNTER — E-ADVICE (OUTPATIENT)
Dept: CARDIOLOGY | Age: 85
End: 2020-07-15

## 2020-07-15 NOTE — TELEPHONE ENCOUNTER
Ok to refill flonase, 2 sprays each nostril daily, one bottle with 6 refills Dapsone Pregnancy And Lactation Text: This medication is Pregnancy Category C and is not considered safe during pregnancy or breast feeding.

## 2020-07-16 ENCOUNTER — OFFICE VISIT (OUTPATIENT)
Dept: PODIATRY CLINIC | Facility: CLINIC | Age: 85
End: 2020-07-16
Payer: MEDICARE

## 2020-07-16 DIAGNOSIS — M79.671 RIGHT FOOT PAIN: Primary | ICD-10-CM

## 2020-07-16 DIAGNOSIS — L84 FOOT CALLUS: ICD-10-CM

## 2020-07-16 PROCEDURE — 99212 OFFICE O/P EST SF 10 MIN: CPT | Performed by: PODIATRIST

## 2020-07-16 NOTE — PROGRESS NOTES
HPI:    Patient ID: Elijah Miller is a 80year old male. This 63-year-old male presents to the office with a new complaint. He is having pain with the right foot that is been present for a while.   He is accompanied by his daughter and she is not exactly demonstrates no wart tissue. It appears to strictly and only be a callus based on a prominence of the fifth metatarsal.  Clearly this patient's fat pad cushion is gone and therefore it is my sense that chronic pressure has created this lesion.   Upon debri

## 2020-07-21 ENCOUNTER — OFFICE VISIT (OUTPATIENT)
Dept: CARDIOLOGY | Age: 85
End: 2020-07-21

## 2020-07-21 VITALS
RESPIRATION RATE: 18 BRPM | DIASTOLIC BLOOD PRESSURE: 70 MMHG | WEIGHT: 167 LBS | SYSTOLIC BLOOD PRESSURE: 122 MMHG | BODY MASS INDEX: 23.29 KG/M2 | OXYGEN SATURATION: 99 % | HEART RATE: 65 BPM

## 2020-07-21 DIAGNOSIS — I35.0 NONRHEUMATIC AORTIC VALVE STENOSIS: Primary | ICD-10-CM

## 2020-07-21 DIAGNOSIS — I25.10 CORONARY ARTERY DISEASE INVOLVING NATIVE CORONARY ARTERY OF NATIVE HEART WITHOUT ANGINA PECTORIS: ICD-10-CM

## 2020-07-21 PROCEDURE — 99214 OFFICE O/P EST MOD 30 MIN: CPT | Performed by: INTERNAL MEDICINE

## 2020-07-21 ASSESSMENT — PATIENT HEALTH QUESTIONNAIRE - PHQ9
SUM OF ALL RESPONSES TO PHQ9 QUESTIONS 1 AND 2: 0
CLINICAL INTERPRETATION OF PHQ9 SCORE: NO FURTHER SCREENING NEEDED
SUM OF ALL RESPONSES TO PHQ9 QUESTIONS 1 AND 2: 0
CLINICAL INTERPRETATION OF PHQ2 SCORE: NO FURTHER SCREENING NEEDED
2. FEELING DOWN, DEPRESSED OR HOPELESS: NOT AT ALL
1. LITTLE INTEREST OR PLEASURE IN DOING THINGS: NOT AT ALL

## 2020-07-28 ENCOUNTER — OFFICE VISIT (OUTPATIENT)
Dept: OTOLARYNGOLOGY | Facility: CLINIC | Age: 85
End: 2020-07-28
Payer: MEDICARE

## 2020-07-28 VITALS
WEIGHT: 165 LBS | DIASTOLIC BLOOD PRESSURE: 66 MMHG | HEART RATE: 59 BPM | SYSTOLIC BLOOD PRESSURE: 135 MMHG | HEIGHT: 71 IN | BODY MASS INDEX: 23.1 KG/M2

## 2020-07-28 DIAGNOSIS — C44.309: ICD-10-CM

## 2020-07-28 DIAGNOSIS — C44.202 CANCER OF SKIN OF AURICLE OF RIGHT EAR: Primary | ICD-10-CM

## 2020-07-28 PROCEDURE — 99213 OFFICE O/P EST LOW 20 MIN: CPT | Performed by: OTOLARYNGOLOGY

## 2020-07-28 PROCEDURE — G0463 HOSPITAL OUTPT CLINIC VISIT: HCPCS | Performed by: OTOLARYNGOLOGY

## 2020-07-28 NOTE — PROGRESS NOTES
Sherryle Clark is a 80year old male.   Patient presents with:  Ear Problem: f/u cancer of auricle surgery 2/12/20      HISTORY OF PRESENT ILLNESS  He presents with a history of a lesion of the right ear and temple biopsied by his dermatologist demonstrating Occupation: Director/manager [employer]        Comment: Jj, retired    Tobacco Use      Smoking status: Former Smoker        Packs/day: 0.75        Years: 30.00        Pack years: 22.5        Quit date: 8/4/1987        Years since quitting: 2.5-7.5 Right 02/12/2020         REVIEW OF SYSTEMS    System Neg/Pos Details   Constitutional Negative Fatigue, fever and weight loss. ENMT Negative Drooling. Eyes Negative Blurred vision and vision changes. Respiratory Negative Dyspnea and wheezing. Normal. Oropharynx - Normal.   Nose/Mouth/Throat Normal Nares - Right: Normal Left: Normal. Septum -Normal  Turbinates - Right: Normal, Left: Normal.       Current Outpatient Medications:   •  ALLOPURINOL 300 MG Oral Tab, Take 1 tablet by mouth once daily,

## 2020-08-04 ENCOUNTER — APPOINTMENT (OUTPATIENT)
Dept: CARDIOLOGY | Age: 85
End: 2020-08-04
Attending: INTERNAL MEDICINE

## 2020-08-18 ENCOUNTER — OFFICE VISIT (OUTPATIENT)
Dept: INTERNAL MEDICINE CLINIC | Facility: CLINIC | Age: 85
End: 2020-08-18
Payer: MEDICARE

## 2020-08-18 VITALS
SYSTOLIC BLOOD PRESSURE: 148 MMHG | WEIGHT: 169 LBS | HEART RATE: 90 BPM | RESPIRATION RATE: 16 BRPM | BODY MASS INDEX: 23.66 KG/M2 | DIASTOLIC BLOOD PRESSURE: 73 MMHG | HEIGHT: 71 IN

## 2020-08-18 DIAGNOSIS — E78.2 MIXED HYPERLIPIDEMIA: ICD-10-CM

## 2020-08-18 DIAGNOSIS — H91.93 BILATERAL HEARING LOSS, UNSPECIFIED HEARING LOSS TYPE: ICD-10-CM

## 2020-08-18 DIAGNOSIS — R74.8 ELEVATED ALKALINE PHOSPHATASE LEVEL: ICD-10-CM

## 2020-08-18 DIAGNOSIS — I10 ESSENTIAL HYPERTENSION WITH GOAL BLOOD PRESSURE LESS THAN 140/90: Primary | ICD-10-CM

## 2020-08-18 DIAGNOSIS — M25.551 RIGHT HIP PAIN: ICD-10-CM

## 2020-08-18 PROCEDURE — G0463 HOSPITAL OUTPT CLINIC VISIT: HCPCS | Performed by: INTERNAL MEDICINE

## 2020-08-18 PROCEDURE — 99214 OFFICE O/P EST MOD 30 MIN: CPT | Performed by: INTERNAL MEDICINE

## 2020-08-18 NOTE — PROGRESS NOTES
Patient ID: Enoc Rivera is a 80year old male. HISTORY OF PRESENT ILLNESS:   1.  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 Patient appears alert.   No conversational dyspn

## 2020-08-31 ENCOUNTER — MED REC SCAN ONLY (OUTPATIENT)
Dept: INTERNAL MEDICINE CLINIC | Facility: CLINIC | Age: 85
End: 2020-08-31

## 2020-08-31 RX ORDER — OMEPRAZOLE 20 MG/1
CAPSULE, DELAYED RELEASE ORAL
Qty: 90 CAPSULE | Refills: 0 | Status: SHIPPED | OUTPATIENT
Start: 2020-08-31 | End: 2020-12-07

## 2020-09-15 ENCOUNTER — OFFICE VISIT (OUTPATIENT)
Dept: PODIATRY CLINIC | Facility: CLINIC | Age: 85
End: 2020-09-15
Payer: MEDICARE

## 2020-09-15 DIAGNOSIS — M79.675 PAIN IN TOES OF BOTH FEET: Primary | ICD-10-CM

## 2020-09-15 DIAGNOSIS — M79.674 PAIN IN TOES OF BOTH FEET: Primary | ICD-10-CM

## 2020-09-15 DIAGNOSIS — B35.1 ONYCHOMYCOSIS: ICD-10-CM

## 2020-09-15 PROCEDURE — 11721 DEBRIDE NAIL 6 OR MORE: CPT | Performed by: PODIATRIST

## 2020-09-15 NOTE — PROGRESS NOTES
HPI:    Patient ID: Niels Thompson is a 80year old male. 79-year-old male presents to the office today with recurrent pain associated primarily with his toenails. The last time I saw this patient was July 16. He reports relief by previous care.       JEREL symptoms redevelop         ASSESSMENT/PLAN:   Pain in toes of both feet  (primary encounter diagnosis)  Onychomycosis    No orders of the defined types were placed in this encounter.       Meds This Visit:  Requested Prescriptions      No prescriptions requ

## 2020-09-17 DIAGNOSIS — E78.00 HYPERCHOLESTEROLEMIA: Primary | ICD-10-CM

## 2020-09-17 RX ORDER — LOVASTATIN 40 MG/1
40 TABLET ORAL NIGHTLY
Qty: 90 TABLET | Refills: 0 | Status: SHIPPED | OUTPATIENT
Start: 2020-09-17 | End: 2020-12-17 | Stop reason: SDUPTHER

## 2020-09-17 RX ORDER — APIXABAN 5 MG/1
TABLET, FILM COATED ORAL
Qty: 180 TABLET | Refills: 1 | Status: SHIPPED | OUTPATIENT
Start: 2020-09-17 | End: 2021-03-18 | Stop reason: SDUPTHER

## 2020-09-18 RX ORDER — TAMSULOSIN HYDROCHLORIDE 0.4 MG/1
CAPSULE ORAL
Qty: 90 CAPSULE | Refills: 0 | Status: SHIPPED | OUTPATIENT
Start: 2020-09-18 | End: 2020-11-17

## 2020-09-18 RX ORDER — ALLOPURINOL 300 MG/1
TABLET ORAL
Qty: 90 TABLET | Refills: 0 | Status: SHIPPED | OUTPATIENT
Start: 2020-09-18 | End: 2020-12-14

## 2020-09-28 ENCOUNTER — PATIENT MESSAGE (OUTPATIENT)
Dept: INTERNAL MEDICINE CLINIC | Facility: CLINIC | Age: 85
End: 2020-09-28

## 2020-09-28 NOTE — TELEPHONE ENCOUNTER
From: Idania Garza  To: Jabier Rainey MD  Sent: 9/28/2020 3:25 PM CDT  Subject: Other    Hello! Happy Fall. Had dad for his flu shot over at his old stomping grounds (the [place]  Here is a copy of what he received that day - to upd

## 2020-10-27 ENCOUNTER — LAB ENCOUNTER (OUTPATIENT)
Dept: LAB | Facility: HOSPITAL | Age: 85
End: 2020-10-27
Attending: INTERNAL MEDICINE
Payer: MEDICARE

## 2020-10-27 ENCOUNTER — PATIENT MESSAGE (OUTPATIENT)
Dept: INTERNAL MEDICINE CLINIC | Facility: CLINIC | Age: 85
End: 2020-10-27

## 2020-10-27 ENCOUNTER — E-ADVICE (OUTPATIENT)
Dept: CARDIOLOGY | Age: 85
End: 2020-10-27

## 2020-10-27 DIAGNOSIS — E78.00 PURE HYPERCHOLESTEROLEMIA: Primary | ICD-10-CM

## 2020-10-27 LAB
CHOLEST SERPL-MCNC: 137 MG/DL
CK SERPL-CCNC: 44 U/L
HDLC SERPL-MCNC: 54 MG/DL
LDLC SERPL CALC-MCNC: 70 MG/DL
TRIGL SERPL-MCNC: 67 MG/DL

## 2020-10-27 PROCEDURE — 36415 COLL VENOUS BLD VENIPUNCTURE: CPT

## 2020-10-27 PROCEDURE — 80061 LIPID PANEL: CPT

## 2020-10-27 PROCEDURE — 82550 ASSAY OF CK (CPK): CPT

## 2020-10-27 NOTE — TELEPHONE ENCOUNTER
From: Cathy Moreau  To: Jorge Duncan MD  Sent: 10/27/2020 12:03 PM CDT  Subject: Test Results Question    Today we went in for blood test you for dad prior to next appt.  in Nov. I also brought along the paper orders for two tests requested by

## 2020-10-29 ENCOUNTER — TELEPHONE (OUTPATIENT)
Dept: INTERNAL MEDICINE CLINIC | Facility: CLINIC | Age: 85
End: 2020-10-29

## 2020-10-29 ENCOUNTER — TELEPHONE (OUTPATIENT)
Dept: CARDIOLOGY | Age: 85
End: 2020-10-29

## 2020-10-29 ENCOUNTER — APPOINTMENT (OUTPATIENT)
Dept: GENERAL RADIOLOGY | Facility: HOSPITAL | Age: 85
DRG: 444 | End: 2020-10-29
Attending: EMERGENCY MEDICINE
Payer: MEDICARE

## 2020-10-29 ENCOUNTER — APPOINTMENT (OUTPATIENT)
Dept: CT IMAGING | Facility: HOSPITAL | Age: 85
DRG: 444 | End: 2020-10-29
Attending: EMERGENCY MEDICINE
Payer: MEDICARE

## 2020-10-29 ENCOUNTER — APPOINTMENT (OUTPATIENT)
Dept: CT IMAGING | Facility: HOSPITAL | Age: 85
DRG: 444 | End: 2020-10-29
Payer: MEDICARE

## 2020-10-29 ENCOUNTER — HOSPITAL ENCOUNTER (EMERGENCY)
Facility: HOSPITAL | Age: 85
Discharge: HOME | DRG: 444 | End: 2020-10-29
Attending: EMERGENCY MEDICINE
Payer: MEDICARE

## 2020-10-29 VITALS
SYSTOLIC BLOOD PRESSURE: 113 MMHG | WEIGHT: 185 LBS | HEART RATE: 65 BPM | RESPIRATION RATE: 21 BRPM | OXYGEN SATURATION: 97 % | BODY MASS INDEX: 25.06 KG/M2 | TEMPERATURE: 100 F | HEIGHT: 72 IN | DIASTOLIC BLOOD PRESSURE: 61 MMHG

## 2020-10-29 DIAGNOSIS — R53.1 WEAKNESS GENERALIZED: ICD-10-CM

## 2020-10-29 DIAGNOSIS — E78.00 HYPERCHOLESTEROLEMIA: ICD-10-CM
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
## 2020-10-29 LAB
ANION GAP SERPL CALC-SCNC: 7 MMOL/L
BUN SERPL-MCNC: 19 MG/DL
BUN/CREAT SERPL: 15.4
CALCIUM SERPL-MCNC: 9.3 MG/DL
CHLORIDE SERPL-SCNC: 108 MMOL/L
CO2 SERPL-SCNC: 23 MMOL/L
CREAT SERPL-MCNC: 1.23 MG/DL
ERYTHROCYTE [DISTWIDTH] IN BLOOD BY AUTOMATED COUNT: 51.3 %
ERYTHROCYTE [DISTWIDTH] IN BLOOD BY AUTOMATED COUNT: 51.3 %
ERYTHROCYTE [DISTWIDTH] IN BLOOD: 14.2 %
ERYTHROCYTE [DISTWIDTH] IN BLOOD: 14.2 %
GLUCOSE SERPL-MCNC: 130 MG/DL
HCT VFR BLD CALC: 44.2 %
HCT VFR BLD CALC: 44.2 %
HGB BLD-MCNC: 14.9 G/DL
HGB BLD-MCNC: 14.9 G/DL
MCH RBC QN AUTO: 32.9 PG
MCH RBC QN AUTO: 32.9 PG
MCHC RBC AUTO-ENTMCNC: 33.7 G/DL
MCHC RBC AUTO-ENTMCNC: 33.7 G/DL
MCV RBC AUTO: 97.6 FL
MCV RBC AUTO: 97.6 FL
PLATELET # BLD: 91 K/MCL
PLATELET # BLD: 91 K/MCL
POTASSIUM SERPL-SCNC: 4.1 MMOL/L
RBC # BLD: 4.53 10*6/UL
RBC # BLD: 4.53 10*6/UL
SODIUM SERPL-SCNC: 138 MMOL/L
WBC # BLD: 11.8 K/MCL
WBC # BLD: 11.8 K/MCL

## 2020-10-29 PROCEDURE — 70450 CT HEAD/BRAIN W/O DYE: CPT | Performed by: EMERGENCY MEDICINE

## 2020-10-29 PROCEDURE — 85025 COMPLETE CBC W/AUTO DIFF WBC: CPT | Performed by: EMERGENCY MEDICINE

## 2020-10-29 PROCEDURE — 72125 CT NECK SPINE W/O DYE: CPT

## 2020-10-29 PROCEDURE — 71045 X-RAY EXAM CHEST 1 VIEW: CPT | Performed by: EMERGENCY MEDICINE

## 2020-10-29 PROCEDURE — 93005 ELECTROCARDIOGRAM TRACING: CPT

## 2020-10-29 PROCEDURE — 36415 COLL VENOUS BLD VENIPUNCTURE: CPT

## 2020-10-29 PROCEDURE — 99284 EMERGENCY DEPT VISIT MOD MDM: CPT

## 2020-10-29 PROCEDURE — 81001 URINALYSIS AUTO W/SCOPE: CPT | Performed by: EMERGENCY MEDICINE

## 2020-10-29 PROCEDURE — 80048 BASIC METABOLIC PNL TOTAL CA: CPT | Performed by: EMERGENCY MEDICINE

## 2020-10-29 PROCEDURE — 93010 ELECTROCARDIOGRAM REPORT: CPT | Performed by: EMERGENCY MEDICINE

## 2020-10-29 NOTE — ED INITIAL ASSESSMENT (HPI)
Pt arrives by EMS after an unwitnessed fall at home. Pt with Hx of dementia unable to say what happened and how the fall occurred. Unknown LOC. Pt on Eliquis. EMS also states that pt's family reported low grade fever and weakness.

## 2020-10-29 NOTE — ED PROVIDER NOTES
Patient Seen in: [facility] Emergency Department      History   Patient presents with:  Fall    Stated Complaint: fall    HPI obtained from patient and daughter due to dementia    The patient is a 80-year-old male with a history of dementia, hyper Right 2020                    Social History    Tobacco Use      Smoking status: Former Smoker        Packs/day: 0.75        Years: 30.00        Pack years: 22.5        Quit date: 1987        Years since quittin.2      Smokeless tobacco: Nev abdominal tenderness. There is no guarding or rebound. Musculoskeletal: Normal range of motion. General: No signs of injury. Right lower leg: No edema. Left lower leg: No edema. Skin:     General: Skin is warm and dry.       Capillary -----------         ------                     CBC W/ DIFFERENTIAL[[id number]]          Abnormal            Final result                 Please view results for these tests on the individual orders.    SCAN SLIDE   RAINBOW DRAW BLUE 10/29/2020  CONCLUSION:   Trace left pleural effusion. No focal opacity in the lungs or evidence of pneumothorax.     Dictated by (CST): Varinder Berg MD on 10/29/2020 at 10:35 AM     Finalized by (CST): Varinder Berg MD on 10/29/2020 at 10:37 AM

## 2020-10-29 NOTE — ED NOTES
Pt wheeled to exit in the wheelchair by this RN. Pt and daughter verbalize understanding of discharge instructions.

## 2020-10-29 NOTE — TELEPHONE ENCOUNTER
Pt's daughter, Jaquan Castorena (MICKEY in place) states pt is being taken to [address] [place] [address] ER by paramedics now. Jaquan Castorena states pt's other daughter found pt on the bathroom floor this morning and he reported he had no feeling in his legs, couldn't get up.

## 2020-10-30 ENCOUNTER — OFFICE VISIT (OUTPATIENT)
Dept: CARDIOLOGY | Age: 85
End: 2020-10-30

## 2020-10-30 ENCOUNTER — TELEPHONE (OUTPATIENT)
Dept: INTERNAL MEDICINE CLINIC | Facility: CLINIC | Age: 85
End: 2020-10-30

## 2020-10-30 VITALS
DIASTOLIC BLOOD PRESSURE: 54 MMHG | OXYGEN SATURATION: 97 % | HEART RATE: 74 BPM | BODY MASS INDEX: 23.94 KG/M2 | HEIGHT: 71 IN | SYSTOLIC BLOOD PRESSURE: 110 MMHG | WEIGHT: 171 LBS

## 2020-10-30 DIAGNOSIS — R55 SYNCOPE AND COLLAPSE: Primary | ICD-10-CM

## 2020-10-30 PROCEDURE — 99214 OFFICE O/P EST MOD 30 MIN: CPT | Performed by: NURSE PRACTITIONER

## 2020-10-31 ENCOUNTER — HOSPITAL ENCOUNTER (INPATIENT)
Facility: HOSPITAL | Age: 85
LOS: 6 days | Discharge: HOME HEALTH | DRG: 444 | End: 2020-11-06
Attending: EMERGENCY MEDICINE | Admitting: HOSPITALIST
Payer: MEDICARE

## 2020-10-31 ENCOUNTER — APPOINTMENT (OUTPATIENT)
Dept: GENERAL RADIOLOGY | Facility: HOSPITAL | Age: 85
DRG: 444 | End: 2020-10-31
Attending: EMERGENCY MEDICINE
Payer: MEDICARE

## 2020-10-31 ENCOUNTER — APPOINTMENT (OUTPATIENT)
Dept: CT IMAGING | Facility: HOSPITAL | Age: 85
DRG: 444 | End: 2020-10-31
Attending: EMERGENCY MEDICINE
Payer: MEDICARE

## 2020-10-31 DIAGNOSIS — R53.83 FATIGUE, UNSPECIFIED TYPE: Primary | ICD-10-CM

## 2020-10-31 DIAGNOSIS — R79.89 ELEVATED LACTIC ACID LEVEL: ICD-10-CM

## 2020-10-31 DIAGNOSIS — R50.9 FEVER, UNSPECIFIED FEVER CAUSE: ICD-10-CM

## 2020-10-31 DIAGNOSIS — E86.0 DEHYDRATION: ICD-10-CM

## 2020-10-31 PROCEDURE — 99223 1ST HOSP IP/OBS HIGH 75: CPT | Performed by: HOSPITALIST

## 2020-10-31 PROCEDURE — 71045 X-RAY EXAM CHEST 1 VIEW: CPT | Performed by: EMERGENCY MEDICINE

## 2020-10-31 PROCEDURE — 74177 CT ABD & PELVIS W/CONTRAST: CPT | Performed by: EMERGENCY MEDICINE

## 2020-10-31 RX ORDER — TAMSULOSIN HYDROCHLORIDE 0.4 MG/1
0.4 CAPSULE ORAL DAILY
Status: DISCONTINUED | OUTPATIENT
Start: 2020-10-31 | End: 2020-11-01

## 2020-10-31 RX ORDER — SODIUM CHLORIDE 0.9 % (FLUSH) 0.9 %
3 SYRINGE (ML) INJECTION AS NEEDED
Status: DISCONTINUED | OUTPATIENT
Start: 2020-10-31 | End: 2020-11-06

## 2020-10-31 RX ORDER — MORPHINE SULFATE 2 MG/ML
1 INJECTION, SOLUTION INTRAMUSCULAR; INTRAVENOUS EVERY 2 HOUR PRN
Status: DISCONTINUED | OUTPATIENT
Start: 2020-10-31 | End: 2020-11-06

## 2020-10-31 RX ORDER — AMLODIPINE BESYLATE 2.5 MG/1
2.5 TABLET ORAL DAILY
Status: DISCONTINUED | OUTPATIENT
Start: 2020-10-31 | End: 2020-11-06

## 2020-10-31 RX ORDER — PRAVASTATIN SODIUM 20 MG
40 TABLET ORAL NIGHTLY
Status: DISCONTINUED | OUTPATIENT
Start: 2020-10-31 | End: 2020-11-06

## 2020-10-31 RX ORDER — LOVASTATIN 40 MG/1
40 TABLET ORAL NIGHTLY
Status: DISCONTINUED | OUTPATIENT
Start: 2020-10-31 | End: 2020-10-31 | Stop reason: ALTCHOICE

## 2020-10-31 RX ORDER — ONDANSETRON 2 MG/ML
4 INJECTION INTRAMUSCULAR; INTRAVENOUS EVERY 6 HOURS PRN
Status: DISCONTINUED | OUTPATIENT
Start: 2020-10-31 | End: 2020-11-06

## 2020-10-31 RX ORDER — SODIUM CHLORIDE 9 MG/ML
INJECTION, SOLUTION INTRAVENOUS CONTINUOUS
Status: DISCONTINUED | OUTPATIENT
Start: 2020-10-31 | End: 2020-11-01

## 2020-10-31 RX ORDER — ACETAMINOPHEN 325 MG/1
650 TABLET ORAL EVERY 6 HOURS PRN
Status: DISCONTINUED | OUTPATIENT
Start: 2020-10-31 | End: 2020-11-06

## 2020-10-31 RX ORDER — ACETAMINOPHEN 500 MG
1000 TABLET ORAL ONCE
Status: COMPLETED | OUTPATIENT
Start: 2020-10-31 | End: 2020-10-31

## 2020-10-31 RX ORDER — ALLOPURINOL 300 MG/1
300 TABLET ORAL DAILY
Status: DISCONTINUED | OUTPATIENT
Start: 2020-10-31 | End: 2020-11-06

## 2020-10-31 RX ORDER — MORPHINE SULFATE 4 MG/ML
4 INJECTION, SOLUTION INTRAMUSCULAR; INTRAVENOUS EVERY 2 HOUR PRN
Status: DISCONTINUED | OUTPATIENT
Start: 2020-10-31 | End: 2020-11-06

## 2020-10-31 RX ORDER — SODIUM CHLORIDE 9 MG/ML
INJECTION, SOLUTION INTRAVENOUS ONCE
Status: COMPLETED | OUTPATIENT
Start: 2020-10-31 | End: 2020-10-31

## 2020-10-31 RX ORDER — FUROSEMIDE 20 MG/1
20 TABLET ORAL DAILY
Status: DISCONTINUED | OUTPATIENT
Start: 2020-10-31 | End: 2020-11-01

## 2020-10-31 RX ORDER — MORPHINE SULFATE 2 MG/ML
2 INJECTION, SOLUTION INTRAMUSCULAR; INTRAVENOUS EVERY 2 HOUR PRN
Status: DISCONTINUED | OUTPATIENT
Start: 2020-10-31 | End: 2020-11-06

## 2020-10-31 NOTE — TELEPHONE ENCOUNTER
Patient was seen in the ER on 10/29/2020 for weakness and low blood pressure. Patient saw Dr Mamadou Kaye yesterday and was advised to do a holter monitor because of the Atrial fibrillation. They were not able to get the holter monitor yet.  Patient is still very

## 2020-10-31 NOTE — TELEPHONE ENCOUNTER
Valeria sent. See acute telephone encounter 10/30/20.        Future Appointments   Date Time Provider Tri Soriano   11/17/2020 11:00 AM Timothy Franklin MD [facility]   12/15/2020 11:00 AM Micha Escobar DPM [facility]

## 2020-10-31 NOTE — ED PROVIDER NOTES
Patient Seen in: [facility] AND [facility] Emergency Department    History   Patient presents with:  Fatigue  Fever    Stated Complaint: weakness, hypotn, fever    HPI    Patient is here with his daughter again.   They were here 2 days ago and had extensive work- status: Former Smoker        Years: 40.00      Smokeless tobacco: Never Used      Tobacco comment: quit in [address] [place] [address] per daughter, Otoniel Neville), 1 cigarette a day    Alcohol use: No      Comment: beer - 3 beers occasionally, NOT ANYMORE ACCORDING TO DAUGHTER    Drug Current:/59 (BP Location: Left arm)   Pulse 68   Temp 97.6 °F (36.4 °C) (Oral)   Resp 18   Ht 180.3 cm (5' 11\")   Wt 78.3 kg   SpO2 95%   BMI 24.07 kg/m²         Physical Exam  Constitutional:  Alert, well nourished adult lying in bed in no di Result Value    Glucose 134 (*)     BUN 26 (*)     Creatinine 1.33 (*)     GFR, Non- 45 (*)     GFR, -American 53 (*)     All other components within normal limits   URINALYSIS WITH CULTURE REFLEX - Abnormal; Notable for the followin controlled response right bundle branch block noted no change compared to periods              MDM     98% Normal  Pulse oximetry    Cardiac Monitor: Atrial fibrillation    Disposition and Plan     We recommend that you schedule follow up care with a prima

## 2020-10-31 NOTE — ED INITIAL ASSESSMENT (HPI)
Patient here with fevers, last night was 101 that was brought down with tylenol. Patient family at bedside, states BP at home was 653 systolic which is low for him. Patient has been also having generalized weakness. Denies chest pain or dizziness.

## 2020-10-31 NOTE — TELEPHONE ENCOUNTER
Left message to pt to call back.          Future Appointments   Date Time Provider Tri Soriano   11/17/2020 11:00 AM Erica Banks MD [facility]   12/15/2020 11:00 AM Ale Escobar DPM [facility]

## 2020-10-31 NOTE — ED NOTES
Orders for admission, patient is aware of plan and ready to go upstairs. Any questions, please call ED JAMIA Nye  at extension [phone].    Type of COVID test sent: Rapid, negative  COVID Suspicion level: Low    Titratable drug(s) infusing: IVF  Rate: 999 mls/h

## 2020-10-31 NOTE — TELEPHONE ENCOUNTER
Advised daughter Coates Kelsy of Dr. Nolen Essex note.  Daughter verbalized understanding and will be taking patient to [address] [place] [address].

## 2020-10-31 NOTE — TELEPHONE ENCOUNTER
Valeria message sent. Triage RN=call and triage.     Regarding: RE: Test Results Question  Contact: [phone]  ----- Message from Daniel Win RN sent at 10/30/2020  8:32 PM CDT -----       ----- Message sent from DOMO Villatoro today.  Thanks ,  Nurse Perla Blanco RN      ----- Message -----       From:Johnny Hoffmann       Sent:10/27/2020 12:03 PM CDT         To:Lauri Shore MD    Subject:Test Results Question    Today we went in for blood test you for dad prior to next appt.  i

## 2020-10-31 NOTE — CONSULTS
[facility]  TEL: [phone]  FAX: [phone]    Cathy Moreau Patient Status:  Emergency    1926 MRN [id number]   Location [address] Attending Madisyn Wiggins MD   [facility] Day # 0 (cause of death)   • Diabetes Mother    • Hypertension Mother    • Heart Disease Sister         CAD (cause of death)   • Heart Attack Brother         CHF (cause of death)   • Cancer Brother 67        stomach    • Lipids Other         family h/o hyperlipide (77726)    Result Date: 10/29/2020  CONCLUSION:  Severe chronic microvascular ischemic disease without acute intracranial abnormality.    Dictated by (CST): Tam Pulido MD on 10/29/2020 at 10:04 AM     Finalized by (CST): Tam Pulido MD on 10/29/2020 at 1 Finalized by (CST): Magda Garcia MD on 10/31/2020 at 5:47 PM            Imaging results reviewed     Impression:  Patient Active Problem List:     Acute cholecystitis     Anemia     Nontoxic uninodular goiter     Thrombocytopenia (HCC)     Mixed hyperlip continue.  -cont supportive care. Further recs to follow as the case evolves    D/w pts daughter at bedside  D/w Dr tinajero of     Thank you for allowing me to participate in the care of your patient.   Junior Morales MD  10/31/2020  6:17 PM

## 2020-11-01 ENCOUNTER — APPOINTMENT (OUTPATIENT)
Dept: ULTRASOUND IMAGING | Facility: HOSPITAL | Age: 85
DRG: 444 | End: 2020-11-01
Attending: HOSPITALIST
Payer: MEDICARE

## 2020-11-01 PROCEDURE — 99233 SBSQ HOSP IP/OBS HIGH 50: CPT | Performed by: HOSPITALIST

## 2020-11-01 PROCEDURE — 76700 US EXAM ABDOM COMPLETE: CPT | Performed by: HOSPITALIST

## 2020-11-01 RX ORDER — POTASSIUM CHLORIDE 20 MEQ/1
40 TABLET, EXTENDED RELEASE ORAL ONCE
Status: COMPLETED | OUTPATIENT
Start: 2020-11-01 | End: 2020-11-01

## 2020-11-01 RX ORDER — TAMSULOSIN HYDROCHLORIDE 0.4 MG/1
0.4 CAPSULE ORAL NIGHTLY
Status: DISCONTINUED | OUTPATIENT
Start: 2020-11-01 | End: 2020-11-06

## 2020-11-01 RX ORDER — PANTOPRAZOLE SODIUM 40 MG/1
40 TABLET, DELAYED RELEASE ORAL
Status: DISCONTINUED | OUTPATIENT
Start: 2020-11-02 | End: 2020-11-06

## 2020-11-01 NOTE — PLAN OF CARE
Problem: Patient Centered Care  Goal: Patient preferences are identified and integrated in the patient's plan of care  Description: Interventions:  - What would you like us to know as we care for you? Per daughter, pt likes to lie on his left side.  He al Maramec fall precautions as indicated by assessment.  - Educate pt/family on patient safety including physical limitations  - Instruct pt to call for assistance with activity based on assessment  - Modify environment to reduce risk of injury  - Provide a applied to coccyx/buttocks-blanchable redness, heels off bed. Continue to monitor.    Problem: Impaired Activities of Daily Living  Goal: Achieve highest/safest level of independence in self care  Description: Interventions:  - Assess ability and encourage

## 2020-11-01 NOTE — PHYSICAL THERAPY NOTE
PHYSICAL THERAPY EVALUATION - INPATIENT     Room Number: 459/842-H  Evaluation Date: 11/1/2020  Type of Evaluation: Initial   Physician Order: PT Eval and Treat    Presenting Problem: fatigue, febrile   Reason for Therapy: Mobility Dysfunction and Dischar training;Balance training  Rehab Potential : Fair  Frequency (Obs): 5x/week       PHYSICAL THERAPY MEDICAL/SOCIAL HISTORY       Problem List  Principal Problem:    Fatigue, unspecified type  Active Problems:    Fatigue    Dehydration    Elevated lactic aci 0    COGNITION  · Orientation Level:  oriented to place, oriented to time and oriented to person    [facility]  Upper extremity ROM and strength are within functional limits  Lower extremity ROM is within functional limits  Low is able to demonstrate supine - sit EOB @ level: supervision     Goal #1   Current Status    Goal #2 Patient is able to demonstrate transfers Sit to/from Stand at assistance level: supervision with walker - rolling     Goal #2  Current Status    Goal #3 Pt

## 2020-11-01 NOTE — PROGRESS NOTES
[place] [facility]    Progress Note    Niels Thompson Patient Status:  Inpatient    1926 MRN [id number]   Location [facility] 5SW/SE Attending Yi Reina MD   Hosp Day # 1 PCP Jeanne Sauer MD       Subjective:   Bharat Tony 3.375 g Intravenous Q8H   • allopurinol  300 mg Oral Daily   • amLODIPine Besylate  2.5 mg Oral Daily   • apixaban  5 mg Oral BID   • lidocaine-menthol  1 patch Transdermal Daily   • Pravastatin Sodium  40 mg Oral Nightly       Current PRN Inpatient Meds: found for this visit on 10/31/20. Imaging/EKG:   Us Abdomen Complete (cpt=76700)    Result Date: 11/1/2020  CONCLUSION:  1. Liver cirrhosis. 2. Cholelithiasis.     Dictated by (CST): Mala Sethi MD on 11/01/2020 at 9:13 AM     Finalized by (IGLESIA 2.       History of atrial fibrillation:    - continue Eliquis    3. Hypertension:    - continue antihypertensives. - hold parameters adjusted    4.        Hypercholesterolemia:    - continue statin    The patient is on a statin medication, will

## 2020-11-01 NOTE — H&P
[facility]    PATIENT'S NAME: Stiven Pabonmpf   ATTENDING PHYSICIAN: Hannah Santos MD   PATIENT ACCOUNT#:   [id number]    LOCATION:  [address] [place] [address] RECORD #:   [id number]       YOB: 1926  ADMISSION DATE:       10/31/20 HISTORY:  Skin lesion biopsy, carpal tunnel release, hip replacement, laminectomy, laparoscopic surgical partial nephrectomy. MEDICATIONS:  Home medications have been reviewed and reconciled.   Please refer to patient's chart for a detailed review santo been taken in the emergency department. Rapid COVID was negative. CT of the abdomen and pelvis as above.       ASSESSMENT AND PLAN:  The patient is a 42-year-old male with past medical history significant for multiple comorbid conditions who has been b

## 2020-11-01 NOTE — PROGRESS NOTES
[facility] INFECTIOUS DISEASE PROGRESS NOTE    Cathy Moreau Patient Status:  Inpatient    1926 MRN [id number]   Location [facility] 5SW/SE Attending Edgar Gaston MD   Hosp Day # 1 PCP Jorge Duncan MD         Subjective:  Cathy Moreau 95 11/01/2020    CA 8.1 11/01/2020    ALB 2.1 11/01/2020    ALKPHO 177 11/01/2020    BILT 2.0 11/01/2020    TP 6.0 11/01/2020    AST 46 11/01/2020    ALT 53 11/01/2020    MG 1.9 11/01/2020       Microbiology  No results found for this visit on 10/31/20. right hip     Actinic keratosis     Neoplasm of uncertain behavior of skin     Personal history of skin cancer     History of total left hip replacement     Sciatica of right side     Spinal stenosis of lumbar region with neurogenic claudication     Chroni

## 2020-11-01 NOTE — PLAN OF CARE
Problem: Patient Centered Care  Goal: Patient preferences are identified and integrated in the patient's plan of care  Description: Interventions:  - What would you like us to know as we care for you? Per daughter, pt likes to lie on his left side.  He al call for assistance with activity based on assessment  - Modify environment to reduce risk of injury  - Provide assistive devices as appropriate  - Consider OT/PT consult to assist with strengthening/mobility  - Encourage toileting schedule  Outcome: Progr self-care    Outcome: Progressing     Problem: NEUROLOGICAL - ADULT  Goal: Achieves stable or improved neurological status  Description: INTERVENTIONS  - Assess for and report changes in neurological status  - Initiate measures to prevent increased intracr

## 2020-11-02 ENCOUNTER — APPOINTMENT (OUTPATIENT)
Dept: NUCLEAR MEDICINE | Facility: HOSPITAL | Age: 85
DRG: 444 | End: 2020-11-02
Attending: HOSPITALIST
Payer: MEDICARE

## 2020-11-02 PROCEDURE — 78226 HEPATOBILIARY SYSTEM IMAGING: CPT | Performed by: HOSPITALIST

## 2020-11-02 PROCEDURE — 99233 SBSQ HOSP IP/OBS HIGH 50: CPT | Performed by: HOSPITALIST

## 2020-11-02 PROCEDURE — 99223 1ST HOSP IP/OBS HIGH 75: CPT | Performed by: INTERNAL MEDICINE

## 2020-11-02 NOTE — PROGRESS NOTES
[facility]    Progress Note    Elijah Miller Patient Status:  Inpatient    1926 MRN [id number]   Location [facility] 5SW/SE Attending Rene Partida MD   Hosp Day # 2 PCP Maryann Walker MD       Subjective:   Elijah [place] Transdermal Daily   • Pravastatin Sodium  40 mg Oral Nightly       Current PRN Inpatient Meds:      Normal Saline Flush, acetaminophen, morphINE sulfate **OR** morphINE sulfate **OR** morphINE sulfate, ondansetron HCl    Results:     Lab Results   Componen Culture Result No Growth 1 Day N/A       Imaging/EKG:   Us Abdomen Complete (cpt=76700)    Result Date: 11/1/2020  CONCLUSION:  1. Liver cirrhosis. 2. Cholelithiasis.     Dictated by (CST): Medardo Yap MD on 11/01/2020 at 9:13 AM     Finalized b blood culture and urine culture NGTD  - 24h Tmax 100.5, liver enzymes trending up, but patient is clinically improving without RUQ pain  - continue IV Zosyn    # Paroxysmal atrial fibrillation on Eliquis  # Hypertension  # Hypercholesterolemia  - Follow

## 2020-11-02 NOTE — PHYSICAL THERAPY NOTE
Attempted PT treatment session. Spoke with pt's dtr, she requested to allow pt to sleep as pt was up during the night and has a test at 1pm. She would like us to return later today. Will attempt PT later,  as schedule allows, family aware.     3pm, OT spoke

## 2020-11-02 NOTE — OCCUPATIONAL THERAPY NOTE
Attempted to complete OT evaluation with patient; Spoke with pt's dtr, she requested to allow pt to sleep as pt was up during the night and has a test at 1pm. She would like OT/PT to return later today.  Will attempt later schedule permitting; daughter agr

## 2020-11-02 NOTE — PROGRESS NOTES
[facility]    CDI Prediction Tool Protocol (Vancomycin Prophylaxis Deferred)      This patient is currently at high risk for developing CDI due to his/her score being >/= 13 points.   The current score is: 13    Score Breakdown:  High risk antibi

## 2020-11-02 NOTE — CM/SW NOTE
Voicemail left with pt's daughter, Luigi Morelos, in regards to discharge plans and recommendations. Requested a call back to complete assessments.  &  to remain available and supportive for discharge planning needs.

## 2020-11-02 NOTE — PROGRESS NOTES
[place] [facility] - [facility] PHILOMENA ID PROGRESS NOTE    Delta Jimenez Patient Status:  Inpatient    1926 MRN [id number]   Location [facility] 5SW/SE Attending Eric Chand MD   Hosp Day # 2 PCP Silvino Mitchell MD     Subjective:  ROS rev claudication     Chronic left-sided low back pain with left-sided sciatica     Recent weight loss     Memory loss     Fall, initial encounter     Atrial fibrillation and flutter ([address]     Weakness     Aortic stenosis     Atherosclerosis of coronary artery

## 2020-11-02 NOTE — PLAN OF CARE
Pt is wide awake, up in chair looking at family pictures in the laptop, daughter at bedside. Per daughter, pt is able to able to recognize family members. Pt voiced no complaints at this time.

## 2020-11-03 ENCOUNTER — APPOINTMENT (OUTPATIENT)
Dept: GENERAL RADIOLOGY | Facility: HOSPITAL | Age: 85
DRG: 444 | End: 2020-11-03
Attending: HOSPITALIST
Payer: MEDICARE

## 2020-11-03 ENCOUNTER — TELEPHONE (OUTPATIENT)
Dept: INTERNAL MEDICINE CLINIC | Facility: CLINIC | Age: 85
End: 2020-11-03

## 2020-11-03 ENCOUNTER — TELEPHONE (OUTPATIENT)
Dept: CARDIOLOGY | Age: 85
End: 2020-11-03

## 2020-11-03 PROCEDURE — 99223 1ST HOSP IP/OBS HIGH 75: CPT | Performed by: SURGERY

## 2020-11-03 PROCEDURE — 71045 X-RAY EXAM CHEST 1 VIEW: CPT | Performed by: HOSPITALIST

## 2020-11-03 PROCEDURE — 99233 SBSQ HOSP IP/OBS HIGH 50: CPT | Performed by: HOSPITALIST

## 2020-11-03 PROCEDURE — 99232 SBSQ HOSP IP/OBS MODERATE 35: CPT | Performed by: INTERNAL MEDICINE

## 2020-11-03 PROCEDURE — 99291 CRITICAL CARE FIRST HOUR: CPT | Performed by: INTERNAL MEDICINE

## 2020-11-03 RX ORDER — FUROSEMIDE 10 MG/ML
20 INJECTION INTRAMUSCULAR; INTRAVENOUS ONCE
Status: COMPLETED | OUTPATIENT
Start: 2020-11-03 | End: 2020-11-03

## 2020-11-03 RX ORDER — DOXEPIN HYDROCHLORIDE 50 MG/1
1 CAPSULE ORAL DAILY
Status: DISCONTINUED | OUTPATIENT
Start: 2020-11-03 | End: 2020-11-06

## 2020-11-03 NOTE — PROGRESS NOTES
[facility]     Gastroenterology Progress Note    Jovita Oms Patient Status:  Inpatient    1926 MRN [id number]   Location [facility] 5SW/SE Attending Nas Recio MD   Hosp Day # 3 PCP Mayco Gibbs MD       A bilaterally  Skin- No jaundice. Warm and dry. Ext: No cyanosis, clubbing or edema is evident.      Results:     Recent Labs   Lab 10/31/20  1517 11/01/20  0905 11/02/20  0735 11/03/20  0612   RBC 4.42 3.88 4.04 3.95   HGB 14.5 12.6* 13.2 12.8*   HCT 43.8 pleural effusion. Mild blunting of the left costophrenic angle. Normal heart size with mild interstitial edema. Correlate clinically and follow-up studies are advised.     Dictated by (CST): Magda Padilla MD on 11/03/2020 at 7:47 AM     Finalized by (CS

## 2020-11-03 NOTE — PLAN OF CARE
Problem: Patient Centered Care  Goal: Patient preferences are identified and integrated in the patient's plan of care  Description: Interventions:  - What would you like us to know as we care for you? Per daughter, pt likes to lie on his left side.  He al call for assistance with activity based on assessment  - Modify environment to reduce risk of injury  - Provide assistive devices as appropriate  - Consider OT/PT consult to assist with strengthening/mobility  - Encourage toileting schedule  Outcome: Progr appropriate interventions as ordered  Outcome: Progressing     Problem: GENITOURINARY - ADULT  Goal: Absence of urinary retention  Description: INTERVENTIONS:  - Assess patient’s ability to void and empty bladder  - Monitor intake/output and perform bladde

## 2020-11-03 NOTE — SLP NOTE
ADULT SWALLOWING EVALUATION    ASSESSMENT    ASSESSMENT/OVERALL IMPRESSION:  PPE REQUIRED. THIS THERAPIST WORE GOWN, GLOVES, AND GOGGLES FOR DURATION OF EVALUATION. HANDS WASHED UPON ENTRANCE/EXIT.     Per RN Rakan Course, pt to remain NPO d/t possible procedure b recommendations reviewed with JAMIA Lozoya and family. SLP collaborated with RN for MD diet orders. FCM SCORE: 5/7     PLAN: SLP to f/u x2-3 meal assessment, monitor CXR, and VFSS as appropriate.      RECOMMENDATIONS   Diet Recommendations - Solids: [place] than left suggesting bilateral pneumonia. Probable right pleural effusion. Mild blunting of the left costophrenic angle. Normal heart size with mild   interstitial edema. Correlate clinically and follow-up studies are advised.            Dictated by (CS FOLLOW UP  Treatment Plan/Recommendations: Aspiration precautions  Number of Visits to Meet Established Goals: 3  Follow Up Needed: Yes  SLP Follow-up Date: 11/04/20    Thank you for your referral.   If you have any questions, please contact Emeline Denver

## 2020-11-03 NOTE — TELEPHONE ENCOUNTER
Patients daughter, Karen Mills is asking for your advice on her fathers gallstone issues and an upcoming procedure. She is getting conflicting information from hospital.  Was diagnosed with aspiration pneumonia today.     She is asking for your advice and Ailinalivia Lopez

## 2020-11-03 NOTE — RESPIRATORY THERAPY NOTE
RESPIRATORY THERAPY RAPID RESPONSE NOTE    RRT called for respiratory distress? yes  Breathing pattern: Normal  Patient currently being seen by Respiratory Care? no    RT interventions necessary? yes    Oxygen applied/increased?  yes nc -5lpm  Nebulizer per

## 2020-11-03 NOTE — SIGNIFICANT EVENT
[address] Patient Status:  Inpatient    1926 MRN [id number]   Location [facility] 5SW/SE Attending Sherine Feldman MD   Hosp Day # 3 PCP Roz Ybarra MD       Subjective:   RRT called. issues.     SKIN:  Warm and well perfused  PSYCHIATRIC: Unable to evaluate    Current Scheduled Inpatient Meds:     • tamsulosin HCl  0.4 mg Oral Nightly   • Pantoprazole Sodium  40 mg Oral QAM AC   • piperacillin-tazobactam  3.375 g Intravenous Q8H   • all (36339)    Result Date: 10/29/2020  CONCLUSION:  Severe chronic microvascular ischemic disease without acute intracranial abnormality.    Dictated by (CST): Greg Freeman MD on 10/29/2020 at 10:04 AM     Finalized by (CST): Greg Freeman MD on 10/29/2020 at 1 (CST): Lucila Forbes MD on 11/03/2020 at 7:50 AM          Xr Chest Ap Portable  (cpt=71045)    Result Date: 10/31/2020  CONCLUSION:   Trace left pleural effusion with left lower lobe atelectasis, unchanged.     Dictated by (CST): Gilbert Dwyer MD on 10/31/2 simple commands and moving all ext. Less likely CVA, but consider CT head if persists. Will dw attending.  - Cont treating underlying causes as above. Close monitoring     Discussed findings/RRT with Primary Attending and Dr. Nilsa Gomez.      CC time, greater th

## 2020-11-03 NOTE — PROGRESS NOTES
S: Pt in chair asleep. Daughter present in room. O: Daughter shared that a eucharistic  came earlier in the day and prayed with pt. Daughter asked  to come by tomorrow.     A:  followed up on a nurse referral.    P:  will

## 2020-11-03 NOTE — CONSULTS
[facility] - [facility]    Report of Consultation    Mary Devine Patient Status:  Inpatient    1926 MRN [id number]   Location [facility] 5SW/SE Attending Maddy Leary MD   Hosp Day # 3 PCP Haritha Hanley MD     Date of Admission: Brother         CHF (cause of death)   • Cancer Brother 67        stomach    • Lipids Other         family h/o hyperlipidemia and vascular disease     Social History:  Social History    Tobacco Use      Smoking status: Former Smoker        Years: 40.00 c/c/e  Skin intact, no jaundice, no rashes, no lesions       Results:     Lab Results   Component Value Date    WBC 8.3 11/03/2020    HGB 12.8 (L) 11/03/2020    HCT 37.3 (L) 11/03/2020    .0 (L) 11/03/2020    CREATSERUM 1.14 11/03/2020    BUN 20 (H) candidate. Recommend IR consultation for consideration of percutaneous cholecystostomy. Hold anticoagulation. Will discuss with pt's daughter. Case discussed with Dr. Florence Aranda and Dr. Sudha Rizzo.      Addendum: Dr. Sudha Rizzo recommends observation at this time a

## 2020-11-03 NOTE — PROGRESS NOTES
[facility]    [facility] Note    Refugio Martinez Patient Status:  Inpatient    1926 MRN [id number]   Location [facility] 5SW/SE Attending Marium Barr MD   Hosp Day # 3 PCP Tammie Barlow MD       Subjective:   Refugio Martinez apixaban  5 mg Oral BID   • lidocaine-menthol  1 patch Transdermal Daily   • Pravastatin Sodium  40 mg Oral Nightly       Current PRN Inpatient Meds:      Normal Saline Flush, acetaminophen, morphINE sulfate **OR** morphINE sulfate **OR** morphINE sulfate, None (Preliminary result)    Collection Time: 10/31/20  3:46 PM    Specimen: Blood,peripheral   Result Value Ref Range    Blood Culture Result No Growth 2 Days N/A       Imaging/EKG:   Nm Gb Hepatobiliary Scan  (cpt=78226)    Result Date: 11/2/2020  CONCLU

## 2020-11-03 NOTE — PLAN OF CARE
Patient resting in bed. Forgetful & confused, oriented to self only. Vital signs stable. Denied pain. Continues on IV zosyn. Bed locked, lowest position, call light within reach, and bed alarm on.      Problem: Patient Centered Care  Goal: Patient preferen deficits and behaviors that affect risk of falls.   - [place] fall precautions as indicated by assessment.  - Educate pt/family on patient safety including physical limitations  - Instruct pt to call for assistance with activity based on assessment  - Mod Promote sitting position while performing ADLs such as feeding, grooming, and bathing  - Educate and encourage patient/family in tolerated functional activity level and precautions during self-care  Outcome: Progressing     Problem: NEUROLOGICAL - ADULT  G

## 2020-11-03 NOTE — PROGRESS NOTES
[place] [facility]    Ayana Medrano Patient Status:  Inpatient    1926 MRN [id number]   Location [facility] 5SW/SE Attending Sergo Love MD   Hosp Day # 3 PCP Nina Wagner MD     Subjective:  ROS rev replacement     Sciatica of right side     Spinal stenosis of lumbar region with neurogenic claudication     Chronic left-sided low back pain with left-sided sciatica     Recent weight loss     Memory loss     Fall, initial encounter     Atrial fibrillatio

## 2020-11-03 NOTE — SLP NOTE
SLP orders received and acknowledged. SLP attempt this AM. Per RN Winifred Neal, RRT called this AM. SLP to follow up as pt appropriate/alert and/or as schedule allows. Please contact SLP with any questions, concerns, and/or change in status. Thank you.     Martha

## 2020-11-03 NOTE — DIETARY NOTE
ADULT NUTRITION INITIAL ASSESSMENT    RECOMMENDATIONS TO MD:  See Nutrition Intervention    Pt is at moderate nutrition risk. Pt does not meet malnutrition criteria.       NUTRITION DIAGNOSIS/PROBLEM:  Inadequate oral intake related to decreased ability to Estimated Nutrition needs: --dosing wt of 79 kg  Calories: 1975 calories/day (25 calories per kg Actual body wt (ABW))    Protein: 95 g protein/day (1.2 g protein/kg  Actual body wt (ABW))  - Diet: NPO.  ( was on Cardiac diet prior to NPO).   - Meals and %    11/01/20 1230  75 %    11/01/20 1720  50 %    11/02/20 0900  90 %    Percent Meals Eaten (%): breakfast at 11/02/20 0900    11/02/20 1311  0 %    Percent Meals Eaten (%): npo at 11/02/20 1311    11/03/20 0852  0 %    Percent Meals Eaten (%): npo at 11

## 2020-11-04 PROCEDURE — 99232 SBSQ HOSP IP/OBS MODERATE 35: CPT | Performed by: INTERNAL MEDICINE

## 2020-11-04 PROCEDURE — 99233 SBSQ HOSP IP/OBS HIGH 50: CPT | Performed by: HOSPITALIST

## 2020-11-04 PROCEDURE — 99232 SBSQ HOSP IP/OBS MODERATE 35: CPT | Performed by: SURGERY

## 2020-11-04 RX ORDER — POTASSIUM CHLORIDE 20 MEQ/1
40 TABLET, EXTENDED RELEASE ORAL EVERY 4 HOURS
Status: COMPLETED | OUTPATIENT
Start: 2020-11-04 | End: 2020-11-04

## 2020-11-04 RX ORDER — FUROSEMIDE 10 MG/ML
20 INJECTION INTRAMUSCULAR; INTRAVENOUS ONCE
Status: COMPLETED | OUTPATIENT
Start: 2020-11-04 | End: 2020-11-04

## 2020-11-04 NOTE — SLP NOTE
SPEECH DAILY NOTE - INPATIENT    ASSESSMENT & PLAN   ASSESSMENT  PPE REQUIRED. THIS SLP WORE GLOVES AND DROPLET MASK. HANDS SANITIZED/WASHED UPON ENTRANCE/EXIT. SLP f/u for ongoing meal assessment per recommendations of BSE.  RN and pt daughter reports pt Recommended: Slow rate; No straws; Alternate consistencies;Small bites and sips  Aspiration Precautions: Upright position; Slow rate;Small bites and sips; No straw  Medication Administration Recommendations: One pill at a time    Patient Experiencing Pain: No Date: 11/05/20  Number of Visits to Meet Established Goals: 3    Session: 1x following BSE.     If you have any questions, please contact [address] Reed M.S. [address] [facility]  Speech-Language Pathologist  Ext: [phone]

## 2020-11-04 NOTE — OCCUPATIONAL THERAPY NOTE
OCCUPATIONAL THERAPY TREATMENT NOTE - INPATIENT        Room Number: 351/875-N                Problem List  Principal Problem:    Fatigue, unspecified type  Active Problems:    Fatigue    Dehydration    Elevated lactic acid level      OCCUPATIONAL THERAPY A room)  BP Location: Right arm  BP Method: Automatic  Patient Position: Sitting    O2 SATURATIONS        SPO2 Ambulation on Oxygen: 98  Ambulation oxygen flow (liters per minute): 3    ACTIVITIES OF DAILY LIVING ASSESSMENT  AM-PAC ‘6-Clicks’ Inpatient Daily

## 2020-11-04 NOTE — CM/SW NOTE
11/4: PT/OT recommending HH with 24/7 vs SNF. Met with patient and daughter Silvia Cartagena for assessment. Patient and daughter Em Valenzuela live in a 2 story home, patient stays on the first floor. Patient has a private pay caregiver 3 days/wk, 8 hrs/day.  Em Valenzuela wor

## 2020-11-04 NOTE — PROGRESS NOTES
[place] [facility]    Progress Note    Refugio Martinez Patient Status:  Inpatient    1926 MRN [id number]   Location [facility] 5SW/SE Attending Marium Barr MD   Hosp Day # 4 PCP Tammie Barlow MD       Subjective:   Refugio Martinez piperacillin-tazobactam  3.375 g Intravenous Q8H   • allopurinol  300 mg Oral Daily   • amLODIPine Besylate  2.5 mg Oral Daily   • lidocaine-menthol  1 patch Transdermal Daily   • Pravastatin Sodium  40 mg Oral Nightly       Current PRN Inpatient Meds: Date    PT 14.1 06/02/2014    PT 14.2 12/02/2013    INR 1.86 (H) 11/03/2020    INR 1.1 06/02/2014    INR 1.1 12/02/2013       Culture:  Hospital Encounter on 10/31/20   1.  BLOOD CULTURE     Status: None (Preliminary result)    Collection Time: 10/31/20  3: enzymes elevated but patient has no RUQ pain  - Daughter eventually declined percutaneous cholecystostomy tube 11/4/20 after prolonged discussions  - Zosyn (10/31- )    # Aspiration pneumonia  - RRT called AM 11/3/20 for lethargy and low sats  - CXR 11/3 s

## 2020-11-04 NOTE — PROGRESS NOTES
[facility]  Progress Note    Lucio Avendano Patient Status:  Inpatient    1926 MRN [id number]   Location [facility] 5SW/SE Attending Destiny Vieyra MD   Hosp Day # 4 PCP Erica Workman MD     Date of Admission:  10/31/2020 • Heart Attack Mother         CHF/CRF (cause of death)   • Diabetes Mother    • Hypertension Mother    • Heart Disease Sister         CAD (cause of death)   • Heart Attack Brother         CHF (cause of death)   • Cancer Brother 67        stomach    • Lip 98%.   Physical Exam  Alert  HEENT wnl, anicteric   Neck supple, norm ROM, no JVD  L CTA B  H Reg rate  Abd soft, NT, ND, no masses, no hernias, no HSM.   Extr no c/c/e  Skin intact, no jaundice, no rashes, no lesions       Results:     Lab Results   Compon Recommendations:         79 yo male with MS changes, cholecystitis, elevated LFTs, cirrhosis. This is a complicated case in a patient who is a poor surgical candidate. Continue abx. MRCP if clinically indicated. No surgery planned at this time.   Case discus

## 2020-11-04 NOTE — IMAGING NOTE
I met with the patient and his daughter to discuss options regarding cholecystitis. We discussed the risks (bleeding, infection, catheter dislodgement with peritonitis), benefits, and alternatives to percutaneous cholecystostomy tube insertion.   I also

## 2020-11-04 NOTE — PROGRESS NOTES
RRT    *See RRT Documentation Record*    Reason the RRT was called: lethargy and low saturation 71% on room air,ams  Assessment of patient leading up to RRT: o2 saturation 71% on room air,lethargic  Interventions/Testing: chest xray/labs ordered prior to r

## 2020-11-04 NOTE — PHYSICAL THERAPY NOTE
PHYSICAL THERAPY TREATMENT NOTE - INPATIENT     Room Number: 303/358-F       Presenting Problem: fatigue, febrile     Problem List  Principal Problem:    Fatigue, unspecified type  Active Problems:    Fatigue    Dehydration    Elevated lactic acid level FORM - BASIC MOBILITY  How much difficulty does the patient currently have. ..  -   Turning over in bed (including adjusting bedclothes, sheets and blankets)?: A Little   -   Sitting down on and standing up from a chair with arms (e.g., wheelchair, bedside with RW and PREM ROBERTS. Goal #4   Current Status  CGA with amb 48' with 02 support and CGA         Goal #5 Patient to demonstrate independence with home activity/exercise instructions provided to patient in preparation for discharge.    Goal #5   Current Stat

## 2020-11-04 NOTE — PROGRESS NOTES
[facility]     Gastroenterology Progress Note    [facility] Patient Status:  Inpatient    1926 MRN [id number]   Location [facility] 5SW/SE Attending Ada Whelan MD   Hosp Day # 4 PCP Alvaro Whalen MD       A edema is evident.      Results:     Recent Labs   Lab 10/31/20  1517 11/01/20  0905 11/02/20  0735 11/03/20  0612   RBC 4.42 3.88 4.04 3.95   HGB 14.5 12.6* 13.2 12.8*   HCT 43.8 37.2* 38.7* 37.3*   MCV 99.1 95.9 95.8 94.4   MCH 32.8 32.5 32.7 32.4   MCHC 3

## 2020-11-04 NOTE — PROGRESS NOTES
S: Pt sitting in chair. Daughter sitting on couch. O: Pt and daughter shared that they were doing well and hoping to discharge soon. Pt's daughters have agreed on care plan moving forward. A:  provided empathic presence and active listening.

## 2020-11-04 NOTE — PLAN OF CARE
Problem: Patient Centered Care  Goal: Patient preferences are identified and integrated in the patient's plan of care  Description: Interventions:  - What would you like us to know as we care for you? Per daughter, pt likes to lie on his left side.  He al call for assistance with activity based on assessment  - Modify environment to reduce risk of injury  - Provide assistive devices as appropriate  - Consider OT/PT consult to assist with strengthening/mobility  - Encourage toileting schedule  Outcome: Progr self-car  Outcome: Progressing     Problem: NEUROLOGICAL - ADULT  Goal: Achieves stable or improved neurological status  Description: INTERVENTIONS  - Assess for and report changes in neurological status  - Initiate measures to prevent increased intracrani

## 2020-11-04 NOTE — PROGRESS NOTES
[place] [facility]    Deven Martinez Patient Status:  Inpatient    1926 MRN [id number]   Location [facility] 5SW/SE Attending Nolan Rivera MD   Hosp Day # 4 PCP Pamela Christian MD     Subjective:  ROS rev lumbar region with neurogenic claudication     Chronic left-sided low back pain with left-sided sciatica     Recent weight loss     Memory loss     Fall, initial encounter     Atrial fibrillation and flutter ([address]     Weakness     Aortic stenosis     Ather

## 2020-11-05 ENCOUNTER — APPOINTMENT (OUTPATIENT)
Dept: GENERAL RADIOLOGY | Facility: HOSPITAL | Age: 85
DRG: 444 | End: 2020-11-05
Attending: HOSPITALIST
Payer: MEDICARE

## 2020-11-05 LAB
NT-PROBNP SERPL-MCNC: 2736 PG/ML
TSH SERPL-ACNC: 5.14 MCUNITS/ML

## 2020-11-05 PROCEDURE — 99233 SBSQ HOSP IP/OBS HIGH 50: CPT | Performed by: HOSPITALIST

## 2020-11-05 PROCEDURE — 74230 X-RAY XM SWLNG FUNCJ C+: CPT | Performed by: HOSPITALIST

## 2020-11-05 PROCEDURE — 99222 1ST HOSP IP/OBS MODERATE 55: CPT | Performed by: INTERNAL MEDICINE

## 2020-11-05 PROCEDURE — 99232 SBSQ HOSP IP/OBS MODERATE 35: CPT | Performed by: INTERNAL MEDICINE

## 2020-11-05 RX ORDER — FUROSEMIDE 10 MG/ML
20 INJECTION INTRAMUSCULAR; INTRAVENOUS
Status: DISCONTINUED | OUTPATIENT
Start: 2020-11-05 | End: 2020-11-06

## 2020-11-05 NOTE — PLAN OF CARE
VSS. Denies pain. Tele in place - no calls. Continues on IV abx. Weaned to 1L O2 NC. Up to chair for meals. Daughter at bedside.       Problem: Patient Centered Care  Goal: Patient preferences are identified and integrated in the patient's plan of care  [place] precautions as indicated by assessment.  - Educate pt/family on patient safety including physical limitations  - Instruct pt to call for assistance with activity based on assessment  - Modify environment to reduce risk of injury  - Provide assistive device grooming, and bathing  - Educate and encourage patient/family in tolerated functional activity level and precautions during self-care    Outcome: Progressing     Problem: NEUROLOGICAL - ADULT  Goal: Achieves stable or improved neurological status  Descript

## 2020-11-05 NOTE — OCCUPATIONAL THERAPY NOTE
OT attempted treatment; RN provided consent to proceed;  Patient rcvd in bed sleeping soundly;  daughter present- reports patient was up throughout the night with the lasix treatment and had video swallow earlier- discussed patient had not had a chance to e

## 2020-11-05 NOTE — SLP NOTE
ADULT VIDEOFLUOROSCOPIC SWALLOWING STUDY    Admission Date: 10/31/2020  Evaluation Date: 11/05/20  Radiologist: Dr. Givens Esters: Mechanical soft chopped  Diet Recommendations - Liquid: Thin(CHIN TUCK )  1:1 Recent: Pneumonia  Precautions: Aspiration  Imaging results:   CXR   =====  CONCLUSION:   1. Significant interval worsening in the chest with extensive bibasilar airspace disease right more than left suggesting bilateral pneumonia.   Probable right pleural Impaired  Triggered at: Pyriform sinuses  Laryngeal Penetration: During the swallow  Tracheal Aspiration: None  Strategy(ies) Implemented (Nectar Thick): Liquids via spoon; Slow rate;Small bites and sips  Effectiveness: Partial     PUREE  Oral Phase of Swal sips and chin tuck posture. RN contacted with results/recommendations.          GOALS  Goal #1 The patient will tolerate chopped solid  consistency and thin liquids via chin tuck posture without overt signs or symptoms of aspiration with 100 % accuracy over

## 2020-11-05 NOTE — PROGRESS NOTES
[facility]    Progress Note    Rancho Billingsley Patient Status:  Inpatient    1926 MRN [id number]   Location [facility] 5SW/SE Attending Suzan Mancera MD   Hosp Day # 5 PCP Meliza Noriega MD       Subjective:   [place] HCl  0.4 mg Oral Nightly   • Pantoprazole Sodium  40 mg Oral QAM AC   • piperacillin-tazobactam  3.375 g Intravenous Q8H   • allopurinol  300 mg Oral Daily   • amLODIPine Besylate  2.5 mg Oral Daily   • Pravastatin Sodium  40 mg Oral Nightly       Current 6.4 6.2*  --  6.6     Lab Results   Component Value Date    PT 14.1 06/02/2014    PT 14.2 12/02/2013    INR 1.86 (H) 11/03/2020    INR 1.1 06/02/2014    INR 1.1 12/02/2013       Culture:  Hospital Encounter on 10/31/20   1.  BLOOD CULTURE     Status: None ( Gout  - continue home meds    Diet: cardiac  PT/OT: rec DONI vs HH  DVT ppx: SCD  Code status: Full  Dispo: per clinical course    Greater than 35 minutes spent, >50% spent counseling re: treatment plan and workup.   Penelope Lemon MD  [facility]

## 2020-11-05 NOTE — SLP NOTE
SPEECH DAILY NOTE - INPATIENT    ASSESSMENT & PLAN   ASSESSMENT  PPE: DROPLET MASK AND GLOVES. HAND SANITIZED UPON ENTRANCE/EXIT. SLP f/u for ongoing family education and training of safe swallowing compensatory strategies per results of AM VFSS.    Pt' Mechanical soft chopped  Diet Recommendations - Liquid: Thin(CHIN TUCK )  1:1 FEEDING ASSISTANCE/SUPPORT ACROSS ALL INTAKE TO REINFORCE CHIN TUCK POSTURE   Compensatory Strategies Recommended: Slow rate; Chin tuck; Small bites and sips; Alternate consistencie has been staying with the pt during his stay. Ivette reports she will reinforce the chin tuck during meals.      In Progress   Goal #3 The patient will utilize compensatory strategies as outlined by  VFSS including Slow rate, Small bites, Small sips, No stra

## 2020-11-05 NOTE — CONSULTS
Cardiology (consult dictated). Assessment:  1. Pulmonary congestion. Possible aspiration pneumonia versus hospital-acquired pneumonia versus congestive heart failure. Does not have other signs of significant volume expansion.   Weight is increasing th

## 2020-11-05 NOTE — PROGRESS NOTES
[facility] 89     Gastroenterology Progress Note    Silvina Pate Patient Status:  Inpatient    1926 MRN [id number]   Location [facility] 5SW/SE Attending Monserrat Crandall MD   Hosp Day # 5 PCP Sarah Honeycutt MD       A HGB 14.5 12.6* 13.2 12.8* 12.7*   HCT 43.8 37.2* 38.7* 37.3* 38.1*   MCV 99.1 95.9 95.8 94.4 96.7   MCH 32.8 32.5 32.7 32.4 32.2   MCHC 33.1 33.9 34.1 34.3 33.3   RDW 14.4 14.2 14.4 14.2 14.5   NEPRELIM 6.74 4.19 4.88  --   --    WBC 8.3 6.2 6.9 8.3 8.4 effusion. Mild blunting of the left costophrenic angle. Normal heart size with mild interstitial edema. Correlate clinically and follow-up studies are advised.     Dictated by (CST): Lucila Forbes MD on 11/03/2020 at 7:47 AM     Finalized by (CST): Gabriella Chou

## 2020-11-05 NOTE — SLP NOTE
SPEECH DAILY NOTE - INPATIENT    ASSESSMENT & PLAN   ASSESSMENT  PPE REQUIRED. THIS THERAPIST WORE GLOVES, DROPLET MASK, AND GOGGLES FOR DURATION OF TX SESSION. HANDS WASHED UPON ENTRANCE/EXIT. Pt upgraded to thin liquids yesterday.  Per pt's daughter pt interval worsening in the chest with extensive bibasilar airspace disease right more than left suggesting bilateral pneumonia. Probable right pleural effusion. Mild blunting of the left costophrenic angle.   Normal heart size with mild   interstitial christoph REGULAR consistency and THIN liquids without overt signs or symptoms of aspiration with 100 % accuracy over 2-3 session(s). Goal Updated 11/4/2020:   The patient will tolerate trial upgrade of REGULAR consistency without overt signs or symptoms of aspira

## 2020-11-06 ENCOUNTER — TELEPHONE (OUTPATIENT)
Dept: GASTROENTEROLOGY | Facility: CLINIC | Age: 85
End: 2020-11-06

## 2020-11-06 ENCOUNTER — APPOINTMENT (OUTPATIENT)
Dept: GENERAL RADIOLOGY | Facility: HOSPITAL | Age: 85
DRG: 444 | End: 2020-11-06
Attending: HOSPITALIST
Payer: MEDICARE

## 2020-11-06 VITALS
HEIGHT: 71 IN | TEMPERATURE: 98 F | WEIGHT: 166.31 LBS | HEART RATE: 51 BPM | DIASTOLIC BLOOD PRESSURE: 61 MMHG | OXYGEN SATURATION: 96 % | RESPIRATION RATE: 18 BRPM | BODY MASS INDEX: 23.28 KG/M2 | SYSTOLIC BLOOD PRESSURE: 132 MMHG

## 2020-11-06 LAB
HCT VFR BLD CALC: 38.9 %
HGB BLD-MCNC: 13.2 G/DL
PLATELET # BLD: 210 K/MCL
RBC # BLD: 4.02 10*6/UL
WBC # BLD: 9.7 K/MCL

## 2020-11-06 PROCEDURE — 99232 SBSQ HOSP IP/OBS MODERATE 35: CPT | Performed by: INTERNAL MEDICINE

## 2020-11-06 PROCEDURE — 99239 HOSP IP/OBS DSCHRG MGMT >30: CPT | Performed by: HOSPITALIST

## 2020-11-06 PROCEDURE — 99232 SBSQ HOSP IP/OBS MODERATE 35: CPT | Performed by: NURSE PRACTITIONER

## 2020-11-06 PROCEDURE — 71046 X-RAY EXAM CHEST 2 VIEWS: CPT | Performed by: HOSPITALIST

## 2020-11-06 RX ORDER — CEFDINIR 300 MG/1
300 CAPSULE ORAL 2 TIMES DAILY
Qty: 14 CAPSULE | Refills: 0 | Status: SHIPPED | OUTPATIENT
Start: 2020-11-06 | End: 2020-11-13

## 2020-11-06 RX ORDER — FUROSEMIDE 20 MG/1
20 TABLET ORAL DAILY
Status: DISCONTINUED | OUTPATIENT
Start: 2020-11-06 | End: 2020-11-06

## 2020-11-06 RX ORDER — FUROSEMIDE 20 MG/1
20 TABLET ORAL DAILY
Qty: 30 TABLET | Refills: 0 | Status: SHIPPED | OUTPATIENT
Start: 2020-11-06 | End: 2020-11-17

## 2020-11-06 RX ORDER — METRONIDAZOLE 500 MG/1
500 TABLET ORAL 3 TIMES DAILY
Qty: 21 TABLET | Refills: 0 | Status: SHIPPED | OUTPATIENT
Start: 2020-11-06 | End: 2020-11-13

## 2020-11-06 NOTE — PLAN OF CARE
Problem: Patient Centered Care  Goal: Patient preferences are identified and integrated in the patient's plan of care  Description: Interventions:  - What would you like us to know as we care for you? Per daughter, pt likes to lie on his left side.  He al RN  Outcome: Adequate for Discharge  11/6/2020 1433 by Melani Hernandez RN  Outcome: Adequate for Discharge     Problem: SAFETY ADULT - FALL  Goal: Free from fall injury  Description: INTERVENTIONS:  - Assess pt frequently for physical needs  - Identify cogni intact  Description: INTERVENTIONS  - Assess and document risk factors for pressure ulcer development  - Assess and document skin integrity  - Monitor for areas of redness and/or skin breakdown  - Initiate interventions, skin care algorithm/standards of ca collaborating with pharmacy to review patient's medication profile  - Implement strategies to promote bladder emptying  11/6/2020 1543 by Pawel Vasquez, RN  Outcome: Adequate for Discharge  11/6/2020 1433 by Pawel Vasquez, RN  Outcome: Adequate for Discharge

## 2020-11-06 NOTE — PLAN OF CARE
Patient is doing well and is ready for discharge. IV was removed during discharge education, patient is awaiting wife to bring him clothes and patient will be discharging today.

## 2020-11-06 NOTE — PLAN OF CARE
VSS. Denies pain. Tele in place. IV abx. Video swallow completed this afternoon. Weaned to RA - plan for nocturnal oximetry assessment this evening. Daughter at bedside.        Problem: Patient Centered Care  Goal: Patient preferences are identified and int that affect risk of falls.   - [place] fall precautions as indicated by assessment.  - Educate pt/family on patient safety including physical limitations  - Instruct pt to call for assistance with activity based on assessment  - Modify environment to redu position while performing ADLs such as feeding, grooming, and bathing  - Educate and encourage patient/family in tolerated functional activity level and precautions during self-care    Outcome: Progressing     Problem: NEUROLOGICAL - ADULT  Goal: Achieves

## 2020-11-06 NOTE — CM/SW NOTE
[facility] orders received. SW notified Dee, with [facility] of the new HHC order. Plan is for discharge home today. [facility] is aware.       Daija Messina [facility] ext [phone]

## 2020-11-06 NOTE — PROGRESS NOTES
[place] [facility]    Aylin Newman Patient Status:  Inpatient    1926 MRN [id number]   Location [facility] 5SW/SE Attending Chad Rendon MD   Hosp Day # 6 PCP Leonce Seip, MD     Subjective:  ROS rev left hip replacement     Sciatica of right side     Spinal stenosis of lumbar region with neurogenic claudication     Chronic left-sided low back pain with left-sided sciatica     Recent weight loss     Memory loss     Fall, initial encounter     Atrial fi

## 2020-11-06 NOTE — PROGRESS NOTES
[facility]    Progress Note    Mannie Hull Patient Status:  Inpatient    1926 MRN [id number]   Location [facility] 5SW/SE Attending Hans Dillon MD   Hosp Day # 6 PCP Madiha Jama MD        Subjective:     Homer Oliver 06/02/2014    T4F 1.3 11/05/2020    TSH 5.140 (H) 11/05/2020    MG 1.9 11/01/2020    PHOS 2.5 11/03/2020    TROP <0.045 03/28/2019    CK 44 10/27/2020    B12 910 09/05/2019       Xr Video Swallow (cpt=74230)    Result Date: 11/5/2020  CONCLUSION:  1.  Evide

## 2020-11-06 NOTE — RESPIRATORY THERAPY NOTE
Pt is hooked up to Nocturnal Pulse ox with recording. Data will be uploaded in the morning and placed in pt's chart.

## 2020-11-06 NOTE — PROGRESS NOTES
[facility]     Gastroenterology Progress Note    [place] Patient Status:  Inpatient    1926 MRN [id number]   Location [facility] 5SW/SE Attending Suzan Mancera MD   Hosp Day # 6 PCP Meliza Noriega MD       A edema is evident.      Results:     Recent Labs   Lab 11/01/20  0905 11/02/20  0735 11/03/20  0612 11/05/20  0738 11/06/20  0819   RBC 3.88 4.04 3.95 3.94 4.02   HGB 12.6* 13.2 12.8* 12.7* 13.2   HCT 37.2* 38.7* 37.3* 38.1* 38.9*   MCV 95.9 95.8 94.4 96.7 9 Chidi Stinson MD on 11/05/2020 at 10:54 AM                Marlon Ramires.  Herrera Blanchard

## 2020-11-06 NOTE — PLAN OF CARE
Problem: Patient Centered Care  Goal: Patient preferences are identified and integrated in the patient's plan of care  Description: Interventions:  - What would you like us to know as we care for you? Per daughter, pt likes to lie on his left side.  He al call for assistance with activity based on assessment  - Modify environment to reduce risk of injury  - Provide assistive devices as appropriate  - Consider OT/PT consult to assist with strengthening/mobility  - Encourage toileting schedule  Outcome: Progr self-care  Outcome: Progressing     Problem: NEUROLOGICAL - ADULT  Goal: Achieves stable or improved neurological status  Description: INTERVENTIONS  - Assess for and report changes in neurological status  - Initiate measures to prevent increased intracran

## 2020-11-06 NOTE — CONSULTS
[facility]    PATIENT'S NAME: Gaudencio Rossi   ATTENDING PHYSICIAN: Estefanía Bonner MD   CONSULTING PHYSICIAN: Alvaro Rey.  Joel Islas MD   PATIENT ACCOUNT#:   [id number]    LOCATION:  [address] [place] [address] Rd #:   [id number]       DATE OF BIRTH:  1 Well-developed, well-nourished male in no acute distress. He is awake and alert. VITAL SIGNS:  Blood pressure 140/63; respirations 20; pulse 50, irregular; afebrile; saturation 98%. HEENT:  Unremarkable. NECK:  Supple.   There is no significant jugular 16:25:49  t: 11/05/2020 18:44:36  Job [id number]  [facility]/

## 2020-11-06 NOTE — DISCHARGE SUMMARY
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   Infectious Disease was placed on consult and the patient w GI  ID  Surgery  Cardiology    Procedures: n/a    Complications: n/a    Discharge Condition: Good    Discharge Medications:      Discharge Medications      START taking these medications      Instructions Prescription details   cefdinir 300 MG Caps  Comm Mens Tabs      Take 1 tablet by mouth daily. Refills: 0     FIBER ADULT GUMMIES OR      Take 4 tablets by mouth daily with lunch. Refills: 0     Lovastatin 40 MG Tabs      Take 40 mg by mouth nightly.    Refills: 0     omeprazole 20 MG Cpdr  Commonly kn

## 2020-11-06 NOTE — TELEPHONE ENCOUNTER
I spoke with patient's daughter Nithya Urbina (ok per fyi)    She accepted appointment on patient's behalf for 12/3 (which is the first available appointment due to the holiday). I gave detailed directions to [facility] office.     She is aware that her father is to com

## 2020-11-06 NOTE — TELEPHONE ENCOUNTER
Pt being discharged from hospital - cholecystitis treated medically due to advanced age and comorbid conditions  Follow up in [facility] in 2 - 3 weeks  Liver panel next week, make sure hospitalist put in computer.

## 2020-11-09 ENCOUNTER — TELEPHONE (OUTPATIENT)
Dept: INTERNAL MEDICINE CLINIC | Facility: CLINIC | Age: 85
End: 2020-11-09

## 2020-11-09 ENCOUNTER — PATIENT OUTREACH (OUTPATIENT)
Dept: CASE MANAGEMENT | Age: 85
End: 2020-11-09

## 2020-11-09 NOTE — PROGRESS NOTES
Initial Post Discharge Follow Up   Discharge Date: 11/6/20  Contact Date: 11/9/2020    Consent Verification:  Assessment Completed With: Other: Daughter, Juju Barroso received per patient?  written  HIPAA Verified?   Yes    Discharge Dx:  Daniel PAREKH (three) times daily for 7 days. 21 tablet 0   • nystatin 080911 UNIT/ML Mouth/Throat Suspension Take 5 mL (500,000 Units total) by mouth 4 (four) times daily for 14 days.  280 mL 0   • ALLOPURINOL 300 MG Oral Tab Take 1 tablet by mouth once daily 90 tablet any needs or concerns you need addressed before your next visit with your PCP?  (DME, meds, disease concerns, Etc): No     Follow up appointments:      Your appointments     Date & Time Appointment Department [facility])    Nov 11, 2020  1:30 PM [facility] patient's fevers has resolved. She was educated and advised if patient should experience similar symptoms to what brought him in to the hospital, he is to return to the ER or call 911. She understands and denies similar symptoms at time of call.   Nile File

## 2020-11-09 NOTE — TELEPHONE ENCOUNTER
[facility] nurse would like to let Dr know that patient home care order open today.        Patient will have nurse and also PT/OT

## 2020-11-10 NOTE — PROGRESS NOTES
[address] [place] [address]  Patient Status:  Outpatient    [id number] MRN [id number]   Location [place] MD Dr. Debbi Small is a 80year old male who presents to clinic for rec 210.0 11/06/2020 08:19 AM    CREATSERUM 1.30 11/06/2020 08:19 AM    CREATSERUM 1.31 (H) 11/06/2020 08:19 AM    BUN 21 (H) 11/06/2020 08:19 AM    BUN 21 (H) 11/06/2020 08:19 AM     11/06/2020 08:19 AM     11/06/2020 08:19 AM    K 3.9 11/06/2020 AFib HR 63  3/28/19 Afib RBBB HR 61    ECHO  3/29/19 EF 55-60%, no WMA, mod-sev AS, max aortic velocity 2.66m/sec, peak gradient 28mmHg valve area max 0.96       Education:  Patient and family instructed at length regarding clinic procedures, hours, purpos some cereal, vegetable juice, canned vegetables, lunch meats, processed meats like hotdogs, sausage, toussaint, pepperoni, soy sauce, pre-packaged rice or potatoes. Please remember to read nutrition labels for sodium content.     [email or web address] nhlbi.nih.go

## 2020-11-11 ENCOUNTER — OFFICE VISIT (OUTPATIENT)
Dept: CARDIOLOGY CLINIC | Facility: HOSPITAL | Age: 85
End: 2020-11-11
Attending: NURSE PRACTITIONER
Payer: MEDICARE

## 2020-11-11 VITALS
BODY MASS INDEX: 23 KG/M2 | HEART RATE: 62 BPM | WEIGHT: 166 LBS | OXYGEN SATURATION: 100 % | SYSTOLIC BLOOD PRESSURE: 131 MMHG | DIASTOLIC BLOOD PRESSURE: 65 MMHG

## 2020-11-11 DIAGNOSIS — K81.0 ACUTE CHOLECYSTITIS: ICD-10-CM

## 2020-11-11 DIAGNOSIS — J69.0 ASPIRATION PNEUMONIA, UNSPECIFIED ASPIRATION PNEUMONIA TYPE, UNSPECIFIED LATERALITY, UNSPECIFIED PART OF LUNG (HCC): Primary | ICD-10-CM

## 2020-11-11 DIAGNOSIS — I48.92 ATRIAL FIBRILLATION AND FLUTTER (HCC): ICD-10-CM

## 2020-11-11 DIAGNOSIS — I48.91 ATRIAL FIBRILLATION AND FLUTTER (HCC): ICD-10-CM

## 2020-11-11 PROCEDURE — 99214 OFFICE O/P EST MOD 30 MIN: CPT | Performed by: NURSE PRACTITIONER

## 2020-11-11 PROCEDURE — 99212 OFFICE O/P EST SF 10 MIN: CPT | Performed by: NURSE PRACTITIONER

## 2020-11-11 NOTE — PATIENT INSTRUCTIONS
Continue current medications    Return to [address] [place] [address] as needed or directed    Follow up with Mechelle Fitzgerald    Follow up with Dr. Nikik Cabrera on 11/17    Please call the heart failure clinic if you notice a weight gain of 3 pounds overnight or 5 po

## 2020-11-12 ENCOUNTER — LAB REQUISITION (OUTPATIENT)
Dept: LAB | Facility: HOSPITAL | Age: 85
End: 2020-11-12
Payer: MEDICARE

## 2020-11-12 DIAGNOSIS — I10 ESSENTIAL (PRIMARY) HYPERTENSION: ICD-10-CM

## 2020-11-12 DIAGNOSIS — K81.0 ACUTE CHOLECYSTITIS: ICD-10-CM

## 2020-11-12 PROCEDURE — 82248 BILIRUBIN DIRECT: CPT | Performed by: INTERNAL MEDICINE

## 2020-11-12 PROCEDURE — 84075 ASSAY ALKALINE PHOSPHATASE: CPT | Performed by: INTERNAL MEDICINE

## 2020-11-12 PROCEDURE — 80053 COMPREHEN METABOLIC PANEL: CPT | Performed by: INTERNAL MEDICINE

## 2020-11-17 ENCOUNTER — E-ADVICE (OUTPATIENT)
Dept: CARDIOLOGY | Age: 85
End: 2020-11-17

## 2020-11-17 ENCOUNTER — OFFICE VISIT (OUTPATIENT)
Dept: INTERNAL MEDICINE CLINIC | Facility: CLINIC | Age: 85
End: 2020-11-17
Payer: MEDICARE

## 2020-11-17 ENCOUNTER — PATIENT MESSAGE (OUTPATIENT)
Dept: GASTROENTEROLOGY | Facility: CLINIC | Age: 85
End: 2020-11-17

## 2020-11-17 VITALS
DIASTOLIC BLOOD PRESSURE: 76 MMHG | HEART RATE: 80 BPM | WEIGHT: 163 LBS | BODY MASS INDEX: 22.82 KG/M2 | HEIGHT: 71 IN | SYSTOLIC BLOOD PRESSURE: 124 MMHG | RESPIRATION RATE: 16 BRPM

## 2020-11-17 DIAGNOSIS — E78.2 MIXED HYPERLIPIDEMIA: ICD-10-CM

## 2020-11-17 DIAGNOSIS — K83.09 ASCENDING CHOLANGITIS: Primary | ICD-10-CM

## 2020-11-17 DIAGNOSIS — I10 ESSENTIAL HYPERTENSION WITH GOAL BLOOD PRESSURE LESS THAN 140/90: ICD-10-CM

## 2020-11-17 PROCEDURE — 99495 TRANSJ CARE MGMT MOD F2F 14D: CPT | Performed by: INTERNAL MEDICINE

## 2020-11-17 RX ORDER — FUROSEMIDE 20 MG/1
20 TABLET ORAL DAILY PRN
Qty: 90 TABLET | Refills: 1 | COMMUNITY
Start: 2020-11-17 | End: 2021-09-21

## 2020-11-17 RX ORDER — TAMSULOSIN HYDROCHLORIDE 0.4 MG/1
0.4 CAPSULE ORAL EVERY EVENING
Qty: 90 CAPSULE | Refills: 1 | COMMUNITY
Start: 2020-11-17 | End: 2020-12-14

## 2020-11-17 NOTE — TELEPHONE ENCOUNTER
Charli Chou    I placed the lab results received from [facility] on your desk for review. Did you want the patient to complete any more workup before his appointment on 12/3/2020?     Thank you

## 2020-11-17 NOTE — PROGRESS NOTES
HPI:    Lucio Avendano is a 80year old male here today for hospital follow up.    He was discharged from Inpatient hospital, [facility]  to Home   Admission Date: 10/31/20   Discharge Date: 11/6/20  Hospital Discharge Diagnoses (since 10/18/2020) patient was found to have slight tenderness in the right side and the patient was taken for a CT scan of the abdomen and pelvis, which revealed a cirrhotic liver and moderate size hiatal hernia, large amount of excessive stool to suggest constipation, dive reconciled and reviewed with patient  He  has a past medical history of Atrial fibrillation ([address] BCC (basal cell carcinoma) (2019), Bilateral inguinal hernia, Cancer of kidney ([address] (2005), Cholecystitis (2013), Cirrhosis, cryptogenic ([facility] (2013), High bleeding  ENDOCRINE: denies thyroid history  ALL/ASTHMA: denies hx of allergy or asthma    PHYSICAL EXAM:   No LMP for male patient.   Estimated body mass index is 22.73 kg/m² as calculated from the following:    Height as of this encounter: 5' 11\" (1.803 reductions in dietary total and saturated fat and eating a diet rich in fruits and vegetables. Discussed decreasing alcohol consumption Try to exercise at least 3 times weekly to build strength, burn calories and help to achieve ideal body weight.     No or

## 2020-11-17 NOTE — TELEPHONE ENCOUNTER
Lab work noted. As this is already available in epic, I will discard paper copies. Nursing: No additional orders at this time.   Provided the patient is stable and has no acute symptoms, we will evaluate him in office and provide additional orders at th

## 2020-11-19 NOTE — PROGRESS NOTES
[address] [facility] Follow-up Visit    Myrna were consulted. IR recommended observation as the gallbladder was contracted and not clearly inflamed. Bilirubin/LFTs improving. Patient is considered a poor surgical candidate.     Infectious disease was also consulted with the patient treated with IV a carcinoma) 2019    right ear pinna   • Bilateral inguinal hernia    • Cancer ([facility] 2010, 2019, 2020    partial nephrectomy/skin cancers   • Cancer of kidney ([facility] 2005   • Cholecystitis 2013   • Cirrhosis, cryptogenic ([facility] 2013   • Essential hypertension beer - 3 beers occasionally, NOT ANYMORE ACCORDING TO DAUGHTER    Drug use: No       Medications (Active prior to today's visit):  Current Outpatient Medications   Medication Sig Dispense Refill   • mupirocin 2 % External Ointment Apply a small amount to t swelling  BEHAVIOR/PSYCH:  negative for depressed mood    PHYSICAL EXAM:   Blood pressure 129/69, pulse 62, height 5' 11\" (1.803 m), weight 166 lb (75.3 kg).     Gen: patient appears comfortable and in no acute distress, uses a walker  HEENT: conjunctiva p this had been discussed with the patient and the patient ultimately elected to defer on further [address] screening. We again discussed this in the office with the patient to consider his options.   If he chooses to continue [address] screening would recommend liver u

## 2020-11-23 ENCOUNTER — PATIENT MESSAGE (OUTPATIENT)
Dept: PODIATRY CLINIC | Facility: CLINIC | Age: 85
End: 2020-11-23

## 2020-11-23 ENCOUNTER — TELEPHONE (OUTPATIENT)
Dept: PODIATRY CLINIC | Facility: CLINIC | Age: 85
End: 2020-11-23

## 2020-11-23 NOTE — TELEPHONE ENCOUNTER
S/w pt daughter and she states on 11/22 she noticed left great toe lateral border is red, swollen, and tender to touch. She denies any d/c. She states pt is not diabetic and no PVD.  She states she soaked pt foot in warm water and epsom salt and that helped

## 2020-11-23 NOTE — TELEPHONE ENCOUNTER
Daughter calling states pt thinks he has a ingrown toe nail states toe very swollen very red will send pictures through [address]  Said soaked for relief and cleaned the area  please advise

## 2020-11-23 NOTE — TELEPHONE ENCOUNTER
From: Lucio Avendano  To: Silvia Mojica DPM  Sent: 11/23/2020 2:41 PM CST  Subject: Other    Pictures from this morning - remains very tender on the inside edge and top of toe.

## 2020-11-24 NOTE — TELEPHONE ENCOUNTER
S/w pt daughter and appt made on 11/27 @ [facility] at 12pm. She will continue to soak his feet as that has helped some. If any symptoms worsen or he develops fever, chills d/c he will go to  . She will CB as needed.

## 2020-11-27 ENCOUNTER — OFFICE VISIT (OUTPATIENT)
Dept: PODIATRY CLINIC | Facility: CLINIC | Age: 85
End: 2020-11-27
Payer: MEDICARE

## 2020-11-27 VITALS — DIASTOLIC BLOOD PRESSURE: 68 MMHG | SYSTOLIC BLOOD PRESSURE: 124 MMHG

## 2020-11-27 DIAGNOSIS — M79.675 PAIN OF TOE OF LEFT FOOT: ICD-10-CM

## 2020-11-27 DIAGNOSIS — L03.032 PARONYCHIA OF TOE OF LEFT FOOT: Primary | ICD-10-CM

## 2020-11-27 DIAGNOSIS — L60.0 ONYCHOCRYPTOSIS: ICD-10-CM

## 2020-11-27 DIAGNOSIS — B35.1 ONYCHOMYCOSIS: ICD-10-CM

## 2020-11-27 PROCEDURE — 11765 WEDGE EXCISION SKN NAIL FOLD: CPT | Performed by: PODIATRIST

## 2020-11-27 RX ORDER — CEFADROXIL 500 MG/1
500 CAPSULE ORAL 2 TIMES DAILY
Qty: 14 CAPSULE | Refills: 0 | Status: SHIPPED | OUTPATIENT
Start: 2020-11-27 | End: 2020-12-08 | Stop reason: ALTCHOICE

## 2020-11-27 RX ORDER — CEFADROXIL 500 MG/1
500 CAPSULE ORAL 2 TIMES DAILY
Qty: 14 CAPSULE | Refills: 0 | Status: SHIPPED | OUTPATIENT
Start: 2020-11-27 | End: 2020-11-27 | Stop reason: CLARIF

## 2020-11-27 NOTE — PROCEDURES
Verbal order given by Dr Maxx Ramirez to draw up 5mls of Sensorcaine 0.5% for incision and drainage of lateral border of left hallux. I was unable to obtain post injection vitals.

## 2020-11-28 ENCOUNTER — TELEPHONE (OUTPATIENT)
Dept: INTERNAL MEDICINE CLINIC | Facility: CLINIC | Age: 85
End: 2020-11-28

## 2020-11-28 NOTE — TELEPHONE ENCOUNTER
Spoke with pt's daughter Allan Blunt), pt  verified, she stated Dr Alvaro Maynard saw pt yesterday for infection for ingrown left big toe, he had this since last    Dr Alvaro Maynard (Podiatrist) cut the toe nail and given direction how to proceed on healing and wa

## 2020-11-29 NOTE — PROGRESS NOTES
Vickie Plaza is a 80year old male. Patient presents with:  New Patient: ingrown nail, left hallux - patient has been soaking the toe with epsom salt - pain scale to the touch 10/10.         HPI:   This pleasant gentleman presents to the clinic his daughte (basal cell carcinoma) 2019    right ear pinna   • Bilateral inguinal hernia    • Cancer of kidney ([address] 2005   • Cholecystitis 2013   • Cirrhosis, cryptogenic ([address] 2013   • High blood pressure    • High cholesterol    • Hip arthritis 2000   • Hyperlipide beer - 3 beers occasionally, NOT ANYMORE ACCORDING TO DAUGHTER      Drug use: No    Other Topics      Concerns:        Caffeine Concern: Yes          coffee, 2 cups        Reaction to local anesthetic: No          REVIEW OF SYSTEMS:   Today reviewed systen from lateral left hallux  Post procedure diagnosis: Same  Procedure: Incision and drainage of paronychia lateral left hallux    Technique: Appropriate timeout was taken.   The left hallux was anesthetized with 5 cc of 0.5% Marcaine plain then prepped and dr

## 2020-11-30 NOTE — TELEPHONE ENCOUNTER
Go ahead and proceed taking the antibiotics as directed.   The infection has to clear up sometime and antibiotics are the way to do it

## 2020-11-30 NOTE — TELEPHONE ENCOUNTER
Spoke with daughter Jaxon Tierney (MICKEY and  verified) and relayed Dr. Kemi Alanis's message below--she verbalizes understanding and agreement. No further questions/concerns at this time.

## 2020-12-02 ENCOUNTER — OFFICE VISIT (OUTPATIENT)
Dept: PODIATRY CLINIC | Facility: CLINIC | Age: 85
End: 2020-12-02
Payer: MEDICARE

## 2020-12-02 VITALS — WEIGHT: 163 LBS | BODY MASS INDEX: 22.82 KG/M2 | HEIGHT: 71 IN

## 2020-12-02 DIAGNOSIS — L03.032 PARONYCHIA OF TOE OF LEFT FOOT: Primary | ICD-10-CM

## 2020-12-02 PROCEDURE — 99024 POSTOP FOLLOW-UP VISIT: CPT | Performed by: PODIATRIST

## 2020-12-02 PROCEDURE — G0463 HOSPITAL OUTPT CLINIC VISIT: HCPCS | Performed by: PODIATRIST

## 2020-12-02 NOTE — PROGRESS NOTES
HPI:    Patient ID: Daija Maurer is a 80year old male. 80-year-old male presents 5 days post incision and drainage of the left great toe. He states all of his pain is gone. He is taking antibiotics and soaking twice per day.       ROS:              Cur week         ASSESSMENT/PLAN:   Paronychia of toe of left foot  (primary encounter diagnosis)    No orders of the defined types were placed in this encounter.       Meds This Visit:  Requested Prescriptions      No prescriptions requested or ordered in this

## 2020-12-03 ENCOUNTER — OFFICE VISIT (OUTPATIENT)
Dept: GASTROENTEROLOGY | Facility: CLINIC | Age: 85
End: 2020-12-03
Payer: MEDICARE

## 2020-12-03 VITALS
SYSTOLIC BLOOD PRESSURE: 129 MMHG | HEART RATE: 62 BPM | WEIGHT: 166 LBS | HEIGHT: 71 IN | BODY MASS INDEX: 23.24 KG/M2 | DIASTOLIC BLOOD PRESSURE: 69 MMHG

## 2020-12-03 DIAGNOSIS — R79.89 ELEVATED LFTS: Primary | ICD-10-CM

## 2020-12-03 DIAGNOSIS — K70.30 ALCOHOLIC CIRRHOSIS OF LIVER WITHOUT ASCITES (HCC): ICD-10-CM

## 2020-12-03 PROCEDURE — G0463 HOSPITAL OUTPT CLINIC VISIT: HCPCS | Performed by: NURSE PRACTITIONER

## 2020-12-03 PROCEDURE — 99215 OFFICE O/P EST HI 40 MIN: CPT | Performed by: NURSE PRACTITIONER

## 2020-12-03 NOTE — PATIENT INSTRUCTIONS
-Repeat blood work in 6 weeks  -Consider screening every 6 months with blood work and labs vs cautious observation  -Follow-up with new or recurrent GI issues

## 2020-12-07 ENCOUNTER — TELEPHONE (OUTPATIENT)
Dept: GASTROENTEROLOGY | Facility: CLINIC | Age: 85
End: 2020-12-07

## 2020-12-07 RX ORDER — OMEPRAZOLE 20 MG/1
CAPSULE, DELAYED RELEASE ORAL
Qty: 90 CAPSULE | Refills: 0 | Status: SHIPPED | OUTPATIENT
Start: 2020-12-07 | End: 2021-03-03

## 2020-12-08 ENCOUNTER — OFFICE VISIT (OUTPATIENT)
Dept: PODIATRY CLINIC | Facility: CLINIC | Age: 85
End: 2020-12-08
Payer: MEDICARE

## 2020-12-08 DIAGNOSIS — M79.675 PAIN IN TOES OF BOTH FEET: Primary | ICD-10-CM

## 2020-12-08 DIAGNOSIS — M79.674 PAIN IN TOES OF BOTH FEET: Primary | ICD-10-CM

## 2020-12-08 DIAGNOSIS — B35.1 ONYCHOMYCOSIS: ICD-10-CM

## 2020-12-08 PROCEDURE — 11721 DEBRIDE NAIL 6 OR MORE: CPT | Performed by: PODIATRIST

## 2020-12-08 NOTE — PROGRESS NOTES
HPI:    Patient ID: Refugio Martinez is a 80year old male. This 25-year-old male presents for 2 reasons. The first is in reference to the previous ingrown toenail on the left hallux. He reports no further concerns or problems.   The second is in referenc infection. Careful and complete debridement of all 10 toenails was accomplished today without incident. I reduced nail, subungual debris, and some keratosis. No ulcerations or infections were noted upon debridement.   I anticipate relief and will see krista

## 2020-12-14 RX ORDER — TAMSULOSIN HYDROCHLORIDE 0.4 MG/1
CAPSULE ORAL
Qty: 90 CAPSULE | Refills: 0 | Status: SHIPPED | OUTPATIENT
Start: 2020-12-14 | End: 2021-03-18

## 2020-12-14 RX ORDER — ALLOPURINOL 300 MG/1
TABLET ORAL
Qty: 90 TABLET | Refills: 0 | Status: SHIPPED | OUTPATIENT
Start: 2020-12-14 | End: 2021-03-18

## 2020-12-17 RX ORDER — LOVASTATIN 40 MG/1
40 TABLET ORAL NIGHTLY
Qty: 90 TABLET | Refills: 1 | Status: SHIPPED | OUTPATIENT
Start: 2020-12-17 | End: 2021-06-14 | Stop reason: SDUPTHER

## 2020-12-17 RX ORDER — LOVASTATIN 40 MG/1
40 TABLET ORAL NIGHTLY
Qty: 90 TABLET | Refills: 1 | Status: SHIPPED | OUTPATIENT
Start: 2020-12-17 | End: 2020-12-17 | Stop reason: SDUPTHER

## 2020-12-29 ENCOUNTER — OFFICE VISIT (OUTPATIENT)
Dept: INTERNAL MEDICINE CLINIC | Facility: CLINIC | Age: 85
End: 2020-12-29
Payer: MEDICARE

## 2020-12-29 VITALS
RESPIRATION RATE: 16 BRPM | SYSTOLIC BLOOD PRESSURE: 144 MMHG | WEIGHT: 168 LBS | HEIGHT: 71 IN | BODY MASS INDEX: 23.52 KG/M2 | HEART RATE: 73 BPM | DIASTOLIC BLOOD PRESSURE: 75 MMHG

## 2020-12-29 DIAGNOSIS — L98.9 SKIN LESION OF RIGHT ARM: ICD-10-CM

## 2020-12-29 DIAGNOSIS — I25.119 ATHEROSCLEROSIS OF NATIVE CORONARY ARTERY OF NATIVE HEART WITH ANGINA PECTORIS (HCC): ICD-10-CM

## 2020-12-29 DIAGNOSIS — I10 ESSENTIAL HYPERTENSION WITH GOAL BLOOD PRESSURE LESS THAN 140/90: Primary | ICD-10-CM

## 2020-12-29 DIAGNOSIS — D69.6 THROMBOCYTOPENIA (HCC): ICD-10-CM

## 2020-12-29 DIAGNOSIS — K83.09 ASCENDING CHOLANGITIS: ICD-10-CM

## 2020-12-29 PROCEDURE — G0463 HOSPITAL OUTPT CLINIC VISIT: HCPCS | Performed by: INTERNAL MEDICINE

## 2020-12-29 PROCEDURE — 99214 OFFICE O/P EST MOD 30 MIN: CPT | Performed by: INTERNAL MEDICINE

## 2020-12-29 NOTE — PROGRESS NOTES
Patient ID: Ayana Medrano is a 80year old male. HISTORY OF PRESENT ILLNESS:   1.  Essential hypertension with goal blood pressure less than 140/90    Patient has been following low salt diet and has been taking anti-hypertensive prescriptions as prescri hernia    • Cancer ([facility] 2010, 2019, 2020    partial nephrectomy/skin cancers   • Cancer of kidney ([facility] 2005   • Cholecystitis 2013   • Cirrhosis, cryptogenic ([facility] 2013   • Essential hypertension     controlled w/medication   • Heart disease     aortic s daily with lunch.  , Disp: , Rfl:   •  Multiple Vitamins-Minerals (CENTRUM SILVER ULTRA MENS) Oral Tab, Take 1 tablet by mouth daily.   , Disp: , Rfl:   •  Lovastatin 40 MG Oral Tab, Take 40 mg by mouth nightly.  , Disp: , Rfl:   •  lidocaine-menthol 4-1 % ex partner: Not on file        Emotionally abused: Not on file        Physically abused: Not on file        Forced sexual activity: Not on file    Other Topics      Concerns:         Service: Not Asked        Blood Transfusions: Not Asked        Ca on right upper arm that will need to be seen by dermatology. Refer to Dr Emery Domínguez. 5. Ascending Cholangitis    Currently with no GI symptoms, fever, or nausea. Will repeat enzymes as ordered by GI.     Miguel Sauer MD  5/19/2020

## 2021-01-07 ENCOUNTER — LAB ENCOUNTER (OUTPATIENT)
Dept: LAB | Age: 86
End: 2021-01-07
Attending: NURSE PRACTITIONER
Payer: MEDICARE

## 2021-01-07 DIAGNOSIS — R74.8 ALKALINE PHOSPHATASE ELEVATION: Primary | ICD-10-CM

## 2021-01-07 DIAGNOSIS — K70.30 ALCOHOLIC CIRRHOSIS OF LIVER WITHOUT ASCITES (HCC): ICD-10-CM

## 2021-01-07 DIAGNOSIS — R79.89 ELEVATED LFTS: ICD-10-CM

## 2021-01-07 LAB
ALBUMIN SERPL-MCNC: 3 G/DL
ALBUMIN SERPL-MCNC: 3 G/DL (ref 3.4–5)
ALBUMIN/GLOB SERPL: 0.7 {RATIO}
ALBUMIN/GLOB SERPL: 0.7 {RATIO} (ref 1–2)
ALP LIVER SERPL-CCNC: 139 U/L
ALP SERPL-CCNC: 139 U/L
ALT SERPL-CCNC: 25 U/L
ALT SERPL-CCNC: 25 UNITS/L
ANION GAP SERPL CALC-SCNC: 2 MMOL/L
ANION GAP SERPL CALC-SCNC: 2 MMOL/L (ref 0–18)
AST SERPL-CCNC: 32 U/L (ref 15–37)
AST SERPL-CCNC: 32 UNITS/L
BILIRUB SERPL-MCNC: 1 MG/DL
BILIRUB SERPL-MCNC: 1 MG/DL (ref 0.1–2)
BUN BLD-MCNC: 18 MG/DL (ref 7–18)
BUN SERPL-MCNC: 18 MG/DL
BUN/CREAT SERPL: 16.1
BUN/CREAT SERPL: 16.1 (ref 10–20)
CALCIUM BLD-MCNC: 9.7 MG/DL (ref 8.5–10.1)
CALCIUM SERPL-MCNC: 9.7 MG/DL
CHLORIDE SERPL-SCNC: 107 MMOL/L
CHLORIDE SERPL-SCNC: 107 MMOL/L (ref 98–112)
CO2 SERPL-SCNC: 32 MMOL/L
CO2 SERPL-SCNC: 32 MMOL/L (ref 21–32)
CREAT BLD-MCNC: 1.12 MG/DL
CREAT SERPL-MCNC: 1.12 MG/DL
GLOBULIN PLAS-MCNC: 4.5 G/DL (ref 2.8–4.4)
GLOBULIN SER-MCNC: 4.5 G/DL
GLUCOSE BLD-MCNC: 85 MG/DL (ref 70–99)
GLUCOSE SERPL-MCNC: 85 MG/DL
M PROTEIN MFR SERPL ELPH: 7.5 G/DL (ref 6.4–8.2)
OSMOLALITY SERPL CALC.SUM OF ELEC: 293 MOSM/KG (ref 275–295)
PATIENT FASTING Y/N/NP: YES
POTASSIUM SERPL-SCNC: 4.8 MMOL/L
POTASSIUM SERPL-SCNC: 4.8 MMOL/L (ref 3.5–5.1)
PROT SERPL-MCNC: 7.5 G/DL
SODIUM SERPL-SCNC: 141 MMOL/L
SODIUM SERPL-SCNC: 141 MMOL/L (ref 136–145)

## 2021-01-07 PROCEDURE — 36415 COLL VENOUS BLD VENIPUNCTURE: CPT

## 2021-01-07 PROCEDURE — 80053 COMPREHEN METABOLIC PANEL: CPT

## 2021-01-18 ENCOUNTER — MED REC SCAN ONLY (OUTPATIENT)
Dept: INTERNAL MEDICINE CLINIC | Facility: CLINIC | Age: 86
End: 2021-01-18

## 2021-01-21 ENCOUNTER — ANCILLARY PROCEDURE (OUTPATIENT)
Dept: CARDIOLOGY | Age: 86
End: 2021-01-21
Attending: INTERNAL MEDICINE

## 2021-01-21 DIAGNOSIS — I35.0 NONRHEUMATIC AORTIC VALVE STENOSIS: ICD-10-CM

## 2021-01-21 PROCEDURE — 93306 TTE W/DOPPLER COMPLETE: CPT | Performed by: INTERNAL MEDICINE

## 2021-01-26 ENCOUNTER — OFFICE VISIT (OUTPATIENT)
Dept: CARDIOLOGY | Age: 86
End: 2021-01-26

## 2021-01-26 VITALS
HEIGHT: 71 IN | WEIGHT: 168 LBS | HEART RATE: 54 BPM | OXYGEN SATURATION: 98 % | BODY MASS INDEX: 23.52 KG/M2 | DIASTOLIC BLOOD PRESSURE: 56 MMHG | SYSTOLIC BLOOD PRESSURE: 138 MMHG

## 2021-01-26 DIAGNOSIS — I48.19 PERSISTENT ATRIAL FIBRILLATION (CMD): Primary | ICD-10-CM

## 2021-01-26 DIAGNOSIS — I35.0 NONRHEUMATIC AORTIC VALVE STENOSIS: ICD-10-CM

## 2021-01-26 PROCEDURE — 99214 OFFICE O/P EST MOD 30 MIN: CPT | Performed by: INTERNAL MEDICINE

## 2021-01-26 ASSESSMENT — PATIENT HEALTH QUESTIONNAIRE - PHQ9
CLINICAL INTERPRETATION OF PHQ9 SCORE: NO FURTHER SCREENING NEEDED
SUM OF ALL RESPONSES TO PHQ9 QUESTIONS 1 AND 2: 0
2. FEELING DOWN, DEPRESSED OR HOPELESS: NOT AT ALL
1. LITTLE INTEREST OR PLEASURE IN DOING THINGS: NOT AT ALL
SUM OF ALL RESPONSES TO PHQ9 QUESTIONS 1 AND 2: 0
CLINICAL INTERPRETATION OF PHQ2 SCORE: NO FURTHER SCREENING NEEDED

## 2021-02-15 ENCOUNTER — TELEPHONE (OUTPATIENT)
Dept: INTERNAL MEDICINE CLINIC | Facility: CLINIC | Age: 86
End: 2021-02-15

## 2021-02-15 ENCOUNTER — PATIENT MESSAGE (OUTPATIENT)
Dept: INTERNAL MEDICINE CLINIC | Facility: CLINIC | Age: 86
End: 2021-02-15

## 2021-02-15 ENCOUNTER — OFFICE VISIT (OUTPATIENT)
Dept: INTERNAL MEDICINE CLINIC | Facility: CLINIC | Age: 86
End: 2021-02-15
Payer: MEDICARE

## 2021-02-15 ENCOUNTER — NURSE TRIAGE (OUTPATIENT)
Dept: INTERNAL MEDICINE CLINIC | Facility: CLINIC | Age: 86
End: 2021-02-15

## 2021-02-15 VITALS
BODY MASS INDEX: 24.36 KG/M2 | DIASTOLIC BLOOD PRESSURE: 78 MMHG | RESPIRATION RATE: 16 BRPM | SYSTOLIC BLOOD PRESSURE: 190 MMHG | WEIGHT: 174 LBS | HEART RATE: 57 BPM | HEIGHT: 71 IN

## 2021-02-15 DIAGNOSIS — E78.2 MIXED HYPERLIPIDEMIA: ICD-10-CM

## 2021-02-15 DIAGNOSIS — I10 ESSENTIAL HYPERTENSION WITH GOAL BLOOD PRESSURE LESS THAN 140/90: ICD-10-CM

## 2021-02-15 DIAGNOSIS — R10.32 LEFT LOWER QUADRANT ABDOMINAL PAIN: Primary | ICD-10-CM

## 2021-02-15 PROCEDURE — 99214 OFFICE O/P EST MOD 30 MIN: CPT | Performed by: INTERNAL MEDICINE

## 2021-02-15 NOTE — TELEPHONE ENCOUNTER
Valeria sent. TE created. Transfer to triage.      From: Timothy Mead  To: Kylee Ba MD  Sent: 2/15/2021 11:46 AM CST  Subject: Other    Morning - I'm sending this picture for your reference and calling your office - Since Saturday afternoon 2/1

## 2021-02-15 NOTE — TELEPHONE ENCOUNTER
Action Requested: Summary for Provider     []  Critical Lab, Recommendations Needed  [] Need Additional Advice  [x]   FYI    []   Need Orders  [] Need Medications Sent to Pharmacy  []  Other     SUMMARY: Daughter calling with complaint of patient having le

## 2021-02-15 NOTE — TELEPHONE ENCOUNTER
From: Sarah Garrido  To: Payton Silveira MD  Sent: 2/15/2021 11:46 AM CST  Subject: Other    Morning - I'm sending this picture for your reference and calling your office - Since Saturday afternoon 2/13, dad's having discomfort on his left front stoma

## 2021-02-15 NOTE — TELEPHONE ENCOUNTER
Spoke to pt daughter and advised ok for pt to come in at 4:10. Pt daughter agrees to plan. Pt added to Dr Joana Alanis's schedule.

## 2021-02-15 NOTE — PROGRESS NOTES
Patient ID: David Yeboah is a 80year old male. HISTORY OF PRESENT ILLNESS:   1. Left lower quadrant pain    Has been having LLQ pain the last 48 hours. The pain is worse with movement and he feels fione when he sits still. Appetite has been good.  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  , Disp: , Rfl:     Allergies:No Known Allergies    Social History    Socioeconomic History      Marital status:        Spouse name: Not on file      Number of children: 3      Years of education: Not on f Occupational Exposure: Not Asked        Hobby Hazards: Not Asked        Sleep Concern: Not Asked        Stress Concern: Not Asked        Weight Concern: Not Asked        Special Diet: Not Asked        Back Care: Not Asked        Exercise: Not Asked is usually sharp with that nerve pain symptoms sound like what he has watch that area make sure he does not get a rash forget about it urine got no fat Alex-Verduzco the skin with normal meal today nothing to spicy you know more liquids than usual stay hyd

## 2021-02-16 ENCOUNTER — TELEPHONE (OUTPATIENT)
Dept: FAMILY MEDICINE CLINIC | Facility: CLINIC | Age: 86
End: 2021-02-16

## 2021-02-16 NOTE — TELEPHONE ENCOUNTER
Verified name and  of patient. Daughter of patient (on MICKEY) calling with condition update for patient. Patient was seen in office 02/15/21. She states that he has been feeling better today.  She states that he has been drinking fluids and only had one

## 2021-02-18 ENCOUNTER — LAB ENCOUNTER (OUTPATIENT)
Dept: LAB | Age: 86
End: 2021-02-18
Attending: INTERNAL MEDICINE
Payer: MEDICARE

## 2021-02-18 DIAGNOSIS — R10.32 LEFT LOWER QUADRANT ABDOMINAL PAIN: Primary | ICD-10-CM

## 2021-02-18 DIAGNOSIS — R10.32 LEFT LOWER QUADRANT ABDOMINAL PAIN: ICD-10-CM

## 2021-02-18 LAB
BASOPHILS # BLD AUTO: 0.05 X10(3) UL (ref 0–0.2)
BASOPHILS NFR BLD AUTO: 0.7 %
BILIRUB UR QL: NEGATIVE
CLARITY UR: CLEAR
COLOR UR: YELLOW
DEPRECATED RDW RBC AUTO: 52.9 FL (ref 35.1–46.3)
EOSINOPHIL # BLD AUTO: 0.3 X10(3) UL (ref 0–0.7)
EOSINOPHIL NFR BLD AUTO: 4 %
ERYTHROCYTE [DISTWIDTH] IN BLOOD BY AUTOMATED COUNT: 14.1 % (ref 11–15)
GLUCOSE UR-MCNC: NEGATIVE MG/DL
HCT VFR BLD AUTO: 44.1 %
HGB BLD-MCNC: 14.3 G/DL
HGB UR QL STRIP.AUTO: NEGATIVE
IMM GRANULOCYTES # BLD AUTO: 0.01 X10(3) UL (ref 0–1)
IMM GRANULOCYTES NFR BLD: 0.1 %
KETONES UR-MCNC: NEGATIVE MG/DL
LEUKOCYTE ESTERASE UR QL STRIP.AUTO: NEGATIVE
LYMPHOCYTES # BLD AUTO: 2.25 X10(3) UL (ref 1–4)
LYMPHOCYTES NFR BLD AUTO: 29.9 %
MCH RBC QN AUTO: 32.9 PG (ref 26–34)
MCHC RBC AUTO-ENTMCNC: 32.4 G/DL (ref 31–37)
MCV RBC AUTO: 101.4 FL
MONOCYTES # BLD AUTO: 0.9 X10(3) UL (ref 0.1–1)
MONOCYTES NFR BLD AUTO: 12 %
NEUTROPHILS # BLD AUTO: 4.02 X10 (3) UL (ref 1.5–7.7)
NEUTROPHILS # BLD AUTO: 4.02 X10(3) UL (ref 1.5–7.7)
NEUTROPHILS NFR BLD AUTO: 53.3 %
NITRITE UR QL STRIP.AUTO: NEGATIVE
PH UR: 7 [PH] (ref 5–8)
PLATELET # BLD AUTO: 114 10(3)UL (ref 150–450)
PROT UR-MCNC: NEGATIVE MG/DL
RBC # BLD AUTO: 4.35 X10(6)UL
SP GR UR STRIP: 1.01 (ref 1–1.03)
UROBILINOGEN UR STRIP-ACNC: 4
WBC # BLD AUTO: 7.5 X10(3) UL (ref 4–11)

## 2021-02-18 PROCEDURE — 81003 URINALYSIS AUTO W/O SCOPE: CPT | Performed by: INTERNAL MEDICINE

## 2021-02-18 PROCEDURE — 36415 COLL VENOUS BLD VENIPUNCTURE: CPT

## 2021-02-18 PROCEDURE — 85025 COMPLETE CBC W/AUTO DIFF WBC: CPT

## 2021-02-18 NOTE — TELEPHONE ENCOUNTER
Spoke with daughter Aspen Bobo ( verified)--reports patient, Hood Rout a big bowel movement yesterday without the Miralax and Monday, so he's going. He take fiber gummies every day, which helps.  But my dad is still complaining of the left lower quadrant pain and

## 2021-02-18 NOTE — TELEPHONE ENCOUNTER
Called daughter did have a bowel movement still has some left lower quadrant discomfort but no fever or chills.   We will order a CBC and a urinalysis to rule out infection and will follow-up if pain persists

## 2021-02-19 ENCOUNTER — TELEPHONE (OUTPATIENT)
Dept: GASTROENTEROLOGY | Facility: CLINIC | Age: 86
End: 2021-02-19

## 2021-02-19 ENCOUNTER — PATIENT MESSAGE (OUTPATIENT)
Dept: GASTROENTEROLOGY | Facility: CLINIC | Age: 86
End: 2021-02-19

## 2021-02-19 NOTE — TELEPHONE ENCOUNTER
Van Bell-    See patient's message below. Do you want to see this patient in the office/schedule for televisit?

## 2021-02-19 NOTE — TELEPHONE ENCOUNTER
Daughter states for the past week pt has been experiencing lower left quadrant abdominal pain. Daughter state pain is a 6 on the scale from 1-10. Daughter states pt also has pain in the lower back area.   Please call [phone]

## 2021-02-19 NOTE — TELEPHONE ENCOUNTER
From: Zechariah Urias  To: Bethanne Hammans, APRN  Sent: 2/19/2021 11:18 AM CST  Subject: Other    Dad's having a deep, dull discomfort (I'll call it a pain) on his lower left quadrant of his abdomen and also back left side under ribs.  This came up Saturday pm

## 2021-02-19 NOTE — TELEPHONE ENCOUNTER
Micki-    Please advise. I called the patient's daughter, Shirley(okay per HIPPA) who states her father has been having LLQ pain since last Saturday 2/13. Patient saw Dr. Timmy Lal yesterday to have this looked at. Dr. Timmy Lal recommended f/u apt with GI. He also recommended to increase fluid intake and start miralax if patient is having difficulty moving bowels. Per daughter, the patient is not having difficulty moving bowels. He is regular (for the pt) and is going about every 2-3 days per usual. Last BM was Wednesday morning 2/17. He is not straining and feels no discomfort with doing so. No other symptoms besides the LLQ pain, that is not radiating towards lower left side of back. Patient does not feel any relief from the pain after having a bowel movement, and states it does not alleviate or worsen the pain. Completed Urinalysis and CBC as ordered by PCP with no significant findings on 2/18. Daughter states that Jose Luis Carpenter is having LLQ when moving, but finds some relief when he is sitting still. He is taking Tylenol at night when the pain gets bad, which was recommended by PCP.

## 2021-02-20 ENCOUNTER — LAB ENCOUNTER (OUTPATIENT)
Dept: LAB | Age: 86
End: 2021-02-20
Attending: NURSE PRACTITIONER
Payer: MEDICARE

## 2021-02-20 DIAGNOSIS — R74.8 ALKALINE PHOSPHATASE ELEVATION: ICD-10-CM

## 2021-02-20 LAB
ALBUMIN SERPL-MCNC: 3.3 G/DL (ref 3.4–5)
ALBUMIN/GLOB SERPL: 0.8 {RATIO} (ref 1–2)
ALP LIVER SERPL-CCNC: 121 U/L
ALT SERPL-CCNC: 23 U/L
ANION GAP SERPL CALC-SCNC: 3 MMOL/L (ref 0–18)
AST SERPL-CCNC: 27 U/L (ref 15–37)
BILIRUB SERPL-MCNC: 1 MG/DL (ref 0.1–2)
BUN BLD-MCNC: 20 MG/DL (ref 7–18)
BUN/CREAT SERPL: 17.7 (ref 10–20)
CALCIUM BLD-MCNC: 9.8 MG/DL (ref 8.5–10.1)
CHLORIDE SERPL-SCNC: 107 MMOL/L (ref 98–112)
CO2 SERPL-SCNC: 31 MMOL/L (ref 21–32)
CREAT BLD-MCNC: 1.13 MG/DL
GLOBULIN PLAS-MCNC: 4.2 G/DL (ref 2.8–4.4)
GLUCOSE BLD-MCNC: 89 MG/DL (ref 70–99)
M PROTEIN MFR SERPL ELPH: 7.5 G/DL (ref 6.4–8.2)
OSMOLALITY SERPL CALC.SUM OF ELEC: 294 MOSM/KG (ref 275–295)
PATIENT FASTING Y/N/NP: YES
POTASSIUM SERPL-SCNC: 4.8 MMOL/L (ref 3.5–5.1)
SODIUM SERPL-SCNC: 141 MMOL/L (ref 136–145)

## 2021-02-20 PROCEDURE — 80053 COMPREHEN METABOLIC PANEL: CPT

## 2021-02-20 PROCEDURE — 36415 COLL VENOUS BLD VENIPUNCTURE: CPT

## 2021-02-22 DIAGNOSIS — K70.30 ALCOHOLIC CIRRHOSIS OF LIVER WITHOUT ASCITES (HCC): ICD-10-CM

## 2021-02-22 DIAGNOSIS — R74.8 ALKALINE PHOSPHATASE ELEVATION: Primary | ICD-10-CM

## 2021-02-22 NOTE — TELEPHONE ENCOUNTER
I called back the patient's daughter, Kelsey Fernandes, who questioned if Micki would order a CT abd without seeing the patient. I informed her that would not be an appropriate order without seeing the patient, which she understood, and stated she just wanted to ask in case.

## 2021-02-22 NOTE — TELEPHONE ENCOUNTER
Nursing: given the change in symptoms since my last office in December, I would recommend a office follow-up to evaluate.   If he develops severe abdominal pain or other red flag/alarm symptoms, ER evaluation should be considered

## 2021-02-22 NOTE — TELEPHONE ENCOUNTER
Sushil Self with patient's daughter, Kary Shukla. She states that patient's symptoms have stayed the same throughout the weekend-still with abdominal distention & unable to button pants. He did have a bowel movement on Saturday after taking a dose of Miralax. BM every 3rd day, even with Miralax. Daughter is concerned about diverticulitis, but states the patient does not have a fever. I recommended that the patient go to the emergency room to be evaluated if symptoms continue to worsen. Daughter agreed and will continue to monitor and think of going to ED today.         Patient is scheduled:    Future Appointments   Date Time Provider Tri Soriano       [facility]   3/3/2021  3:30 PM REGINA Rebolledo [facility]

## 2021-02-22 NOTE — PROGRESS NOTES
[facility] Follow-up Visit    Myrna He will have a good bowel movement every 3 days. There is no pain or straining associated. No acute bleeding. A recent CBC was negative for any signs of leukocytosis or anemia.     The left-sided pain is not associate with food intake or bowel movement • EXC SKIN MALIG 2.1-3CM FACE,FACIAL Right 02/12/2020   • HIP REPLACEMENT SURGERY Left 01/05/2000   • LAMINECTOMY  2000   • LAPAROSCOPY, SURGICAL; PARTIAL NEPHRECTOMY Right    • REPR CMPL WND HEAD,FAC,HAND 2.6-7.5 Right 02/12/2020   • REPR CMPL WND LID,N FIBER ADULT GUMMIES OR Take 4 tablets by mouth daily with lunch. • Multiple Vitamins-Minerals (CENTRUM SILVER ULTRA MENS) Oral Tab Take 1 tablet by mouth daily. • Lovastatin 40 MG Oral Tab Take 40 mg by mouth nightly.            Allergies:  No K aspiration pneumonia, chronic back problems, aortic stenosis, A. Fib, BCC, renal cancer, hypertension, hyperlipidemia, squamous cell carcinoma, who presents for  evaluation abdominal pain        1.   Left-sided abdominal pain: The patient notes discomfort a told his prior gastroenterologist that he did not wish to continue [facility] screening in light of age. If he chooses to do so, I would recommend liver ultrasound and lab work every 6 months, otherwise follow-up as needed.         Recommend:  -Complete CT scan

## 2021-02-23 ENCOUNTER — MED REC SCAN ONLY (OUTPATIENT)
Dept: INTERNAL MEDICINE CLINIC | Facility: CLINIC | Age: 86
End: 2021-02-23

## 2021-03-02 ENCOUNTER — PATIENT MESSAGE (OUTPATIENT)
Dept: INTERNAL MEDICINE CLINIC | Facility: CLINIC | Age: 86
End: 2021-03-02

## 2021-03-02 ENCOUNTER — TELEPHONE (OUTPATIENT)
Dept: INTERNAL MEDICINE CLINIC | Facility: CLINIC | Age: 86
End: 2021-03-02

## 2021-03-02 NOTE — TELEPHONE ENCOUNTER
Pt's daughter Brianne Perkins who is on hippa said she is the care taker of the patient. She said that if Maryam Cuevas would be able to give a letter so she can get the covid vaccine. Pt would need letter to be made out to [facility] department of health.  Pts

## 2021-03-03 RX ORDER — OMEPRAZOLE 20 MG/1
CAPSULE, DELAYED RELEASE ORAL
Qty: 90 CAPSULE | Refills: 0 | Status: SHIPPED | OUTPATIENT
Start: 2021-03-03 | End: 2021-05-20

## 2021-03-03 NOTE — TELEPHONE ENCOUNTER
From: Lucio Avendano  To: Erica Workman MD  Sent: 3/2/2021 4:44 PM CST  Subject: Other    I have an appt on Thurs.  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

## 2021-03-03 NOTE — PATIENT INSTRUCTIONS
1.  Complete CT scan  2.   Consider follow-up with Dr. Marshal Judge for possible musculoskeletal/back issue

## 2021-03-04 ENCOUNTER — E-ADVICE (OUTPATIENT)
Dept: CARDIOLOGY | Age: 86
End: 2021-03-04

## 2021-03-05 NOTE — TELEPHONE ENCOUNTER
Covering for dr Kathy Patel - will be back next week   unfortuantely I do not know this pt   Reviewed note from 5/19/20 --    noted that there are many msg in the chart with discussion with the daughter since at lease 2019 -- letter sent to Ryan Chew

## 2021-03-08 NOTE — TELEPHONE ENCOUNTER
Please write this letter authorizing patient to get a Covid vaccine and pen back to me so I can sign

## 2021-03-09 ENCOUNTER — OFFICE VISIT (OUTPATIENT)
Dept: PODIATRY CLINIC | Facility: CLINIC | Age: 86
End: 2021-03-09
Payer: MEDICARE

## 2021-03-09 DIAGNOSIS — M79.675 PAIN IN TOES OF BOTH FEET: Primary | ICD-10-CM

## 2021-03-09 DIAGNOSIS — M79.674 PAIN IN TOES OF BOTH FEET: Primary | ICD-10-CM

## 2021-03-09 DIAGNOSIS — B35.1 ONYCHOMYCOSIS: ICD-10-CM

## 2021-03-09 PROCEDURE — 11721 DEBRIDE NAIL 6 OR MORE: CPT | Performed by: PODIATRIST

## 2021-03-09 NOTE — PROGRESS NOTES
HPI:    Patient ID: Mary Devine is a 80year old male. This 70-year-old male presents with recurrent pain associated with his toenails. I saw him in December 8 of 2020 and he reports relief by previous care.       ROS:     I did review present medical s noted.  I dissipate relief by this care and will see patient for treatment if and when symptoms redevelop.   He is well-informed         ASSESSMENT/PLAN:   Pain in toes of both feet  (primary encounter diagnosis)  Onychomycosis    No orders of the defined t

## 2021-03-12 ENCOUNTER — HOSPITAL ENCOUNTER (OUTPATIENT)
Dept: CT IMAGING | Age: 86
Discharge: HOME | End: 2021-03-12
Attending: NURSE PRACTITIONER
Payer: MEDICARE

## 2021-03-12 DIAGNOSIS — R10.9 LEFT SIDED ABDOMINAL PAIN: ICD-10-CM

## 2021-03-12 PROCEDURE — 74177 CT ABD & PELVIS W/CONTRAST: CPT | Performed by: NURSE PRACTITIONER

## 2021-03-18 ENCOUNTER — PATIENT MESSAGE (OUTPATIENT)
Dept: GASTROENTEROLOGY | Facility: CLINIC | Age: 86
End: 2021-03-18

## 2021-03-18 ENCOUNTER — LAB ENCOUNTER (OUTPATIENT)
Dept: LAB | Age: 86
End: 2021-03-18
Attending: NURSE PRACTITIONER
Payer: MEDICARE

## 2021-03-18 DIAGNOSIS — K76.9 LIVER LESION: ICD-10-CM

## 2021-03-18 PROCEDURE — 36415 COLL VENOUS BLD VENIPUNCTURE: CPT

## 2021-03-18 PROCEDURE — 82105 ALPHA-FETOPROTEIN SERUM: CPT

## 2021-03-18 RX ORDER — TAMSULOSIN HYDROCHLORIDE 0.4 MG/1
0.4 CAPSULE ORAL DAILY
Qty: 90 CAPSULE | Refills: 0 | Status: SHIPPED | OUTPATIENT
Start: 2021-03-18 | End: 2021-06-14

## 2021-03-18 RX ORDER — ALLOPURINOL 300 MG/1
300 TABLET ORAL DAILY
Qty: 90 TABLET | Refills: 0 | Status: SHIPPED | OUTPATIENT
Start: 2021-03-18 | End: 2021-06-14

## 2021-03-18 NOTE — TELEPHONE ENCOUNTER
Pharmacy called for patient requesting refills.      Current Outpatient Medications   Medication Sig Dispense Refill   • tamsulosin (FLOMAX) cap Take 1 capsule by mouth once daily 90 capsule 0   • ALLOPURINOL 300 MG Oral Tab Take 1 tablet by mouth once trent

## 2021-03-19 ENCOUNTER — PATIENT MESSAGE (OUTPATIENT)
Dept: GASTROENTEROLOGY | Facility: CLINIC | Age: 86
End: 2021-03-19

## 2021-03-19 NOTE — TELEPHONE ENCOUNTER
From: Ayana Medrano  To: REGINA Merino  Sent: 3/19/2021 10:46 AM CDT  Subject: Other    Yulia T: thank you for looking into that about the MRI - my remaining question is if the \"Open\" MRI offered at [facility].  in [address] will be specific/d

## 2021-03-22 ENCOUNTER — TELEPHONE (OUTPATIENT)
Dept: GASTROENTEROLOGY | Facility: CLINIC | Age: 86
End: 2021-03-22

## 2021-03-22 NOTE — TELEPHONE ENCOUNTER
Marie Gutierrez-    Please advise. Daughter would like your advice on whether or not you think Johnny's pain may be related to kidney function. I called and spoke with the patient's daughter, Bernardo Marcelino.  She states the pain is intermittent, but Imelda Villagran is complaining o

## 2021-03-22 NOTE — TELEPHONE ENCOUNTER
Patients daughter Hans Jewell calling to speak with nurse regarding patients lower left abdomin, back and flank pain. Indicates the pain is becoming more than its been. Would like to discuss to rule out kidney function from CT on 3/12.  Please call [phone]

## 2021-03-22 NOTE — TELEPHONE ENCOUNTER
Debbie SPEARS    I attempted to contact MRI department to clarify if patient would be able to still have MRI with hip replacement. I was unable to reach the department and will try again tomorrow.
From: Daija Maurer  To: REGINA Keller  Sent: 3/18/2021 12:46 PM CDT  Subject: Visit Follow-up Question    As suggested, I've scheduled my dad for an MRI on 4/1 - followup to the CT scan.  Will an Open MRI available at the [facility] location be spe
Maximus Shukla    I spoke with MRI department, who confirmed that patient may have MRI Abdomen, despite having left hip replacement. I notified the patient/daughter of the above information.
Noted and appreciated.
Alert-The patient is alert, awake and responds to voice. The patient is oriented to time, place, and person. The triage nurse is able to obtain subjective information.

## 2021-03-29 PROBLEM — R53.1 WEAKNESS GENERALIZED: Status: RESOLVED | Noted: 2019-09-03 | Resolved: 2021-03-29

## 2021-03-29 PROBLEM — R10.32 LEFT GROIN PAIN: Status: ACTIVE | Noted: 2021-02-15

## 2021-03-29 PROBLEM — R53.1 WEAKNESS: Status: RESOLVED | Noted: 2019-03-28 | Resolved: 2021-03-29

## 2021-03-29 PROBLEM — Z23 NEED FOR VACCINATION: Status: RESOLVED | Noted: 2017-02-01 | Resolved: 2021-03-29

## 2021-03-30 ENCOUNTER — HOSPITAL ENCOUNTER (OUTPATIENT)
Dept: MRI IMAGING | Age: 86
Discharge: HOME | End: 2021-03-30
Attending: NURSE PRACTITIONER
Payer: MEDICARE

## 2021-03-30 DIAGNOSIS — K76.9 LIVER LESION: ICD-10-CM

## 2021-03-30 PROCEDURE — 82565 ASSAY OF CREATININE: CPT

## 2021-03-30 PROCEDURE — 74183 MRI ABD W/O CNTR FLWD CNTR: CPT | Performed by: NURSE PRACTITIONER

## 2021-03-30 PROCEDURE — A9581 GADOXETATE DISODIUM INJ: HCPCS | Performed by: NURSE PRACTITIONER

## 2021-03-31 ENCOUNTER — OFFICE VISIT (OUTPATIENT)
Dept: INTERNAL MEDICINE CLINIC | Facility: CLINIC | Age: 86
End: 2021-03-31
Payer: MEDICARE

## 2021-03-31 ENCOUNTER — TELEPHONE (OUTPATIENT)
Dept: GASTROENTEROLOGY | Facility: CLINIC | Age: 86
End: 2021-03-31

## 2021-03-31 VITALS
WEIGHT: 172 LBS | DIASTOLIC BLOOD PRESSURE: 68 MMHG | BODY MASS INDEX: 24.08 KG/M2 | RESPIRATION RATE: 16 BRPM | HEIGHT: 71 IN | HEART RATE: 54 BPM | SYSTOLIC BLOOD PRESSURE: 150 MMHG

## 2021-03-31 DIAGNOSIS — I10 ESSENTIAL HYPERTENSION WITH GOAL BLOOD PRESSURE LESS THAN 140/90: ICD-10-CM

## 2021-03-31 DIAGNOSIS — D69.6 THROMBOCYTOPENIA (HCC): ICD-10-CM

## 2021-03-31 DIAGNOSIS — I25.119 ATHEROSCLEROSIS OF NATIVE CORONARY ARTERY OF NATIVE HEART WITH ANGINA PECTORIS (HCC): ICD-10-CM

## 2021-03-31 DIAGNOSIS — R10.32 LEFT LOWER QUADRANT ABDOMINAL PAIN: Primary | ICD-10-CM

## 2021-03-31 PROCEDURE — 99214 OFFICE O/P EST MOD 30 MIN: CPT | Performed by: INTERNAL MEDICINE

## 2021-03-31 RX ORDER — AMOXICILLIN 500 MG/1
500 CAPSULE ORAL 4 TIMES DAILY
COMMUNITY
End: 2021-05-26 | Stop reason: ALTCHOICE

## 2021-03-31 NOTE — TELEPHONE ENCOUNTER
Recall CT ABD/PELVIS and AFP in 6 months per REGINA Aguilar. Last done: 3/12/21. Next due: 9/12/21. Updated specialty tab & pt outreach.

## 2021-03-31 NOTE — PROGRESS NOTES
Patient ID: Vickie Plaza is a 80year old male. HISTORY OF PRESENT ILLNESS:   1. Left lower quadrant pain    Has been having LLQ pain that still lingers The pain is worse with moving forward or to the side and he feels fine when he sits still.  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   , Disp: , Rfl:   •  Lovastatin 40 MG Oral Tab, Take 40 mg by mouth nightly.  , Disp: , Rfl:     Allergies:No Known Allergies    Social History    Socioeconomic   Ran Out of Food in the Last Year:   Transportation Needs:       Lack of Transportation (Medical):       Lack of Transportation (Non-Medical):   Physical Activity:       Days of Exercise per Week:       Minutes of Exercise per Session:   Stress:       Fee take anti-hypertensive medicines exactly as prescribed and to follow a low salt diet as discussed. Regular exercise at least 3 times weekly will help to curb one's appetite, control weight and lead to better blood pressure control.  Record blood pressures a

## 2021-03-31 NOTE — TELEPHONE ENCOUNTER
----- Message from REGINA Freire sent at 3/31/2021 10:39 AM CDT -----  I spoke with the patient's daughter by phone (okay per HIPAA). I also spoke with the radiologist this morning. The study was very limited due to motion.   Radiology was not able

## 2021-04-27 ENCOUNTER — MED REC SCAN ONLY (OUTPATIENT)
Dept: INTERNAL MEDICINE CLINIC | Facility: CLINIC | Age: 86
End: 2021-04-27

## 2021-05-12 ENCOUNTER — OFFICE VISIT (OUTPATIENT)
Dept: INTERNAL MEDICINE CLINIC | Facility: CLINIC | Age: 86
End: 2021-05-12
Payer: MEDICARE

## 2021-05-12 VITALS
BODY MASS INDEX: 24.36 KG/M2 | DIASTOLIC BLOOD PRESSURE: 75 MMHG | SYSTOLIC BLOOD PRESSURE: 150 MMHG | RESPIRATION RATE: 16 BRPM | HEIGHT: 71 IN | HEART RATE: 59 BPM | WEIGHT: 174 LBS

## 2021-05-12 DIAGNOSIS — Q38.3 TONGUE ABNORMALITY: Primary | ICD-10-CM

## 2021-05-12 DIAGNOSIS — D69.6 THROMBOCYTOPENIA (HCC): ICD-10-CM

## 2021-05-12 DIAGNOSIS — E53.8 VITAMIN B12 DEFICIENCY: ICD-10-CM

## 2021-05-12 DIAGNOSIS — I10 ESSENTIAL HYPERTENSION WITH GOAL BLOOD PRESSURE LESS THAN 140/90: ICD-10-CM

## 2021-05-12 PROCEDURE — 99214 OFFICE O/P EST MOD 30 MIN: CPT | Performed by: INTERNAL MEDICINE

## 2021-05-12 RX ORDER — CLOTRIMAZOLE 10 MG/1
10 LOZENGE ORAL; TOPICAL
Qty: 35 TROCHE | Refills: 0 | Status: ON HOLD | OUTPATIENT
Start: 2021-05-12 | End: 2021-08-23 | Stop reason: CLARIF

## 2021-05-18 NOTE — PROGRESS NOTES
Patient ID: Sherryle Clark is a 80year old male. HISTORY OF PRESENT ILLNESS:   1. Tongue changes    Daughter has noted some whitish areas on the tongue and wonders if the patient has thrush which he had after coming out of the hospital recently.   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  , Disp: , Rfl:   •  amoxicillin 500 MG Oral Cap, Take 500 mg by mouth 4 (four) times daily. , Disp: , Rfl:     Allergies:No Known Allergies    Social History    Socioeconomic History      Marital stat Last Year:   Transportation Needs:       Lack of Transportation (Medical):       Lack of Transportation (Non-Medical):   Physical Activity:       Days of Exercise per Week:       Minutes of Exercise per Session:   Stress:       Feeling of Stress :   Social and lead to better blood pressure control. Record blood pressures at home and bring readings to your next office visit to review.     3. Thrombocytopenia (HCC)    Platelet count has been mildly low in the past. Patient has no evidence of incxreased bruising

## 2021-05-20 ENCOUNTER — LAB ENCOUNTER (OUTPATIENT)
Dept: LAB | Age: 86
End: 2021-05-20
Attending: NURSE PRACTITIONER
Payer: MEDICARE

## 2021-05-20 ENCOUNTER — PATIENT MESSAGE (OUTPATIENT)
Dept: INTERNAL MEDICINE CLINIC | Facility: CLINIC | Age: 86
End: 2021-05-20

## 2021-05-20 DIAGNOSIS — E53.8 VITAMIN B12 DEFICIENCY: ICD-10-CM

## 2021-05-20 DIAGNOSIS — K70.30 ALCOHOLIC CIRRHOSIS OF LIVER WITHOUT ASCITES (HCC): ICD-10-CM

## 2021-05-20 DIAGNOSIS — R74.8 ALKALINE PHOSPHATASE ELEVATION: ICD-10-CM

## 2021-05-20 PROCEDURE — 80053 COMPREHEN METABOLIC PANEL: CPT

## 2021-05-20 PROCEDURE — 36415 COLL VENOUS BLD VENIPUNCTURE: CPT

## 2021-05-20 PROCEDURE — 82607 VITAMIN B-12: CPT

## 2021-05-20 RX ORDER — OMEPRAZOLE 20 MG/1
20 CAPSULE, DELAYED RELEASE ORAL EVERY MORNING
Qty: 90 CAPSULE | Refills: 1 | Status: ON HOLD | OUTPATIENT
Start: 2021-05-20 | End: 2021-07-26

## 2021-05-20 RX ORDER — OMEPRAZOLE 20 MG/1
CAPSULE, DELAYED RELEASE ORAL
Qty: 90 CAPSULE | Refills: 0 | Status: SHIPPED | OUTPATIENT
Start: 2021-05-20 | End: 2021-05-26

## 2021-06-14 DIAGNOSIS — R10.32 LEFT LOWER QUADRANT ABDOMINAL PAIN: Primary | ICD-10-CM

## 2021-06-14 RX ORDER — TAMSULOSIN HYDROCHLORIDE 0.4 MG/1
CAPSULE ORAL
Qty: 90 CAPSULE | Refills: 0 | Status: SHIPPED | OUTPATIENT
Start: 2021-06-14 | End: 2021-09-21

## 2021-06-14 RX ORDER — ALLOPURINOL 300 MG/1
TABLET ORAL
Qty: 90 TABLET | Refills: 0 | Status: SHIPPED | OUTPATIENT
Start: 2021-06-14 | End: 2021-09-21

## 2021-06-15 RX ORDER — LOVASTATIN 40 MG/1
TABLET ORAL
Qty: 90 TABLET | Refills: 0 | OUTPATIENT
Start: 2021-06-15

## 2021-06-15 RX ORDER — LOVASTATIN 40 MG/1
40 TABLET ORAL NIGHTLY
Qty: 90 TABLET | Refills: 0 | Status: SHIPPED | OUTPATIENT
Start: 2021-06-15

## 2021-06-17 ENCOUNTER — LAB ENCOUNTER (OUTPATIENT)
Dept: LAB | Age: 86
End: 2021-06-17
Attending: INTERNAL MEDICINE
Payer: MEDICARE

## 2021-06-17 ENCOUNTER — OFFICE VISIT (OUTPATIENT)
Dept: PODIATRY CLINIC | Facility: CLINIC | Age: 86
End: 2021-06-17
Payer: MEDICARE

## 2021-06-17 DIAGNOSIS — R10.32 LEFT LOWER QUADRANT ABDOMINAL PAIN: ICD-10-CM

## 2021-06-17 DIAGNOSIS — B35.1 ONYCHOMYCOSIS: ICD-10-CM

## 2021-06-17 DIAGNOSIS — M79.675 PAIN IN TOES OF BOTH FEET: Primary | ICD-10-CM

## 2021-06-17 DIAGNOSIS — M79.674 PAIN IN TOES OF BOTH FEET: Primary | ICD-10-CM

## 2021-06-17 DIAGNOSIS — R74.8 ELEVATED ALKALINE PHOSPHATASE LEVEL: ICD-10-CM

## 2021-06-17 PROCEDURE — 36415 COLL VENOUS BLD VENIPUNCTURE: CPT

## 2021-06-17 PROCEDURE — 11721 DEBRIDE NAIL 6 OR MORE: CPT | Performed by: PODIATRIST

## 2021-06-17 PROCEDURE — 84080 ASSAY ALKALINE PHOSPHATASES: CPT

## 2021-06-17 PROCEDURE — 80053 COMPREHEN METABOLIC PANEL: CPT

## 2021-06-17 NOTE — PROGRESS NOTES
HPI:    Patient ID: Sarah Garrido is a 80year old male. Is 51-year-old male presents for care associated with his painful toenails. The last time I saw this patient was March 9 of this year.   Treatment rendered at that time provided him with resolution treatment if and when symptoms redevelop. He is well-informed         ASSESSMENT/PLAN:   Pain in toes of both feet  (primary encounter diagnosis)  Onychomycosis    No orders of the defined types were placed in this encounter.       Meds This Visit:  Reques

## 2021-06-22 ENCOUNTER — OFFICE VISIT (OUTPATIENT)
Dept: INTERNAL MEDICINE CLINIC | Facility: CLINIC | Age: 86
End: 2021-06-22
Payer: MEDICARE

## 2021-06-22 VITALS
WEIGHT: 174 LBS | BODY MASS INDEX: 24.36 KG/M2 | SYSTOLIC BLOOD PRESSURE: 138 MMHG | DIASTOLIC BLOOD PRESSURE: 85 MMHG | HEART RATE: 55 BPM | HEIGHT: 71 IN

## 2021-06-22 DIAGNOSIS — I10 ESSENTIAL HYPERTENSION WITH GOAL BLOOD PRESSURE LESS THAN 140/90: ICD-10-CM

## 2021-06-22 DIAGNOSIS — R10.32 LEFT LOWER QUADRANT ABDOMINAL PAIN: Primary | ICD-10-CM

## 2021-06-22 DIAGNOSIS — E78.2 MIXED HYPERLIPIDEMIA: ICD-10-CM

## 2021-06-22 PROCEDURE — 99214 OFFICE O/P EST MOD 30 MIN: CPT | Performed by: INTERNAL MEDICINE

## 2021-06-22 NOTE — PROGRESS NOTES
Patient ID: Daija Maurer is a 80year old male. HISTORY OF PRESENT ILLNESS:     1. Back Pain    Has some pain that has developed in his right lower back that comes and goes. Positionally it gets better with leaning over.    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   , Disp: , Rfl:   •  Lovastatin 40 MG Oral Tab, Take 40 mg by mouth nightly.  , Disp: , Rfl:     Allergies:No Known Allergies    Social History    Socioeconomic History Out of Food in the Last Year:   Transportation Needs:       Lack of Transportation (Medical):       Lack of Transportation (Non-Medical):   Physical Activity:       Days of Exercise per Week:       Minutes of Exercise per Session:   Stress:       Feeling o appetite, control weight and lead to better blood pressure control. Record blood pressures at home and bring readings to your next office visit to review.     3. Thrombocytopenia (HCC)    Platelet count has been mildly low in the past. Patient has no eviden

## 2021-06-25 ENCOUNTER — APPOINTMENT (OUTPATIENT)
Dept: CARDIOLOGY | Age: 86
End: 2021-06-25

## 2021-07-22 ENCOUNTER — TELEPHONE (OUTPATIENT)
Dept: GASTROENTEROLOGY | Facility: CLINIC | Age: 86
End: 2021-07-22

## 2021-07-22 ENCOUNTER — APPOINTMENT (OUTPATIENT)
Dept: CT IMAGING | Facility: HOSPITAL | Age: 86
DRG: 439 | End: 2021-07-22
Attending: EMERGENCY MEDICINE
Payer: MEDICARE

## 2021-07-22 ENCOUNTER — HOSPITAL ENCOUNTER (INPATIENT)
Facility: HOSPITAL | Age: 86
LOS: 3 days | Discharge: HOME | DRG: 439 | End: 2021-07-26
Attending: EMERGENCY MEDICINE | Admitting: INTERNAL MEDICINE
Payer: MEDICARE

## 2021-07-22 DIAGNOSIS — K74.60 CIRRHOSIS OF LIVER WITHOUT ASCITES, UNSPECIFIED HEPATIC CIRRHOSIS TYPE (HCC): ICD-10-CM

## 2021-07-22 DIAGNOSIS — K85.90 ACUTE PANCREATITIS, UNSPECIFIED COMPLICATION STATUS, UNSPECIFIED PANCREATITIS TYPE: Primary | ICD-10-CM

## 2021-07-22 LAB
ALBUMIN SERPL-MCNC: 3.3 G/DL (ref 3.4–5)
ALBUMIN/GLOB SERPL: 0.7 {RATIO} (ref 1–2)
ALP LIVER SERPL-CCNC: 176 U/L
ALT SERPL-CCNC: 98 U/L
ANION GAP SERPL CALC-SCNC: 8 MMOL/L (ref 0–18)
AST SERPL-CCNC: 113 U/L (ref 15–37)
BASOPHILS # BLD AUTO: 0.02 X10(3) UL (ref 0–0.2)
BASOPHILS NFR BLD AUTO: 0.1 %
BILIRUB SERPL-MCNC: 1.7 MG/DL (ref 0.1–2)
BUN BLD-MCNC: 21 MG/DL (ref 7–18)
BUN/CREAT SERPL: 18.6 (ref 10–20)
CALCIUM BLD-MCNC: 9.4 MG/DL (ref 8.5–10.1)
CHLORIDE SERPL-SCNC: 101 MMOL/L (ref 98–112)
CO2 SERPL-SCNC: 26 MMOL/L (ref 21–32)
CREAT BLD-MCNC: 1.13 MG/DL
DEPRECATED RDW RBC AUTO: 49.8 FL (ref 35.1–46.3)
EOSINOPHIL # BLD AUTO: 0.01 X10(3) UL (ref 0–0.7)
EOSINOPHIL NFR BLD AUTO: 0.1 %
ERYTHROCYTE [DISTWIDTH] IN BLOOD BY AUTOMATED COUNT: 13.9 % (ref 11–15)
GLOBULIN PLAS-MCNC: 4.6 G/DL (ref 2.8–4.4)
GLUCOSE BLD-MCNC: 73 MG/DL (ref 70–99)
HCT VFR BLD AUTO: 45.1 %
HGB BLD-MCNC: 15.1 G/DL
IMM GRANULOCYTES # BLD AUTO: 0.07 X10(3) UL (ref 0–1)
IMM GRANULOCYTES NFR BLD: 0.4 %
LIPASE SERPL-CCNC: 5110 U/L (ref 73–393)
LYMPHOCYTES # BLD AUTO: 1.28 X10(3) UL (ref 1–4)
LYMPHOCYTES NFR BLD AUTO: 7.7 %
M PROTEIN MFR SERPL ELPH: 7.9 G/DL (ref 6.4–8.2)
MCH RBC QN AUTO: 32.8 PG (ref 26–34)
MCHC RBC AUTO-ENTMCNC: 33.5 G/DL (ref 31–37)
MCV RBC AUTO: 97.8 FL
MONOCYTES # BLD AUTO: 1.36 X10(3) UL (ref 0.1–1)
MONOCYTES NFR BLD AUTO: 8.2 %
NEUTROPHILS # BLD AUTO: 13.81 X10 (3) UL (ref 1.5–7.7)
NEUTROPHILS # BLD AUTO: 13.81 X10(3) UL (ref 1.5–7.7)
NEUTROPHILS NFR BLD AUTO: 83.5 %
OSMOLALITY SERPL CALC.SUM OF ELEC: 282 MOSM/KG (ref 275–295)
PLATELET # BLD AUTO: 117 10(3)UL (ref 150–450)
POTASSIUM SERPL-SCNC: 4.2 MMOL/L (ref 3.5–5.1)
RBC # BLD AUTO: 4.61 X10(6)UL
SODIUM SERPL-SCNC: 135 MMOL/L (ref 136–145)
WBC # BLD AUTO: 16.6 X10(3) UL (ref 4–11)

## 2021-07-22 PROCEDURE — 74177 CT ABD & PELVIS W/CONTRAST: CPT | Performed by: EMERGENCY MEDICINE

## 2021-07-22 RX ORDER — SODIUM CHLORIDE, SODIUM LACTATE, POTASSIUM CHLORIDE, CALCIUM CHLORIDE 600; 310; 30; 20 MG/100ML; MG/100ML; MG/100ML; MG/100ML
INJECTION, SOLUTION INTRAVENOUS ONCE
Status: COMPLETED | OUTPATIENT
Start: 2021-07-22 | End: 2021-07-22

## 2021-07-22 RX ORDER — TAMSULOSIN HYDROCHLORIDE 0.4 MG/1
0.4 CAPSULE ORAL ONCE
Status: COMPLETED | OUTPATIENT
Start: 2021-07-22 | End: 2021-07-22

## 2021-07-22 RX ORDER — ENALAPRILAT 2.5 MG/2ML
1.25 INJECTION INTRAVENOUS ONCE
Status: COMPLETED | OUTPATIENT
Start: 2021-07-22 | End: 2021-07-22

## 2021-07-22 RX ORDER — ONDANSETRON 2 MG/ML
4 INJECTION INTRAMUSCULAR; INTRAVENOUS ONCE
Status: COMPLETED | OUTPATIENT
Start: 2021-07-22 | End: 2021-07-22

## 2021-07-22 NOTE — TELEPHONE ENCOUNTER
Rachel Washburn-    Please advise. I spoke to Jeri, patient's daughter, who cares for him. Since yesterday, Kandis Jenkins has been experiencing increasing pain in the \"upper abdomen. \" This location is tender to the touch.  Shirley was unable to give me an exact number

## 2021-07-22 NOTE — ED PROVIDER NOTES
Patient Seen in: [facility] AND [facility] Emergency Department      History   Patient presents with:  Abdominal Pain  Nausea/vomiting    Stated Complaint: abdominal pain/fever/vomiting     HPI/Subjective:   HPI    80-year-old  male with history of atrial fibril NEPHRECTOMY Right    • REPR CMPL WND HEAD,FAC,HAND 2.6-7.5 Right 02/12/2020   • REPR CMPL WND LID,NOS,EAR 2.5-7.5 Right 02/12/2020                Social History    Tobacco Use      Smoking status: Former Smoker        Packs/day: 0.50        Years: 30.00 and Rhythm: Regular rhythm. Pulmonary:      Effort: Pulmonary effort is normal.   Abdominal:      Palpations: Abdomen is soft. Tenderness: There is generalized abdominal tenderness. Musculoskeletal:         General: No deformity.       Cervical filemon - 4.4 g/dL    A/G Ratio 0.7 (L) 1.0 - 2.0   Lipase    Collection Time: 07/22/21  2:35 PM   Result Value Ref Range    Lipase 5,110 (H) 73 - 393 U/L   CBC W/ DIFFERENTIAL    Collection Time: 07/22/21  2:35 PM   Result Value Ref Range    WBC 16.6 (H) 4.0 - 11 left lobe may suggest small cysts or hamartoma as. Regenerating nodules are not excluded. Recommend MRI of the liver to further assess. 3. Somewhat distended gallbladder with soft tissue attenuation within suggesting either biliary sludge or gallstones. and found elevated lipase, leukocytosis, elevated LFTs, electrolytes reassuring, CT with pancreatitis with necrosis? mass?pseudocyst  - pain meds offered, pt declining  - discussed with Dr. Huey Antonio - requesting luciano and [email or web address], ICU admission  - discussed pancreatitis K85.90 7/22/2021 Unknown

## 2021-07-22 NOTE — TELEPHONE ENCOUNTER
I called and spoke to Shirley to relay confirmation from Susan Gonzalez that patient should go to ED. She verbalizes understanding and is gathering his things to bring him at this time.

## 2021-07-22 NOTE — TELEPHONE ENCOUNTER
Nursing: Given the patient's age and history as described below, I would agree with triage nursing advice that he should be evaluated in the ED for more urgent evaluation

## 2021-07-22 NOTE — TELEPHONE ENCOUNTER
Kushal Conklin thinks patient has a gall bladder attack - states patient states his abdomen area above the stomach, underneath the ribs is tender. States patient had low grade fever, 99.8 F.   Also wants to mention that last lab results reflected elevated liver

## 2021-07-23 ENCOUNTER — APPOINTMENT (OUTPATIENT)
Dept: ULTRASOUND IMAGING | Facility: HOSPITAL | Age: 86
DRG: 439 | End: 2021-07-23
Attending: INTERNAL MEDICINE
Payer: MEDICARE

## 2021-07-23 PROBLEM — K85.90 ACUTE PANCREATITIS, UNSPECIFIED COMPLICATION STATUS, UNSPECIFIED PANCREATITIS TYPE: Status: ACTIVE | Noted: 2021-07-23

## 2021-07-23 PROBLEM — K74.60 CIRRHOSIS OF LIVER WITHOUT ASCITES, UNSPECIFIED HEPATIC CIRRHOSIS TYPE (HCC): Status: ACTIVE | Noted: 2021-07-23

## 2021-07-23 PROBLEM — I48.91 ATRIAL FIBRILLATION (HCC): Status: ACTIVE | Noted: 2019-03-28

## 2021-07-23 PROBLEM — K85.90 ACUTE PANCREATITIS, UNSPECIFIED COMPLICATION STATUS, UNSPECIFIED PANCREATITIS TYPE (HCC): Status: ACTIVE | Noted: 2021-07-23

## 2021-07-23 LAB
ALBUMIN SERPL-MCNC: 2.8 G/DL (ref 3.4–5)
ALBUMIN/GLOB SERPL: 0.8 {RATIO} (ref 1–2)
ALP LIVER SERPL-CCNC: 129 U/L
ALT SERPL-CCNC: 60 U/L
ANION GAP SERPL CALC-SCNC: 7 MMOL/L (ref 0–18)
AST SERPL-CCNC: 55 U/L (ref 15–37)
BILIRUB SERPL-MCNC: 1.3 MG/DL (ref 0.1–2)
BUN BLD-MCNC: 16 MG/DL (ref 7–18)
BUN/CREAT SERPL: 16.8 (ref 10–20)
CALCIUM BLD-MCNC: 8.7 MG/DL (ref 8.5–10.1)
CHLORIDE SERPL-SCNC: 106 MMOL/L (ref 98–112)
CO2 SERPL-SCNC: 25 MMOL/L (ref 21–32)
CREAT BLD-MCNC: 0.95 MG/DL
DEPRECATED RDW RBC AUTO: 50.4 FL (ref 35.1–46.3)
ERYTHROCYTE [DISTWIDTH] IN BLOOD BY AUTOMATED COUNT: 13.9 % (ref 11–15)
GLOBULIN PLAS-MCNC: 3.6 G/DL (ref 2.8–4.4)
GLUCOSE BLD-MCNC: 61 MG/DL (ref 70–99)
GLUCOSE BLDC GLUCOMTR-MCNC: 103 MG/DL (ref 70–99)
GLUCOSE BLDC GLUCOMTR-MCNC: 151 MG/DL (ref 70–99)
GLUCOSE BLDC GLUCOMTR-MCNC: 163 MG/DL (ref 70–99)
GLUCOSE BLDC GLUCOMTR-MCNC: 66 MG/DL (ref 70–99)
GLUCOSE BLDC GLUCOMTR-MCNC: 67 MG/DL (ref 70–99)
GLUCOSE BLDC GLUCOMTR-MCNC: 84 MG/DL (ref 70–99)
GLUCOSE BLDC GLUCOMTR-MCNC: 87 MG/DL (ref 70–99)
GLUCOSE BLDC GLUCOMTR-MCNC: 97 MG/DL (ref 70–99)
HCT VFR BLD AUTO: 39.7 %
HGB BLD-MCNC: 13.4 G/DL
M PROTEIN MFR SERPL ELPH: 6.4 G/DL (ref 6.4–8.2)
MCH RBC QN AUTO: 33.1 PG (ref 26–34)
MCHC RBC AUTO-ENTMCNC: 33.8 G/DL (ref 31–37)
MCV RBC AUTO: 98 FL
OSMOLALITY SERPL CALC.SUM OF ELEC: 285 MOSM/KG (ref 275–295)
PLATELET # BLD AUTO: 100 10(3)UL (ref 150–450)
POTASSIUM SERPL-SCNC: 3.8 MMOL/L (ref 3.5–5.1)
RBC # BLD AUTO: 4.05 X10(6)UL
SODIUM SERPL-SCNC: 138 MMOL/L (ref 136–145)
WBC # BLD AUTO: 16.8 X10(3) UL (ref 4–11)

## 2021-07-23 PROCEDURE — 76705 ECHO EXAM OF ABDOMEN: CPT | Performed by: INTERNAL MEDICINE

## 2021-07-23 PROCEDURE — 99223 1ST HOSP IP/OBS HIGH 75: CPT | Performed by: INTERNAL MEDICINE

## 2021-07-23 RX ORDER — DEXTROSE, SODIUM CHLORIDE, SODIUM LACTATE, POTASSIUM CHLORIDE, AND CALCIUM CHLORIDE 5; .6; .31; .03; .02 G/100ML; G/100ML; G/100ML; G/100ML; G/100ML
INJECTION, SOLUTION INTRAVENOUS CONTINUOUS
Status: DISCONTINUED | OUTPATIENT
Start: 2021-07-23 | End: 2021-07-25

## 2021-07-23 RX ORDER — DEXTROSE AND SODIUM CHLORIDE 5; .45 G/100ML; G/100ML
INJECTION, SOLUTION INTRAVENOUS CONTINUOUS
Status: DISCONTINUED | OUTPATIENT
Start: 2021-07-23 | End: 2021-07-23

## 2021-07-23 RX ORDER — DEXTROSE MONOHYDRATE 25 G/50ML
50 INJECTION, SOLUTION INTRAVENOUS AS NEEDED
Status: DISCONTINUED | OUTPATIENT
Start: 2021-07-23 | End: 2021-07-26

## 2021-07-23 RX ORDER — SODIUM CHLORIDE 9 MG/ML
INJECTION, SOLUTION INTRAVENOUS CONTINUOUS
Status: DISCONTINUED | OUTPATIENT
Start: 2021-07-23 | End: 2021-07-23

## 2021-07-23 RX ORDER — TAMSULOSIN HYDROCHLORIDE 0.4 MG/1
0.4 CAPSULE ORAL DAILY
Status: DISCONTINUED | OUTPATIENT
Start: 2021-07-23 | End: 2021-07-23

## 2021-07-23 RX ORDER — DEXTROSE MONOHYDRATE 25 G/50ML
INJECTION, SOLUTION INTRAVENOUS
Status: DISPENSED
Start: 2021-07-23 | End: 2021-07-23

## 2021-07-23 RX ORDER — TAMSULOSIN HYDROCHLORIDE 0.4 MG/1
0.4 CAPSULE ORAL NIGHTLY
Status: DISCONTINUED | OUTPATIENT
Start: 2021-07-23 | End: 2021-07-26

## 2021-07-23 RX ORDER — FUROSEMIDE 10 MG/ML
10 INJECTION INTRAMUSCULAR; INTRAVENOUS ONCE
Status: COMPLETED | OUTPATIENT
Start: 2021-07-23 | End: 2021-07-24

## 2021-07-23 RX ORDER — ALLOPURINOL 300 MG/1
300 TABLET ORAL DAILY
Status: DISCONTINUED | OUTPATIENT
Start: 2021-07-23 | End: 2021-07-26

## 2021-07-23 RX ORDER — PRAVASTATIN SODIUM 20 MG
20 TABLET ORAL NIGHTLY
Status: DISCONTINUED | OUTPATIENT
Start: 2021-07-23 | End: 2021-07-26

## 2021-07-23 RX ORDER — MORPHINE SULFATE 2 MG/ML
2 INJECTION, SOLUTION INTRAMUSCULAR; INTRAVENOUS EVERY 4 HOURS PRN
Status: DISCONTINUED | OUTPATIENT
Start: 2021-07-23 | End: 2021-07-26

## 2021-07-23 RX ORDER — PANTOPRAZOLE SODIUM 20 MG/1
20 TABLET, DELAYED RELEASE ORAL
Refills: 1 | Status: DISCONTINUED | OUTPATIENT
Start: 2021-07-23 | End: 2021-07-23

## 2021-07-23 RX ORDER — DEXTROSE MONOHYDRATE 25 G/50ML
INJECTION, SOLUTION INTRAVENOUS
Status: COMPLETED
Start: 2021-07-23 | End: 2021-07-23

## 2021-07-23 RX ORDER — ACETAMINOPHEN 325 MG/1
650 TABLET ORAL EVERY 6 HOURS PRN
Status: DISCONTINUED | OUTPATIENT
Start: 2021-07-23 | End: 2021-07-26

## 2021-07-23 RX ORDER — AMLODIPINE BESYLATE 2.5 MG/1
2.5 TABLET ORAL DAILY
Status: DISCONTINUED | OUTPATIENT
Start: 2021-07-23 | End: 2021-07-26

## 2021-07-23 RX ORDER — SODIUM CHLORIDE, SODIUM LACTATE, POTASSIUM CHLORIDE, CALCIUM CHLORIDE 600; 310; 30; 20 MG/100ML; MG/100ML; MG/100ML; MG/100ML
INJECTION, SOLUTION INTRAVENOUS CONTINUOUS
Status: DISCONTINUED | OUTPATIENT
Start: 2021-07-23 | End: 2021-07-23

## 2021-07-23 NOTE — H&P
[facility] - [facility]    History and Physical    Erick Suarez Patient Status:  Inpatient    1926 MRN [id number]   Location [facility] 3W/SW Attending Crystal Villa MD   Hosp Day # 0 PCP Brielle Eckert MD     Date:  2021  Date o Forearm x 2   • CARPAL TUNNEL RELEASE Right 2009   • COLONOSCOPY  2000   • EXC SKIN MALIG 2.1-3CM FACE,FACIAL Right 02/12/2020   • HIP REPLACEMENT SURGERY Left 01/05/2000   • LAMINECTOMY  2000   • LAPAROSCOPY, SURGICAL; PARTIAL NEPHRECTOMY Right    • REPR ULTRA MENS) Oral Tab, Take 1 tablet by mouth daily. Lovastatin 40 MG Oral Tab, Take 40 mg by mouth nightly. clotrimazole 10 MG Mouth/Throat Brodie, Take 1 lozenge (10 mg total) by mouth 5 (five) times daily.   lidocaine-menthol 4-1 % External Patch, P (L) 07/23/2021    CA 8.7 07/23/2021    ALB 2.8 (L) 07/23/2021    ALKPHO 129 (H) 07/23/2021    BILT 1.3 07/23/2021    TP 6.4 07/23/2021    AST 55 (H) 07/23/2021    ALT 60 07/23/2021    INR 1.86 (H) 11/03/2020    PT 14.1 06/02/2014    T4F 1.3 11/05/2020    T extension of a gastric ulcer. Again correlate with gastroscopy. 5. Bilateral renal cysts. Nonobstructing stones in the right kidney. No hydronephrosis. 6. Uncomplicated diverticulosis of the descending and sigmoid colon without acute diverticulitis.   Ap without ascites, unspecified hepatic cirrhosis type ([facility] -we will monitor    DVT prophylaxis with [address] gastroenterology consultation will follow recommendation  Continue Zosyn, keep n.p.o.  IV fluids D5 Ringer lactate  Endocrinology

## 2021-07-23 NOTE — CM/SW NOTE
PT/OT recommending home. Met with patient, daughter Mark Chen daughter Cate Loredor at bedside. Patient typically stays w/ his daughter Leatha Stafford in [facility]. He moved in w/ Cate Poser when Leatha Stafford was diagnosed w/ COVID.  Ros lives in a 2 level home, patient karol

## 2021-07-23 NOTE — OCCUPATIONAL THERAPY NOTE
OCCUPATIONAL THERAPY EVALUATION - INPATIENT     Room Number: NQP50/OFF36-O  Evaluation Date: 7/23/2021  Type of Evaluation: Initial  Presenting Problem:  (Acute pancreatitis )    Physician Order: IP Consult to Occupational Therapy  Reason for Therapy: ADL/ RECOMMENDATIONS  OT Discharge Recommendations: 24 hour care/supervision;Home       PLAN  OT Treatment Plan: Balance activities; Energy conservation/work simplification techniques;ADL training;Functional transfer training;UE strengthening/ROM; Endurance train • REPR CMPL WND LID,NOS,EAR 2.5-7.5 Right 02/12/2020       HOME SITUATION  Type of Home: House  Home Layout: One level  Lives With: Daughter;Caregiver part-time (Someone with him 24/7 )     Toilet and Equipment: Standard height toilet;Grab bar  Shower/Tu much help from another person does the patient currently need…  -   Putting on and taking off regular lower body clothing?: A Lot  -   Bathing (including washing, rinsing, drying)?: A Lot  -   Toileting, which includes using toilet, bedpan or urinal? : A L

## 2021-07-23 NOTE — SLP NOTE
BSE orders received and acknowledged. Unable to complete BSE at this time as family has requested Pt not be awaken. RN present. Will f/u later in p.m, schedule permitting.        Stefanie Krabbe, M.S. CCC/SLP  Speech-Language Pathologist  [facility] V[facility]quez

## 2021-07-23 NOTE — PLAN OF CARE
Pt transferred from COF, resting comfortably in bed. A/O x2-3, frequent reorientation. IVF & IV Protonix infusing. NPO. Accu-checks q4. Daughters at bedside aware of care of plan. Fall precautions in place. Call light within reach. Frequent rounding.  Will activity and pre-medicate as appropriate  Outcome: Progressing     Problem: SAFETY ADULT - FALL  Goal: Free from fall injury  Description: INTERVENTIONS:  - Assess pt frequently for physical needs  - Identify cognitive and physical deficits and behaviors t physician/LIP order or complex needs related to functional status, cognitive ability or social support system  Outcome: Progressing     Problem: METABOLIC/FLUID AND ELECTROLYTES - ADULT  Goal: Glucose maintained within prescribed range  Description: INTERV

## 2021-07-23 NOTE — ED QUICK NOTES
Patient assisted up to bathroom by Micheal BENNETT. Post void residual 150ml. Family at bedside. Call light within reach.  Patient care endorsed to Cheko Klein RN

## 2021-07-23 NOTE — PHYSICAL THERAPY NOTE
PHYSICAL THERAPY EVALUATION - INPATIENT     Room Number: UWJ69/CKV46-F  Evaluation Date: 7/23/2021  Type of Evaluation: Initial   Physician Order: PT Eval and Treat    Presenting Problem: abdominal pain and vomiting; found to have acute pancreatitis  Reas mealtimes (although he is currently NPO). He is in bed with all needs in reach, SCD's in place and alarm set, daughter in room. RN is aware. Anticipate discharge home with family when pt is medically ready.     Patient will benefit from continued IP PT serv x 2   • CARPAL TUNNEL RELEASE Right 2009   • COLONOSCOPY  2000   • EXC SKIN MALIG 2.1-3CM FACE,FACIAL Right 02/12/2020   • HIP REPLACEMENT SURGERY Left 01/05/2000   • LAMINECTOMY  2000   • LAPAROSCOPY, SURGICAL; PARTIAL NEPHRECTOMY Right    • REPR CMPL WND Position: Sitting    O2 WALK  Oxygen Therapy  SPO2% on Room Air at Rest: 97  SPO2% Ambulation on Room Air: 96    AM-PAC '6-Clicks' INPATIENT SHORT FORM - BASIC MOBILITY  How much difficulty does the patient currently have. ..  -   Turning over in bed (inclu supervision with walker - rolling     Goal #2  Current Status    Goal #3 Patient is able to ambulate 150 feet with assist device: walker - rolling at assistance level: supervision   Goal #3   Current Status    Goal #4 Patient will negotiate 6 stairs/one cu

## 2021-07-23 NOTE — CONSULTS
[facility] 89   Gastroenterology Consultation Note    [facility]  Patient Status:    Inpatient  Date of Admission:         7/22/2021, Hospital day #0  Attending:   Turner Hoyos MD  PCP:     Marcela Red MD    Reason for Consultation cancers   • Cancer of kidney ([facility] 2005   • Cholecystitis 2013   • Cirrhosis, cryptogenic ([facility] 2013   • Essential hypertension     controlled w/medication   • Heart disease     aortic stenosis;  Afib, coronary artery disease   • High blood pressure    • Hi (ZYLOPRIM) tab 300 mg, 300 mg, Oral, Daily  •  dextrose 50 % injection 50 mL, 50 mL, Intravenous, PRN  •  tamsulosin HCl (FLOMAX) cap 0.4 mg, 0.4 mg, Oral, Nightly  •  Pravastatin Sodium (PRAVACHOL) tab 20 mg, 20 mg, Oral, Nightly  •  Piperacillin Sod-Tazo Brodie, Rfl: 0  lidocaine-menthol 4-1 % External Patch, Place 1 patch onto the skin daily. , Disp: 30 patch, Rfl: 0        Review of Systems:  CONSTITUTIONAL:  negative for fevers, rigors  EYES:  negative for diplopia   RESPIRATORY:  negative for severe loretta (ER)    Result Date: 7/22/2021  CONCLUSION:  1. Enlargement of the pancreatic body and tail with surrounding inflammatory reaction suggestive of acute pancreatitis of the distal pancreas.   Of note, there is a new 1.9 x 1.8 cm in size low-attenuation region since the urinary bladder is distended, and there are probable small diverticula at the bladder dome with a possibly mildly thickened bladder wall suggesting some degree of bladder neck outlet obstruction. Correlate clinically.  8. Moderate right inguinal 8mg/hr gtt  - keep NPO, pain control, anti-emetics  - agree with IV abx, currently on zosyn for GNR, anaerobes coverage  - gentle IVF as tolerated by cardiac status, LR is favored in setting of acute panc  - continue to monitor LFTs  - defer endoscopic francesca

## 2021-07-23 NOTE — PLAN OF CARE
Patient is alert and oriented x 2-3, on room air. Fluids infusing. Complains of abdominal pain 8/10 but refuses pain medication, resting comfortably in bed. Ambulatory with assist x 1 with walker to bathroom. Daughter, Lou Santoro, at bedside.  Bed alarm on with

## 2021-07-23 NOTE — ED QUICK NOTES
Patient requesting a dose of Flomax, stating that he is feeling pelvic pressure. Dr. Rockwell Naas aware, verbal order for flomax 0.4mg PO stat, NPO, but okay for sip of water with medication.

## 2021-07-24 LAB
ALBUMIN SERPL-MCNC: 2.3 G/DL (ref 3.4–5)
ALBUMIN/GLOB SERPL: 0.6 {RATIO} (ref 1–2)
ALP LIVER SERPL-CCNC: 113 U/L
ALT SERPL-CCNC: 39 U/L
ANION GAP SERPL CALC-SCNC: 7 MMOL/L (ref 0–18)
AST SERPL-CCNC: 34 U/L (ref 15–37)
BILIRUB SERPL-MCNC: 1.4 MG/DL (ref 0.1–2)
BUN BLD-MCNC: 15 MG/DL (ref 7–18)
BUN/CREAT SERPL: 14 (ref 10–20)
CALCIUM BLD-MCNC: 8.2 MG/DL (ref 8.5–10.1)
CHLORIDE SERPL-SCNC: 107 MMOL/L (ref 98–112)
CO2 SERPL-SCNC: 26 MMOL/L (ref 21–32)
CREAT BLD-MCNC: 1.07 MG/DL
DEPRECATED RDW RBC AUTO: 51.1 FL (ref 35.1–46.3)
ERYTHROCYTE [DISTWIDTH] IN BLOOD BY AUTOMATED COUNT: 13.9 % (ref 11–15)
GLOBULIN PLAS-MCNC: 3.7 G/DL (ref 2.8–4.4)
GLUCOSE BLD-MCNC: 108 MG/DL (ref 70–99)
GLUCOSE BLDC GLUCOMTR-MCNC: 100 MG/DL (ref 70–99)
GLUCOSE BLDC GLUCOMTR-MCNC: 100 MG/DL (ref 70–99)
GLUCOSE BLDC GLUCOMTR-MCNC: 103 MG/DL (ref 70–99)
GLUCOSE BLDC GLUCOMTR-MCNC: 113 MG/DL (ref 70–99)
GLUCOSE BLDC GLUCOMTR-MCNC: 141 MG/DL (ref 70–99)
GLUCOSE BLDC GLUCOMTR-MCNC: 97 MG/DL (ref 70–99)
HCT VFR BLD AUTO: 39.8 %
HGB BLD-MCNC: 13.2 G/DL
INR BLD: 2 (ref 0.9–1.2)
LIPASE SERPL-CCNC: 280 U/L (ref 73–393)
M PROTEIN MFR SERPL ELPH: 6 G/DL (ref 6.4–8.2)
MCH RBC QN AUTO: 33.1 PG (ref 26–34)
MCHC RBC AUTO-ENTMCNC: 33.2 G/DL (ref 31–37)
MCV RBC AUTO: 99.7 FL
OSMOLALITY SERPL CALC.SUM OF ELEC: 291 MOSM/KG (ref 275–295)
PLATELET # BLD AUTO: 90 10(3)UL (ref 150–450)
POTASSIUM SERPL-SCNC: 3.6 MMOL/L (ref 3.5–5.1)
PROTHROMBIN TIME: 22.3 SECONDS (ref 11.8–14.5)
RBC # BLD AUTO: 3.99 X10(6)UL
SODIUM SERPL-SCNC: 140 MMOL/L (ref 136–145)
WBC # BLD AUTO: 13 X10(3) UL (ref 4–11)

## 2021-07-24 PROCEDURE — 99233 SBSQ HOSP IP/OBS HIGH 50: CPT | Performed by: INTERNAL MEDICINE

## 2021-07-24 PROCEDURE — 99223 1ST HOSP IP/OBS HIGH 75: CPT | Performed by: INTERNAL MEDICINE

## 2021-07-24 RX ORDER — FUROSEMIDE 10 MG/ML
10 INJECTION INTRAMUSCULAR; INTRAVENOUS ONCE
Status: COMPLETED | OUTPATIENT
Start: 2021-07-24 | End: 2021-07-24

## 2021-07-24 NOTE — PLAN OF CARE
Pt A/O X3. Daughter at bedside. IVF running. Antibiotics continued. Accuchecks q 4. Denies nausea. Reports tenderness to abdomen with movement. Blanchable redness to heels. Blue boots. SCDs. Call light within reach. Safety precautions in place.  Plan to dis increased pain with activity and pre-medicate as appropriate  Outcome: Progressing     Problem: SAFETY ADULT - FALL  Goal: Free from fall injury  Description: INTERVENTIONS:  - Assess pt frequently for physical needs  - Identify cognitive and physical defi post-hospital services based on physician/LIP order or complex needs related to functional status, cognitive ability or social support system  Outcome: Progressing     Problem: METABOLIC/FLUID AND ELECTROLYTES - ADULT  Goal: Glucose maintained within presc

## 2021-07-24 NOTE — CONSULTS
[facility]    Report of Consultation    Lucio Avendano Patient Status:  Inpatient    1926 MRN [id number]   Location [facility] 4W/SW/SE Attending Fransisca García MD   Hosp Day # 1 PCP Richard Rocha MD     Date of Admission: Right 2009   • COLONOSCOPY  2000   • EXC SKIN MALIG 2.1-3CM FACE,FACIAL Right 02/12/2020   • HIP REPLACEMENT SURGERY Left 01/05/2000   • LAMINECTOMY  2000   • LAPAROSCOPY, SURGICAL; PARTIAL NEPHRECTOMY Right    • REPR CMPL WND HEAD,FAC,HAND 2.6-7.5 Right 0 chloride 0.9% 100 mL infusion, 8 mg/hr, Intravenous, Continuous  dextrose 5 % /lactated ringers infusion, , Intravenous, Continuous      tamsulosin (FLOMAX) cap, Take 1 capsule by mouth once daily  ALLOPURINOL 300 MG Oral Tab, Take 1 tablet by mouth once d extremities. No swelling noted. Integument: No lesions. No erythema. Psychiatric: Appropriate mood and affect.     Results:     Laboratory Data:  Lab Results   Component Value Date    WBC 13.0 (H) 07/24/2021    HGB 13.2 07/24/2021    HCT 39.8 07/24/2021 ultrasound. 4. Abnormal circumferential thickening of the body and antrum of the stomach extending to the duodenum suggesting  possible gastritis, although I can not exclude infiltrating submucosal neoplasm. Recommend gastroscopy.   Moderate fluid-filled d

## 2021-07-24 NOTE — PLAN OF CARE
Started on clear liquids, tolerating well. Reporting lower abdominal cramping, per daughter she believes this is related to pt being constipated. Pt denying epigastric/upper abdominal pain.  IVF infusing, rate lowered to prevent fluid overload, one time dos

## 2021-07-24 NOTE — SLP NOTE
ADULT SWALLOWING EVALUATION    ASSESSMENT    ASSESSMENT/OVERALL IMPRESSION:  PPE REQUIRED. THIS SLP WORE GLOVES AND DROPLET MASK. HANDS SANITIZED/WASHED UPON ENTRANCE/EXIT. Orders received, chart reviewed.  Pt known to SLP department with [facility] on 11/05/2 Precautions: Upright position; Slow rate;Small bites and sips; No straw  Medication Administration Recommendations: Crushed in puree  Treatment Plan/Recommendations: SLP to reassess;Aspiration precautions  Discharge Recommendations/Plan: Undetermined    HIST EXAMINATION  Dentition: Functional  Symmetry: Within Functional Limits  Strength: Unable to assess  Range of Motion:  (Overall reduced)  Rate of Motion: Reduced    Voice Quality: Clear  Respiratory Status: Unlabored  Consistencies Trialed:  Thin liquids  Me

## 2021-07-24 NOTE — PROGRESS NOTES
[facility]    CDI Prediction Tool Protocol (Vancomycin Initiated)    OVP (oral vancomycin prophylaxis) 125 mg PO Daily is being started in this patient based on a score of 13.       Score Breakdown:  High risk antibiotic use (5 points)  Malignanc

## 2021-07-24 NOTE — PROGRESS NOTES
[facility] [address]     Gastroenterology Progress Note    Lucio Avendano Patient Status:  Inpatient    1926 MRN [id number]   Location [facility] 4W/SW/SE Attending Fransisca García MD   Hosp Day # 1 PCP MD Venecia Lopez with a small air collection in the wall of the distal antrum suggesting a small penetrating ulcer.     # Acute pancreatitis suspect biliary, a/w possible early pseudocyst   # Cholelithiasis with distended gallbladder on CT c/f cholecystitis.  No biliary beverly counseling/discussion of the above and additional time in coordination of care/discussion with care team.       Luiza Gamboa MD  [facility] Gastroenterology      Results:     Lab Results   Component Value Date    WBC 13.0 (H) 07/24/2021    HGB 13.2 07/24 gallstones. Correlate with ultrasound. 4. Abnormal circumferential thickening of the body and antrum of the stomach extending to the duodenum suggesting  possible gastritis, although I can not exclude infiltrating submucosal neoplasm.   Recommend gastrosco

## 2021-07-25 LAB
ALBUMIN SERPL-MCNC: 2.3 G/DL (ref 3.4–5)
ALBUMIN/GLOB SERPL: 0.6 {RATIO} (ref 1–2)
ALP LIVER SERPL-CCNC: 115 U/L
ALT SERPL-CCNC: 39 U/L
ANION GAP SERPL CALC-SCNC: 4 MMOL/L (ref 0–18)
AST SERPL-CCNC: 32 U/L (ref 15–37)
BILIRUB SERPL-MCNC: 1.6 MG/DL (ref 0.1–2)
BUN BLD-MCNC: 13 MG/DL (ref 7–18)
BUN/CREAT SERPL: 11.9 (ref 10–20)
CALCIUM BLD-MCNC: 8.2 MG/DL (ref 8.5–10.1)
CHLORIDE SERPL-SCNC: 105 MMOL/L (ref 98–112)
CO2 SERPL-SCNC: 30 MMOL/L (ref 21–32)
CORTIS SERPL-MCNC: 18.6 UG/DL
CREAT BLD-MCNC: 1.09 MG/DL
DEPRECATED RDW RBC AUTO: 50 FL (ref 35.1–46.3)
ERYTHROCYTE [DISTWIDTH] IN BLOOD BY AUTOMATED COUNT: 13.8 % (ref 11–15)
GLOBULIN PLAS-MCNC: 4 G/DL (ref 2.8–4.4)
GLUCOSE BLD-MCNC: 103 MG/DL (ref 70–99)
GLUCOSE BLDC GLUCOMTR-MCNC: 101 MG/DL (ref 70–99)
GLUCOSE BLDC GLUCOMTR-MCNC: 101 MG/DL (ref 70–99)
GLUCOSE BLDC GLUCOMTR-MCNC: 107 MG/DL (ref 70–99)
GLUCOSE BLDC GLUCOMTR-MCNC: 128 MG/DL (ref 70–99)
GLUCOSE BLDC GLUCOMTR-MCNC: 157 MG/DL (ref 70–99)
GLUCOSE BLDC GLUCOMTR-MCNC: 194 MG/DL (ref 70–99)
HCT VFR BLD AUTO: 41.1 %
HGB BLD-MCNC: 13.4 G/DL
INSULIN SERPL-ACNC: 10.9 MU/L (ref 3–25)
M PROTEIN MFR SERPL ELPH: 6.3 G/DL (ref 6.4–8.2)
MCH RBC QN AUTO: 32.4 PG (ref 26–34)
MCHC RBC AUTO-ENTMCNC: 32.6 G/DL (ref 31–37)
MCV RBC AUTO: 99.5 FL
OSMOLALITY SERPL CALC.SUM OF ELEC: 288 MOSM/KG (ref 275–295)
PLATELET # BLD AUTO: 107 10(3)UL (ref 150–450)
POTASSIUM SERPL-SCNC: 4 MMOL/L (ref 3.5–5.1)
RBC # BLD AUTO: 4.13 X10(6)UL
SODIUM SERPL-SCNC: 139 MMOL/L (ref 136–145)
T4 FREE SERPL-MCNC: 1.1 NG/DL (ref 0.8–1.7)
TSI SER-ACNC: 2.86 MIU/ML (ref 0.36–3.74)
WBC # BLD AUTO: 10.3 X10(3) UL (ref 4–11)

## 2021-07-25 PROCEDURE — 99233 SBSQ HOSP IP/OBS HIGH 50: CPT | Performed by: INTERNAL MEDICINE

## 2021-07-25 RX ORDER — POLYETHYLENE GLYCOL 3350 17 G/17G
17 POWDER, FOR SOLUTION ORAL DAILY
Status: DISCONTINUED | OUTPATIENT
Start: 2021-07-25 | End: 2021-07-26

## 2021-07-25 RX ORDER — DOCUSATE SODIUM 100 MG/1
100 CAPSULE, LIQUID FILLED ORAL DAILY
Status: DISCONTINUED | OUTPATIENT
Start: 2021-07-25 | End: 2021-07-26

## 2021-07-25 NOTE — PLAN OF CARE
Pt A/Ox3. IVF and IV antibiotics continued. Denies pain or nausea. Tolerating Clears. Voiding freely. Daily weights. Accuchecks q4. Up with 1 ad RW. No Bms this shift. Blanchable redness on heels. Blue boots. SCDs. Call light within reach.  Safety precauti with activity and pre-medicate as appropriate  Outcome: Progressing     Problem: SAFETY ADULT - FALL  Goal: Free from fall injury  Description: INTERVENTIONS:  - Assess pt frequently for physical needs  - Identify cognitive and physical deficits and behavi based on physician/LIP order or complex needs related to functional status, cognitive ability or social support system  Outcome: Progressing     Problem: METABOLIC/FLUID AND ELECTROLYTES - ADULT  Goal: Glucose maintained within prescribed range  Descriptio

## 2021-07-25 NOTE — PROGRESS NOTES
[facility] - [facility]    Progress Note    Mary Devine Patient Status:  Inpatient    1926 MRN [id number]   Location [facility] 4W/SW/SE Attending Jenn Hickman MD   Hosp Day # 2 PCP Lisa Lujan MD     HPI/Subjective:   Miguel Saldaña pancreatitis, unspecified complication status, unspecified pancreatitis type -discussed with gastroenterology.   Tolerating clear liquid diet and advance as tolerated if okay with gastroenterology  We will follow gastroenterology recommendation     Abnormal

## 2021-07-25 NOTE — PROGRESS NOTES
[facility] - [facility]    Progress Note    Delta Jmienez Patient Status:  Inpatient    1926 MRN [id number]   Location [facility] 4W/SW/SE Attending Olga Perez MD   Hosp Day # 1 PCP Morales Saavedra MD     HPI/Subjective:   Julienne Schuster pancreatitis, unspecified complication status, unspecified pancreatitis type -discussed with gastroenterology. We will start clear liquid diet and advance as tolerated.   Reduce the dose of fluids lipase is back to normal     Abnormal CT scan concerning fo

## 2021-07-25 NOTE — PROGRESS NOTES
[facility]    Progress Note    Erick Suarez Patient Status:  Inpatient    1926 MRN [id number]   Location [facility] 4W/SW/SE Attending Crystal Villa MD   Hosp Day # 2 PCP Brielle Eckert MD     Date of Admission:   Problem Relation Age of Onset   • Heart Attack Mother         CHF/CRF (cause of death)   • Diabetes Mother    • Hypertension Mother    • Heart Disease Sister         CAD (cause of death)   • Heart Attack Brother         CHF (cause of death)   • Cancer Br Release, Take 1 capsule (20 mg total) by mouth every morning. furosemide 20 MG Oral Tab, Take 1 tablet (20 mg total) by mouth daily as needed.  Only takes if gained 3 lbs overnight or 5 lbs in 1 week  amLODIPine Besylate 2.5 MG Oral Tab, Take 2.5 mg by sebastien 1.09 07/25/2021    BUN 13 07/25/2021     07/25/2021    K 4.0 07/25/2021     07/25/2021    CO2 30.0 07/25/2021     (H) 07/25/2021    CA 8.2 (L) 07/25/2021    ALB 2.3 (L) 07/25/2021    ALKPHO 115 07/25/2021    TP 6.3 (L) 07/25/2021    AST

## 2021-07-25 NOTE — PROGRESS NOTES
[facility] [address]     Gastroenterology Progress Note    Sherryle Clark Patient Status:  Inpatient    1926 MRN [id number]   Location [facility] 4W/SW/SE Attending Alex Billingsley MD   Hosp Day # 2 PCP MD Yoel Perez of the distal antrum suggesting a small penetrating ulcer.     # Acute pancreatitis suspect biliary, a/w possible early pseudocyst   # Cholelithiasis with distended gallbladder on CT c/f cholecystitis.  No biliary ductal dilation or obvious ductal stones on counseling/discussion of the above and additional time in coordination of care/discussion with care team.       Berna North MD  [facility], [facility] Gastroenterology      Results:     Lab Results   Component Value Date    WBC 10.3 07/25/2021    HGB 13.4 07/25/202

## 2021-07-25 NOTE — PLAN OF CARE
Diet advanced to low fat/cardiac, pt ordered solid foods for lunch and tolerated well. Denies nausea and epigastric pain. Reporting lower abdominal pain attributed to constipation, colace and miralax given. Voiding WNL. IVF infusing, zosyn, protonix gtt.  Ozell Meth

## 2021-07-26 VITALS
BODY MASS INDEX: 25.72 KG/M2 | RESPIRATION RATE: 18 BRPM | DIASTOLIC BLOOD PRESSURE: 49 MMHG | TEMPERATURE: 98 F | HEART RATE: 46 BPM | SYSTOLIC BLOOD PRESSURE: 119 MMHG | WEIGHT: 183.69 LBS | HEIGHT: 71 IN | OXYGEN SATURATION: 97 %

## 2021-07-26 LAB
ALBUMIN SERPL-MCNC: 2.2 G/DL (ref 3.4–5)
ALBUMIN/GLOB SERPL: 0.6 {RATIO} (ref 1–2)
ALP LIVER SERPL-CCNC: 120 U/L
ALT SERPL-CCNC: 44 U/L
ANION GAP SERPL CALC-SCNC: 8 MMOL/L (ref 0–18)
AST SERPL-CCNC: 44 U/L (ref 15–37)
BILIRUB SERPL-MCNC: 1.7 MG/DL (ref 0.1–2)
BUN BLD-MCNC: 14 MG/DL (ref 7–18)
BUN/CREAT SERPL: 13.5 (ref 10–20)
CALCIUM BLD-MCNC: 8.4 MG/DL (ref 8.5–10.1)
CHLORIDE SERPL-SCNC: 103 MMOL/L (ref 98–112)
CO2 SERPL-SCNC: 28 MMOL/L (ref 21–32)
CREAT BLD-MCNC: 1.04 MG/DL
DEPRECATED RDW RBC AUTO: 49.3 FL (ref 35.1–46.3)
ERYTHROCYTE [DISTWIDTH] IN BLOOD BY AUTOMATED COUNT: 13.6 % (ref 11–15)
GLOBULIN PLAS-MCNC: 4 G/DL (ref 2.8–4.4)
GLUCOSE BLD-MCNC: 81 MG/DL (ref 70–99)
GLUCOSE BLDC GLUCOMTR-MCNC: 177 MG/DL (ref 70–99)
GLUCOSE BLDC GLUCOMTR-MCNC: 81 MG/DL (ref 70–99)
HCT VFR BLD AUTO: 39.2 %
HGB BLD-MCNC: 12.9 G/DL
M PROTEIN MFR SERPL ELPH: 6.2 G/DL (ref 6.4–8.2)
MCH RBC QN AUTO: 32.4 PG (ref 26–34)
MCHC RBC AUTO-ENTMCNC: 32.9 G/DL (ref 31–37)
MCV RBC AUTO: 98.5 FL
OSMOLALITY SERPL CALC.SUM OF ELEC: 288 MOSM/KG (ref 275–295)
PLATELET # BLD AUTO: 104 10(3)UL (ref 150–450)
POTASSIUM SERPL-SCNC: 3.4 MMOL/L (ref 3.5–5.1)
RBC # BLD AUTO: 3.98 X10(6)UL
SODIUM SERPL-SCNC: 139 MMOL/L (ref 136–145)
WBC # BLD AUTO: 8.3 X10(3) UL (ref 4–11)

## 2021-07-26 PROCEDURE — 99239 HOSP IP/OBS DSCHRG MGMT >30: CPT | Performed by: INTERNAL MEDICINE

## 2021-07-26 PROCEDURE — 99232 SBSQ HOSP IP/OBS MODERATE 35: CPT | Performed by: PHYSICIAN ASSISTANT

## 2021-07-26 RX ORDER — FUROSEMIDE 10 MG/ML
10 INJECTION INTRAMUSCULAR; INTRAVENOUS ONCE
Status: COMPLETED | OUTPATIENT
Start: 2021-07-26 | End: 2021-07-26

## 2021-07-26 RX ORDER — AMOXICILLIN AND CLAVULANATE POTASSIUM 875; 125 MG/1; MG/1
1 TABLET, FILM COATED ORAL 2 TIMES DAILY
Qty: 14 TABLET | Refills: 0 | Status: SHIPPED | OUTPATIENT
Start: 2021-07-26 | End: 2021-08-02

## 2021-07-26 RX ORDER — POTASSIUM CHLORIDE 20 MEQ/1
40 TABLET, EXTENDED RELEASE ORAL EVERY 4 HOURS
Status: COMPLETED | OUTPATIENT
Start: 2021-07-26 | End: 2021-07-26

## 2021-07-26 RX ORDER — PANTOPRAZOLE SODIUM 40 MG/1
40 TABLET, DELAYED RELEASE ORAL
Qty: 60 TABLET | Refills: 0 | Status: SHIPPED | OUTPATIENT
Start: 2021-07-26 | End: 2021-08-18

## 2021-07-26 NOTE — SLP NOTE
SPEECH DAILY NOTE - INPATIENT    ASSESSMENT & PLAN   ASSESSMENT  PPE REQUIRED. THIS THERAPIST WORE GLOVES AND DROPLET MASK FOR DURATION OF TX SESSION. HANDS WASHED UPON ENTRANCE/EXIT.     SLP f/u for ongoing dysphagia tx/meal assessment per recommendations BASES: No visible pulmonary or pleural disease. Diet Recommendations - Solids: Regular  Diet Recommendations - Liquids: Thin Liquids (no straws)    Compensatory Strategies Recommended: Chin tuck; No straws; Slow rate;Small bites and sips  Aspiration Pr (Documentation Required): Yes  SLP Follow-up Date: 07/27/21  Number of Visits to Meet Established Goals:  (1-2)    Session: 1 following BSE    If you have any questions, please contact Aj Flynn 40 Pathologist  Ph

## 2021-07-26 NOTE — DISCHARGE SUMMARY
[facility]    Discharge Summary    Angelo Caban Patient Status:  Inpatient    1926 MRN [id number]   Location [facility] 4W/SW/SE Attending Steve Diaz MD   Hosp Day # 3 PCP Hallie Terrazas MD     Date of Admission: / day.  He also had hypoglycemia and subsequently endocrinology was consulted. His sugars got better as he started to eat better. He ambulated and family wants to take him home. They declined any invasive procedures.   I have discussed with gastroenterolog hepatic cirrhosis type ([facility] [address] [facility] -we will monitor     DVT prophylaxis with Eliquis     Plan  Okay for discharge.   Cleared by gastroenterology  Pantoprazole 40 mg twice daily for 8 weeks then convert back to omeprazole once a day (home dose  Augmentin  I will s fat    Discharge Activity: As tolerated    Follow up: Follow-up Information     REGINA Silva In 3 weeks.     Specialties: Nurse Practitioner, GASTROENTEROLOGY  Contact information:  [address]  [facility] [phone]

## 2021-07-26 NOTE — PLAN OF CARE
Patient alert and oriented x3. Up with standby assist. Cardiac low fat diet, tolerating well. Accuchecks ACHS. Protonix drip in place until this afternoon. Continuing IV antibiotics. IV lasix given. Plan for discharge home on oral antibiotics and protonix. pain and pain management  - Manage/alleviate anxiety  - Utilize distraction and/or relaxation techniques  - Monitor for opioid side effects  - Notify MD/LIP if interventions unsuccessful or patient reports new pain  - Anticipate increased pain with activit INTERVENTIONS:  - Maintain adequate hydration with IV or PO as ordered and tolerated  - Nasogastric tube to low intermittent suction as ordered  - Evaluate effectiveness of ordered antiemetic medications  - Provide nonpharmacologic comfort measures as appr

## 2021-07-26 NOTE — CM/SW NOTE
DC order present    Per chart review, family has declined home health initially. JOSI spoke with daughter, Zenaida Martinez. Zenaida Martinez declines home health. JOSI notified that if patient/family changes mind to reach out to PCP, Zenaida Martinez understood.  Family will transpor

## 2021-07-26 NOTE — PLAN OF CARE
MD informed of 8pm blood sugar, accuchecks changed from q4 to ACHS. No other complaints from patient. Tolerating diet without nausea/vomiting. Protonix gtt infusing. IV abx given. Up x1 assist and walker. Voiding freely. Plan for PT/OT consult today.  Plan Manage/alleviate anxiety  - Utilize distraction and/or relaxation techniques  - Monitor for opioid side effects  - Notify MD/LIP if interventions unsuccessful or patient reports new pain  - Anticipate increased pain with activity and pre-medicate as approp hydration with IV or PO as ordered and tolerated  - Nasogastric tube to low intermittent suction as ordered  - Evaluate effectiveness of ordered antiemetic medications  - Provide nonpharmacologic comfort measures as appropriate  - Advance diet as tolerated

## 2021-07-26 NOTE — PROGRESS NOTES
Mita King     Gastroenterology Progress Note    Yomijamee Martinez Patient Status:  Inpatient    1926 MRN [id number]   Location [facility] 4W/SW/SE Attending Shashank Brunner MD   Hosp Day # 3 PCP Rocio Johnson MD evaluation deferred d/t foci of air in lumen wall suggestive of microperf - high risk of perforation with air insufflation. Clinically doing well. Will complete 72 hours of PPI ggt today at noon, will order PO Pantoprazole BID x 8 weeks, f/u outpatient.  NO

## 2021-07-27 ENCOUNTER — PATIENT OUTREACH (OUTPATIENT)
Dept: CASE MANAGEMENT | Age: 86
End: 2021-07-27

## 2021-07-27 DIAGNOSIS — Z02.9 ENCOUNTERS FOR UNSPECIFIED ADMINISTRATIVE PURPOSE: ICD-10-CM

## 2021-07-27 LAB — C-PEPTIDE, SERUM OR PLASMA: 3.4 NG/ML

## 2021-07-27 PROCEDURE — 1111F DSCHRG MED/CURRENT MED MERGE: CPT

## 2021-07-28 ENCOUNTER — PATIENT MESSAGE (OUTPATIENT)
Dept: CASE MANAGEMENT | Age: 86
End: 2021-07-28

## 2021-07-28 NOTE — PROGRESS NOTES
LM for pt's dtr, Kameron De La Torre per HIPPA, to call [place] for TCM since discharge. [facility] phone number was provided for pt to call back.

## 2021-07-29 ENCOUNTER — TELEPHONE (OUTPATIENT)
Dept: INTERNAL MEDICINE CLINIC | Facility: CLINIC | Age: 86
End: 2021-07-29

## 2021-07-29 NOTE — PROGRESS NOTES
Initial Post Discharge Follow Up   Discharge Date: 7/26/21  Contact Date: 7/27/2021    Consent Verification:  Assessment Completed With: Caregiver: Ivette kohler Permission received per patient?  written  HIPAA Verified?   Yes    Discharge Dx:   [facility]ute [facility] was your diagnoses explained to you? Yes  • Do you have any questions about your diagnoses?  No  • Are you able to perform normal daily activities of living as you have prior to your hospital stay (dressing, bathing, ambulating to the bathroom, etc medication’s purpose & side effects reviewed? yes  o Do you have any questions about your new medication?  No  • Did you  your discharge medications when you left the hospital? Yes  • May I go over your medications with you to make sure we are not mi f/u with GI and states she will call to schedule this but would like his other sx addressed first.     Is there any reason as to why you cannot make your appointments?    No     NCM Reviewed upcoming Specialist Appt with patient     No     Overall Rating:

## 2021-07-29 NOTE — TELEPHONE ENCOUNTER
Spoke to pt's dtr for TCM today. Pt does not have HFU appt scheduled at this time. Unable to book appt for pt due to schedule limitations. TCM/HFU appt recommended by 8/2/21 as pt is a high risk for readmission. Please advise.     BOOK BY DATE (last date

## 2021-07-29 NOTE — PROGRESS NOTES
SAMM got in touch with JOSE RAMON-Dr. Kenan Edgar office, [place]. Advised of dtr's concerns and pt's sx post-discharge as stated in previous note.   Advised Edmundo Mace that dtr was instructed to take pt to ER but declined stating cardiology stated to contact them first

## 2021-07-31 ENCOUNTER — TELEPHONE (OUTPATIENT)
Dept: INTERNAL MEDICINE CLINIC | Facility: CLINIC | Age: 86
End: 2021-07-31

## 2021-07-31 DIAGNOSIS — E87.6 HYPOKALEMIA: Primary | ICD-10-CM

## 2021-07-31 RX ORDER — POTASSIUM CHLORIDE 1500 MG/1
20 TABLET, FILM COATED, EXTENDED RELEASE ORAL EVERY MORNING
Qty: 30 TABLET | Refills: 0 | Status: SHIPPED | OUTPATIENT
Start: 2021-07-31 | End: 2021-09-21

## 2021-07-31 NOTE — TELEPHONE ENCOUNTER
Daughter Robyn Moses called from [phone] regarding patient. Patient has been using Lasix without the potassium after discharge to help with edema. The edema is improving. But he has been feeling a little weak.   She said he has been eating normally n

## 2021-08-02 ENCOUNTER — LAB ENCOUNTER (OUTPATIENT)
Dept: LAB | Age: 86
End: 2021-08-02
Attending: INTERNAL MEDICINE
Payer: MEDICARE

## 2021-08-02 ENCOUNTER — OFFICE VISIT (OUTPATIENT)
Dept: INTERNAL MEDICINE CLINIC | Facility: CLINIC | Age: 86
End: 2021-08-02
Payer: MEDICARE

## 2021-08-02 VITALS
RESPIRATION RATE: 16 BRPM | HEART RATE: 54 BPM | HEIGHT: 71 IN | BODY MASS INDEX: 24.36 KG/M2 | WEIGHT: 174 LBS | OXYGEN SATURATION: 97 % | SYSTOLIC BLOOD PRESSURE: 132 MMHG | DIASTOLIC BLOOD PRESSURE: 62 MMHG

## 2021-08-02 DIAGNOSIS — K85.10 ACUTE BILIARY PANCREATITIS, UNSPECIFIED COMPLICATION STATUS: ICD-10-CM

## 2021-08-02 DIAGNOSIS — K25.3 ACUTE GASTRIC ULCER WITHOUT HEMORRHAGE OR PERFORATION: ICD-10-CM

## 2021-08-02 DIAGNOSIS — K25.3 ACUTE GASTRIC ULCER WITHOUT HEMORRHAGE OR PERFORATION: Primary | ICD-10-CM

## 2021-08-02 DIAGNOSIS — E87.6 HYPOKALEMIA: ICD-10-CM

## 2021-08-02 LAB
ALBUMIN SERPL-MCNC: 2.8 G/DL (ref 3.4–5)
ALBUMIN/GLOB SERPL: 0.6 {RATIO} (ref 1–2)
ALP LIVER SERPL-CCNC: 128 U/L
ALT SERPL-CCNC: 32 U/L
ANION GAP SERPL CALC-SCNC: 6 MMOL/L (ref 0–18)
AST SERPL-CCNC: 33 U/L (ref 15–37)
BILIRUB SERPL-MCNC: 0.8 MG/DL (ref 0.1–2)
BUN BLD-MCNC: 20 MG/DL (ref 7–18)
BUN/CREAT SERPL: 18.7 (ref 10–20)
CALCIUM BLD-MCNC: 9 MG/DL (ref 8.5–10.1)
CHLORIDE SERPL-SCNC: 106 MMOL/L (ref 98–112)
CO2 SERPL-SCNC: 27 MMOL/L (ref 21–32)
CREAT BLD-MCNC: 1.07 MG/DL
DEPRECATED RDW RBC AUTO: 50.6 FL (ref 35.1–46.3)
ERYTHROCYTE [DISTWIDTH] IN BLOOD BY AUTOMATED COUNT: 13.6 % (ref 11–15)
GLOBULIN PLAS-MCNC: 4.5 G/DL (ref 2.8–4.4)
GLUCOSE BLD-MCNC: 114 MG/DL (ref 70–99)
HAV IGM SER QL: 1.9 MG/DL (ref 1.6–2.6)
HCT VFR BLD AUTO: 40 %
HGB BLD-MCNC: 13 G/DL
M PROTEIN MFR SERPL ELPH: 7.3 G/DL (ref 6.4–8.2)
MCH RBC QN AUTO: 32.7 PG (ref 26–34)
MCHC RBC AUTO-ENTMCNC: 32.5 G/DL (ref 31–37)
MCV RBC AUTO: 100.5 FL
OSMOLALITY SERPL CALC.SUM OF ELEC: 291 MOSM/KG (ref 275–295)
PATIENT FASTING Y/N/NP: NO
PLATELET # BLD AUTO: 169 10(3)UL (ref 150–450)
POTASSIUM SERPL-SCNC: 3.9 MMOL/L (ref 3.5–5.1)
RBC # BLD AUTO: 3.98 X10(6)UL
SODIUM SERPL-SCNC: 139 MMOL/L (ref 136–145)
WBC # BLD AUTO: 10.2 X10(3) UL (ref 4–11)

## 2021-08-02 PROCEDURE — 1111F DSCHRG MED/CURRENT MED MERGE: CPT | Performed by: INTERNAL MEDICINE

## 2021-08-02 PROCEDURE — 36415 COLL VENOUS BLD VENIPUNCTURE: CPT

## 2021-08-02 PROCEDURE — 99496 TRANSJ CARE MGMT HIGH F2F 7D: CPT | Performed by: INTERNAL MEDICINE

## 2021-08-02 PROCEDURE — 83735 ASSAY OF MAGNESIUM: CPT

## 2021-08-02 PROCEDURE — 85027 COMPLETE CBC AUTOMATED: CPT

## 2021-08-02 PROCEDURE — 80053 COMPREHEN METABOLIC PANEL: CPT

## 2021-08-10 ENCOUNTER — TELEPHONE (OUTPATIENT)
Dept: GASTROENTEROLOGY | Facility: CLINIC | Age: 86
End: 2021-08-10

## 2021-08-10 NOTE — TELEPHONE ENCOUNTER
Debbie Cadet,     Patients daughter requesting sooner hospital follow up appointment than next available (09/22/2021). Also inquiring if video visit can potentially be considered. Please advise if appropriate to utilize res 24 slot sept 8 or 9th.      Thank y

## 2021-08-10 NOTE — TELEPHONE ENCOUNTER
----- Message -----   From: Marely Valencia   Sent: 8/10/2021  10:35 AM CDT   To: Adelaida Farris Clinical Staff   Subject: FW: Appointment Request                            Patient's daughter requesting sooner appointment. David  call. Darrian schmid.   ----- Maribeth

## 2021-08-10 NOTE — TELEPHONE ENCOUNTER
Nursing: Okay to use one of the research 24 spots. Unfortunately, I do not have the ability of doing video visits at this time.

## 2021-08-10 NOTE — TELEPHONE ENCOUNTER
Demetrio Calderon,     Scheduled patient for clinic visit 09/08/2021. Patients daughter, Kameron De La Torre (ok per Anastasiya Mathews) wants to assure aprn is comfortable with that date since that'll be 6 weeks from post discharge.      Informed daughter will notify if sooner appointment

## 2021-08-10 NOTE — TELEPHONE ENCOUNTER
Contacted Christiano Teague to relay aprn message below as to confirm patient is stable in condition. Patients daughter assured Florentino Cordoba is feeling well since discharge but will contact our office if any new symptoms and/or decline in condition.      Appointment de

## 2021-08-18 RX ORDER — PANTOPRAZOLE SODIUM 40 MG/1
40 TABLET, DELAYED RELEASE ORAL
Qty: 60 TABLET | Refills: 0 | Status: SHIPPED | OUTPATIENT
Start: 2021-08-18 | End: 2021-09-17

## 2021-08-18 NOTE — TELEPHONE ENCOUNTER
Daughter, is requesting a refill on the patient's pantoprazole medication.  Daughter, states that this was originally prescribed from a different doctor when the patient was admitted in the hospital. Pharmacy: [facility]/[place], [place] (listed)     Carisa Garza

## 2021-08-20 RX ORDER — PANTOPRAZOLE SODIUM 40 MG/1
TABLET, DELAYED RELEASE ORAL
Qty: 60 TABLET | Refills: 0 | OUTPATIENT
Start: 2021-08-20

## 2021-08-23 ENCOUNTER — APPOINTMENT (OUTPATIENT)
Dept: GENERAL RADIOLOGY | Facility: HOSPITAL | Age: 86
DRG: 440 | End: 2021-08-23
Attending: EMERGENCY MEDICINE
Payer: MEDICARE

## 2021-08-23 ENCOUNTER — APPOINTMENT (OUTPATIENT)
Dept: CT IMAGING | Facility: HOSPITAL | Age: 86
DRG: 440 | End: 2021-08-23
Attending: EMERGENCY MEDICINE
Payer: MEDICARE

## 2021-08-23 ENCOUNTER — NURSE TRIAGE (OUTPATIENT)
Dept: FAMILY MEDICINE CLINIC | Facility: CLINIC | Age: 86
End: 2021-08-23

## 2021-08-23 ENCOUNTER — APPOINTMENT (OUTPATIENT)
Dept: CT IMAGING | Facility: HOSPITAL | Age: 86
DRG: 440 | End: 2021-08-23
Payer: MEDICARE

## 2021-08-23 ENCOUNTER — HOSPITAL ENCOUNTER (INPATIENT)
Facility: HOSPITAL | Age: 86
LOS: 2 days | Discharge: HOME | DRG: 440 | End: 2021-08-25
Admitting: INTERNAL MEDICINE
Payer: MEDICARE

## 2021-08-23 DIAGNOSIS — K85.90 ACUTE PANCREATITIS, UNSPECIFIED COMPLICATION STATUS, UNSPECIFIED PANCREATITIS TYPE: Primary | ICD-10-CM

## 2021-08-23 LAB
ALBUMIN SERPL-MCNC: 3.2 G/DL (ref 3.4–5)
ALP LIVER SERPL-CCNC: 188 U/L
ALT SERPL-CCNC: 85 U/L
ANION GAP SERPL CALC-SCNC: 5 MMOL/L (ref 0–18)
AST SERPL-CCNC: 100 U/L (ref 15–37)
BASOPHILS # BLD AUTO: 0.02 X10(3) UL (ref 0–0.2)
BASOPHILS NFR BLD AUTO: 0.2 %
BILIRUB DIRECT SERPL-MCNC: 0.7 MG/DL (ref 0–0.2)
BILIRUB SERPL-MCNC: 1.7 MG/DL (ref 0.1–2)
BILIRUB UR QL: NEGATIVE
BUN BLD-MCNC: 24 MG/DL (ref 7–18)
BUN/CREAT SERPL: 18.6 (ref 10–20)
CALCIUM BLD-MCNC: 9.6 MG/DL (ref 8.5–10.1)
CHLORIDE SERPL-SCNC: 104 MMOL/L (ref 98–112)
CLARITY UR: CLEAR
CO2 SERPL-SCNC: 28 MMOL/L (ref 21–32)
COLOR UR: YELLOW
CREAT BLD-MCNC: 1.29 MG/DL
DEPRECATED RDW RBC AUTO: 51.9 FL (ref 35.1–46.3)
EOSINOPHIL # BLD AUTO: 0.03 X10(3) UL (ref 0–0.7)
EOSINOPHIL NFR BLD AUTO: 0.2 %
ERYTHROCYTE [DISTWIDTH] IN BLOOD BY AUTOMATED COUNT: 14.2 % (ref 11–15)
GLUCOSE BLD-MCNC: 99 MG/DL (ref 70–99)
GLUCOSE UR-MCNC: NEGATIVE MG/DL
HCT VFR BLD AUTO: 45.7 %
HGB BLD-MCNC: 14.9 G/DL
HGB UR QL STRIP.AUTO: NEGATIVE
IMM GRANULOCYTES # BLD AUTO: 0.04 X10(3) UL (ref 0–1)
IMM GRANULOCYTES NFR BLD: 0.3 %
KETONES UR-MCNC: NEGATIVE MG/DL
LEUKOCYTE ESTERASE UR QL STRIP.AUTO: NEGATIVE
LIPASE SERPL-CCNC: 4299 U/L (ref 73–393)
LYMPHOCYTES # BLD AUTO: 1.23 X10(3) UL (ref 1–4)
LYMPHOCYTES NFR BLD AUTO: 10.1 %
M PROTEIN MFR SERPL ELPH: 8 G/DL (ref 6.4–8.2)
MCH RBC QN AUTO: 32.7 PG (ref 26–34)
MCHC RBC AUTO-ENTMCNC: 32.6 G/DL (ref 31–37)
MCV RBC AUTO: 100.4 FL
MONOCYTES # BLD AUTO: 1.17 X10(3) UL (ref 0.1–1)
MONOCYTES NFR BLD AUTO: 9.6 %
NEUTROPHILS # BLD AUTO: 9.69 X10 (3) UL (ref 1.5–7.7)
NEUTROPHILS # BLD AUTO: 9.69 X10(3) UL (ref 1.5–7.7)
NEUTROPHILS NFR BLD AUTO: 79.6 %
NITRITE UR QL STRIP.AUTO: NEGATIVE
OSMOLALITY SERPL CALC.SUM OF ELEC: 288 MOSM/KG (ref 275–295)
PH UR: 6 [PH] (ref 5–8)
PLATELET # BLD AUTO: 117 10(3)UL (ref 150–450)
POTASSIUM SERPL-SCNC: 3.9 MMOL/L (ref 3.5–5.1)
PROT UR-MCNC: 30 MG/DL
RBC # BLD AUTO: 4.55 X10(6)UL
SARS-COV-2 RNA RESP QL NAA+PROBE: NOT DETECTED
SODIUM SERPL-SCNC: 137 MMOL/L (ref 136–145)
SP GR UR STRIP: >1.03 (ref 1–1.03)
TROPONIN I SERPL-MCNC: <0.045 NG/ML (ref ?–0.04)
UROBILINOGEN UR STRIP-ACNC: 4
WBC # BLD AUTO: 12.2 X10(3) UL (ref 4–11)

## 2021-08-23 PROCEDURE — 71045 X-RAY EXAM CHEST 1 VIEW: CPT | Performed by: EMERGENCY MEDICINE

## 2021-08-23 PROCEDURE — 70450 CT HEAD/BRAIN W/O DYE: CPT

## 2021-08-23 PROCEDURE — 74177 CT ABD & PELVIS W/CONTRAST: CPT | Performed by: EMERGENCY MEDICINE

## 2021-08-23 RX ORDER — ALLOPURINOL 300 MG/1
300 TABLET ORAL DAILY
Status: DISCONTINUED | OUTPATIENT
Start: 2021-08-24 | End: 2021-08-25

## 2021-08-23 RX ORDER — PANTOPRAZOLE SODIUM 40 MG/1
40 TABLET, DELAYED RELEASE ORAL
Status: DISCONTINUED | OUTPATIENT
Start: 2021-08-23 | End: 2021-08-25

## 2021-08-23 RX ORDER — AMLODIPINE BESYLATE 2.5 MG/1
2.5 TABLET ORAL
Status: DISCONTINUED | OUTPATIENT
Start: 2021-08-23 | End: 2021-08-25

## 2021-08-23 RX ORDER — TAMSULOSIN HYDROCHLORIDE 0.4 MG/1
0.4 CAPSULE ORAL EVERY EVENING
Status: DISCONTINUED | OUTPATIENT
Start: 2021-08-23 | End: 2021-08-25

## 2021-08-23 RX ORDER — SODIUM CHLORIDE, SODIUM LACTATE, POTASSIUM CHLORIDE, CALCIUM CHLORIDE 600; 310; 30; 20 MG/100ML; MG/100ML; MG/100ML; MG/100ML
INJECTION, SOLUTION INTRAVENOUS CONTINUOUS
Status: DISCONTINUED | OUTPATIENT
Start: 2021-08-23 | End: 2021-08-24

## 2021-08-23 RX ORDER — MULTIPLE VITAMINS W/ MINERALS TAB 9MG-400MCG
1 TAB ORAL DAILY
Status: DISCONTINUED | OUTPATIENT
Start: 2021-08-24 | End: 2021-08-25

## 2021-08-23 RX ORDER — ONDANSETRON 2 MG/ML
4 INJECTION INTRAMUSCULAR; INTRAVENOUS EVERY 4 HOURS PRN
Status: DISCONTINUED | OUTPATIENT
Start: 2021-08-23 | End: 2021-08-25

## 2021-08-23 RX ORDER — ACETAMINOPHEN 500 MG
500 TABLET ORAL EVERY 6 HOURS PRN
Status: DISCONTINUED | OUTPATIENT
Start: 2021-08-23 | End: 2021-08-25

## 2021-08-23 RX ORDER — OMEGA-3S/DHA/EPA/FISH OIL 1000-1400
CAPSULE,DELAYED RELEASE (ENTERIC COATED) ORAL
Status: DISCONTINUED | OUTPATIENT
Start: 2021-08-24 | End: 2021-08-23 | Stop reason: RX

## 2021-08-23 NOTE — ED PROVIDER NOTES
Patient Seen in: [facility] AND [facility] Emergency Department      History   Patient presents with:  Abdomen/Flank Pain    Stated Complaint: abdominal pain with dizziness     HPI/Subjective:   HPI    80year old male with multiple medical issues including kidn Forearm x 2   • CARPAL TUNNEL RELEASE Right 2009   • COLONOSCOPY  2000   • EXC SKIN MALIG 2.1-3CM FACE,FACIAL Right 02/12/2020   • HIP REPLACEMENT SURGERY Left 01/05/2000   • LAMINECTOMY  2000   • LAPAROSCOPY, SURGICAL; PARTIAL NEPHRECTOMY Right    • RE Abdominal:      General: There is no distension. Palpations: Abdomen is soft. Tenderness: There is abdominal tenderness in the epigastric area and left upper quadrant. There is no guarding. Musculoskeletal:         General: No tenderness.  Nor Narrative: The following orders were created for panel order CBC With Differential With Platelet.   Procedure                               Abnormality         Status                     ---------                               -----------         ------ of acute cholecystitis. Multiple other incidental findings as described in the body of the report which are unchanged.     Dictated by (CST): Greg Freeman MD on 8/23/2021 at 6:16 PM     Finalized by (CST): Greg Freeman MD on 8/23/2021 at 6:21 PM          X ICD-10-CM Noted POA    * (Principal) Acute pancreatitis, unspecified complication status, unspecified pancreatitis type K85.90 7/23/2021 Unknown

## 2021-08-23 NOTE — TELEPHONE ENCOUNTER
Daughter indicated that patient is currently at [facility] but they are very busy that patient is not even in a room yet but in the hallway.  Advised daughter that it looks like they ordered a CT scan of the brain and that it is important to stay there to compl

## 2021-08-23 NOTE — TELEPHONE ENCOUNTER
Action Requested: Summary for Provider     []  Critical Lab, Recommendations Needed  [] Need Additional Advice  [x]   FYI    []   Need Orders  [] Need Medications Sent to Pharmacy  []  Other     SUMMARY: Per protocol advised patient to be assessed in the e

## 2021-08-23 NOTE — ED INITIAL ASSESSMENT (HPI)
Patient notes pain to left chest and lower yesterday. Notes nausea yesterday as well. Hx of pancreatitis and notes pain was similar yesterday     Notes hitting head potentially on the wall yesterday in the middle of the night.

## 2021-08-24 LAB
ALBUMIN SERPL-MCNC: 2.7 G/DL (ref 3.4–5)
ALBUMIN/GLOB SERPL: 0.7 {RATIO} (ref 1–2)
ALP LIVER SERPL-CCNC: 142 U/L
ALT SERPL-CCNC: 55 U/L
ANION GAP SERPL CALC-SCNC: 7 MMOL/L (ref 0–18)
AST SERPL-CCNC: 57 U/L (ref 15–37)
BILIRUB SERPL-MCNC: 1.9 MG/DL (ref 0.1–2)
BUN BLD-MCNC: 20 MG/DL (ref 7–18)
BUN/CREAT SERPL: 19.6 (ref 10–20)
CALCIUM BLD-MCNC: 8.7 MG/DL (ref 8.5–10.1)
CHLORIDE SERPL-SCNC: 107 MMOL/L (ref 98–112)
CO2 SERPL-SCNC: 25 MMOL/L (ref 21–32)
CREAT BLD-MCNC: 1.02 MG/DL
DEPRECATED RDW RBC AUTO: 51.8 FL (ref 35.1–46.3)
ERYTHROCYTE [DISTWIDTH] IN BLOOD BY AUTOMATED COUNT: 14.1 % (ref 11–15)
GLOBULIN PLAS-MCNC: 4 G/DL (ref 2.8–4.4)
GLUCOSE BLD-MCNC: 91 MG/DL (ref 70–99)
HCT VFR BLD AUTO: 41.1 %
HGB BLD-MCNC: 13.4 G/DL
INR BLD: 2.51 (ref 0.9–1.2)
LIPASE SERPL-CCNC: 869 U/L (ref 73–393)
M PROTEIN MFR SERPL ELPH: 6.7 G/DL (ref 6.4–8.2)
MCH RBC QN AUTO: 32.4 PG (ref 26–34)
MCHC RBC AUTO-ENTMCNC: 32.6 G/DL (ref 31–37)
MCV RBC AUTO: 99.5 FL
OSMOLALITY SERPL CALC.SUM OF ELEC: 290 MOSM/KG (ref 275–295)
PLATELET # BLD AUTO: 87 10(3)UL (ref 150–450)
POTASSIUM SERPL-SCNC: 3.8 MMOL/L (ref 3.5–5.1)
PROTHROMBIN TIME: 26.8 SECONDS (ref 11.8–14.5)
RBC # BLD AUTO: 4.13 X10(6)UL
SODIUM SERPL-SCNC: 139 MMOL/L (ref 136–145)
WBC # BLD AUTO: 10.4 X10(3) UL (ref 4–11)

## 2021-08-24 PROCEDURE — 99222 1ST HOSP IP/OBS MODERATE 55: CPT | Performed by: PHYSICIAN ASSISTANT

## 2021-08-24 PROCEDURE — 99222 1ST HOSP IP/OBS MODERATE 55: CPT | Performed by: INTERNAL MEDICINE

## 2021-08-24 RX ORDER — POLYETHYLENE GLYCOL 3350 17 G/17G
17 POWDER, FOR SOLUTION ORAL DAILY
Status: DISCONTINUED | OUTPATIENT
Start: 2021-08-24 | End: 2021-08-25

## 2021-08-24 NOTE — ED QUICK NOTES
Orders for admission, patient is aware of plan and ready to go upstairs. Any questions, please call ED JAMIA kat  at extension [phone].    Type of COVID test sent:Rapid  COVID Suspicion level: Low    Titratable drug(s) infusing: NS bolus  Rate:    LOC at time o

## 2021-08-24 NOTE — CONSULTS
[facility] [address]   Gastroenterology Consultation Note    Ayana Mitchell  Patient Status:    Inpatient  Date of Admission:         8/23/2021, Hospital day #1  Attending:   Oneal Aragon MD  PCP:     Edy Butler MD    [facility]) hitting his head in the bathroom. Due to intermittent abdominal pain and the fall on anti-coagulation, patient presented to the ER. His last BM was 3 days ago - states he is \"due for one\" today. No reported hematochezia or melena at home.      Currentl Right 02/12/2020     Family History   Problem Relation Age of Onset   • Heart Attack Mother         CHF/CRF (cause of death)   • Diabetes Mother    • Hypertension Mother    • Heart Disease Sister         CAD (cause of death)   • Heart Attack Brother +dizziness   BEHAVIOR/PSYCH:  negative for psychotic behavior    Physical Exam:    Blood pressure 149/60, pulse 65, temperature 98.1 °F (36.7 °C), temperature source Oral, resp. rate 18, height 5' 11\" (1.803 m), weight 165 lb 8 oz (75.1 kg), SpO2 97 %.  Thao Arias morphology, unchanged. Diverticulosis without diverticulitis. Large amount of excessive stool seen throughout the colon suggestive of constipation. No obstruction.   Gallbladder sludge versus tiny layering stones without CT evidence of acute cholecystitis pancreatic tail as well as mild adjacent inflammation and large amount of stool in the colon. # acute pancreatitis: very mild inflammation seen on CT, although lipase elevated >3xULN and with location of pain similar to prior.  It is possible he has mil

## 2021-08-24 NOTE — PLAN OF CARE
From home with daughter, s/p fall at home, CT brain no acute findings. Lipase elevated. On IVF at 80 ml/hr. NPO. Labs in am. Continue to monitor.     Problem: Patient Centered Care  Goal: Patient preferences are identified and integrated in the patient's pl pain with activity and pre-medicate as appropriate  Outcome: Progressing     Problem: SAFETY ADULT - FALL  Goal: Free from fall injury  Description: INTERVENTIONS:  - Assess pt frequently for physical needs  - Identify cognitive and physical deficits and b during mobilization  - Obtain PT/OT consults as needed  - Advance activity as appropriate  - Communicate ordered activity level and limitations with patient/family  Outcome: Progressing

## 2021-08-24 NOTE — H&P
[facility]    History and Physical    [facility] Patient Status:  Inpatient    1926 MRN [id number]   Location [facility] 5SW/SE Attending Zach Go MD   Hosp Day # 1 PCP Cristi Hubbard MD     Date:  2021  Date • SCC (squamous cell carcinoma) 2019    right forehead - SCC in situ   • SCC (squamous cell carcinoma) 05/2020    L forearm x 2   • Stenosis of aorta     per daughter     Past Surgical History:   Procedure Laterality Date   • BIOPSY OF SKIN LESION Left 0 )  ALLOPURINOL 300 MG Oral Tab, Take 1 tablet by mouth once daily  furosemide 20 MG Oral Tab, Take 1 tablet (20 mg total) by mouth daily as needed.  Only takes if gained 3 lbs overnight or 5 lbs in 1 week  apixaban 5 MG Oral Tab, Take 1 tablet (5 mg total) oriented to person, place, and time. No cranial nerve deficit or motor deficit. Skin: Skin is warm.    Psychiatric: His behavior is normal.         Results:     Lab Results   Component Value Date    WBC 10.4 08/24/2021    HGB 13.4 08/24/2021    HCT 41.1 0 cholecystitis. Multiple other incidental findings as described in the body of the report which are unchanged.     Dictated by (CST): Lesvia Wallis MD on 8/23/2021 at 6:16 PM     Finalized by (CST): Lesvia Wallis MD on 8/23/2021 at 6:21 PM          XR CHEST A monitor     DVT prophylaxis with Eliquis    Plan  Discussed with the gastroenterology service-we will hold off antibiotics, clear liquid diet and advance diet as tolerated  Can stop IV fluids if tolerating diet  Physical therapy  If he continues to do well

## 2021-08-24 NOTE — TELEPHONE ENCOUNTER
to Hosp-Admission  Current   8/23/2021 - present (1 day)  Ahmet Miranda MD  Last attending • Treatment team  Acute pancreatitis, unspecified complication status, unspecified pancreatitis type  Principal problem

## 2021-08-25 VITALS
OXYGEN SATURATION: 98 % | HEART RATE: 60 BPM | SYSTOLIC BLOOD PRESSURE: 124 MMHG | RESPIRATION RATE: 18 BRPM | WEIGHT: 165.5 LBS | BODY MASS INDEX: 23.17 KG/M2 | DIASTOLIC BLOOD PRESSURE: 54 MMHG | TEMPERATURE: 98 F | HEIGHT: 71 IN

## 2021-08-25 LAB
ALBUMIN SERPL-MCNC: 2.4 G/DL (ref 3.4–5)
ALBUMIN/GLOB SERPL: 0.6 {RATIO} (ref 1–2)
ALP LIVER SERPL-CCNC: 135 U/L
ALT SERPL-CCNC: 42 U/L
ANION GAP SERPL CALC-SCNC: 5 MMOL/L (ref 0–18)
AST SERPL-CCNC: 36 U/L (ref 15–37)
BASOPHILS # BLD AUTO: 0.02 X10(3) UL (ref 0–0.2)
BASOPHILS NFR BLD AUTO: 0.2 %
BILIRUB SERPL-MCNC: 1.8 MG/DL (ref 0.1–2)
BUN BLD-MCNC: 23 MG/DL (ref 7–18)
BUN/CREAT SERPL: 22.5 (ref 10–20)
CALCIUM BLD-MCNC: 8.3 MG/DL (ref 8.5–10.1)
CHLORIDE SERPL-SCNC: 105 MMOL/L (ref 98–112)
CHOLEST SMN-MCNC: 91 MG/DL (ref ?–200)
CO2 SERPL-SCNC: 27 MMOL/L (ref 21–32)
CREAT BLD-MCNC: 1.02 MG/DL
DEPRECATED RDW RBC AUTO: 52.7 FL (ref 35.1–46.3)
EOSINOPHIL # BLD AUTO: 0.11 X10(3) UL (ref 0–0.7)
EOSINOPHIL NFR BLD AUTO: 1.2 %
ERYTHROCYTE [DISTWIDTH] IN BLOOD BY AUTOMATED COUNT: 14.4 % (ref 11–15)
GLOBULIN PLAS-MCNC: 4.1 G/DL (ref 2.8–4.4)
GLUCOSE BLD-MCNC: 109 MG/DL (ref 70–99)
HCT VFR BLD AUTO: 38.7 %
HDLC SERPL-MCNC: 38 MG/DL (ref 40–59)
HGB BLD-MCNC: 12.9 G/DL
IMM GRANULOCYTES # BLD AUTO: 0.02 X10(3) UL (ref 0–1)
IMM GRANULOCYTES NFR BLD: 0.2 %
LDLC SERPL CALC-MCNC: 41 MG/DL (ref ?–100)
LIPASE SERPL-CCNC: 215 U/L (ref 73–393)
LYMPHOCYTES # BLD AUTO: 0.91 X10(3) UL (ref 1–4)
LYMPHOCYTES NFR BLD AUTO: 10 %
M PROTEIN MFR SERPL ELPH: 6.5 G/DL (ref 6.4–8.2)
MCH RBC QN AUTO: 32.8 PG (ref 26–34)
MCHC RBC AUTO-ENTMCNC: 33.3 G/DL (ref 31–37)
MCV RBC AUTO: 98.5 FL
MONOCYTES # BLD AUTO: 1.17 X10(3) UL (ref 0.1–1)
MONOCYTES NFR BLD AUTO: 12.9 %
NEUTROPHILS # BLD AUTO: 6.87 X10 (3) UL (ref 1.5–7.7)
NEUTROPHILS # BLD AUTO: 6.87 X10(3) UL (ref 1.5–7.7)
NEUTROPHILS NFR BLD AUTO: 75.5 %
NONHDLC SERPL-MCNC: 53 MG/DL (ref ?–130)
OSMOLALITY SERPL CALC.SUM OF ELEC: 288 MOSM/KG (ref 275–295)
PLATELET # BLD AUTO: 75 10(3)UL (ref 150–450)
POTASSIUM SERPL-SCNC: 3.9 MMOL/L (ref 3.5–5.1)
RBC # BLD AUTO: 3.93 X10(6)UL
SODIUM SERPL-SCNC: 137 MMOL/L (ref 136–145)
TRIGL SERPL-MCNC: 49 MG/DL (ref 30–149)
VLDLC SERPL CALC-MCNC: 7 MG/DL (ref 0–30)
WBC # BLD AUTO: 9.1 X10(3) UL (ref 4–11)

## 2021-08-25 PROCEDURE — 99239 HOSP IP/OBS DSCHRG MGMT >30: CPT | Performed by: INTERNAL MEDICINE

## 2021-08-25 PROCEDURE — 99232 SBSQ HOSP IP/OBS MODERATE 35: CPT | Performed by: PHYSICIAN ASSISTANT

## 2021-08-25 NOTE — PROGRESS NOTES
Discharge RN Summary: Patient has discharge order in. Patient to discharge home. IV removed by this RN. Daughter at bedside for dc teaching  Understands to follow up with PCP in 1 week. Patient understands no new meds.        Scripts sent with pt: none  Sona

## 2021-08-25 NOTE — PLAN OF CARE
Problem: Patient Centered Care  Goal: Patient preferences are identified and integrated in the patient's plan of care  Description: Interventions:  - What would you like us to know as we care for you? From home with daughter, Karli Kramer.   - Provide timely, FALL  Goal: Free from fall injury  Description: INTERVENTIONS:  - Assess pt frequently for physical needs  - Identify cognitive and physical deficits and behaviors that affect risk of falls.   - [place] fall precautions as indicated by assessment.  - Educ Communicate ordered activity level and limitations with patient/family  Outcome: Progressing     No acute changes during the shift. Vital signs are stable. PT/OT seen patient, no needs required at this time. Daughter at bedside. Plan for DC today.

## 2021-08-25 NOTE — DISCHARGE SUMMARY
[facility]    Discharge Summary    Beena Vargas Patient Status:  Inpatient    1926 MRN [id number]   Location [facility] 5SW/SE Attending Alina Hyde MD   Hosp Day # 2 PCP Emiliano Lovelace MD     Date of Admission: 20 amlodipine     Dyslipidemia on statins       Cirrhosis of liver without ascites, unspecified hepatic cirrhosis type ([facility] [address] -we will monitor          Plan  I have communicated with the gastroenterology-patient has been cleared for discharge.   Patient was francesca tablet by mouth once daily    furosemide 20 MG Oral Tab  Take 1 tablet (20 mg total) by mouth daily as needed. Only takes if gained 3 lbs overnight or 5 lbs in 1 week    apixaban 5 MG Oral Tab  Take 1 tablet (5 mg total) by mouth 2 (two) times daily.     FI

## 2021-08-25 NOTE — OCCUPATIONAL THERAPY NOTE
OCCUPATIONAL THERAPY EVALUATION - INPATIENT     Room Number: 532/532-A  Evaluation Date: 8/25/2021  Type of Evaluation: Initial       Physician Order: IP Consult to Occupational Therapy  Reason for Therapy: ADL/IADL Dysfunction and Discharge Planning History:   Diagnosis Date   • [facility]    • BCC (basal cell carcinoma) 2019    right ear pinna   • Bilateral inguinal hernia    • Cancer ([facility] 2010, 2019, 2020    partial nephrectomy/skin cancers   • Cancer of kidney ([facility] 2005   • Cholecy on Room Air at Rest: 80    COGNITION  Overall Cognitive Status:  WFL - within functional limits    ACTIVITIES OF DAILY LIVING ASSESSMENT  AM-PAC ‘6-Clicks’ Inpatient Daily Activity Short Form  How much help from another person does the patient currently ne

## 2021-08-25 NOTE — PROGRESS NOTES
[facility]     Gastroenterology Progress Note    Erick Suarez Patient Status:  Inpatient    1926 MRN [id number]   Location [facility] 5SW/SE Attending Crystal Villa MD   Hosp Day # 2 PCP MD Demetrio Jones lumen, no biliary dilatation, unchanged finding in the pancreatic tail as well as mild adjacent inflammation and large amount of stool in the colon.      # acute pancreatitis: very mild inflammation seen on CT, although lipase elevated >3xULN on admission 07/25/2021     08/25/2021    MG 1.9 08/02/2021    PHOS 2.5 11/03/2020    TROP <0.045 08/23/2021    CK 44 10/27/2020    B12 687 05/20/2021       CT BRAIN OR HEAD (02922)    Result Date: 8/23/2021  CONCLUSION:  1. No acute intracranial process.  2. Neno Hoffman -------------------------- Atrial fibrillation -Intraventricular conduction defect and right axis consider ventricular hypertrophy.  -Diffuse ST depression + Extensive T-abnormality -Secondary to hypertrophy -possible Anterior/lateral and inferior ischemia

## 2021-08-25 NOTE — PROGRESS NOTES
[facility] Follow-up Visit    Myrna choledocholithiasis. In office today, pt presents for f/u. The patient is overall feeling very well. He has no acute abdominal pain. Denies nausea, vomiting, hematemesis, dyspepsia, dysphagia, odynophagia. No chest pain or shortness of breath. of aorta     per daughter      Past Surgical History:   Procedure Laterality Date   • BIOPSY OF SKIN LESION Left 03/05/2020    Forearm   • BIOPSY OF SKIN LESION Left 05/21/2020    Forearm x 2   • CARPAL TUNNEL RELEASE Right 2009   • COLONOSCOPY  2000   • E differently: 0.4 mg every evening.  ) 90 capsule 0   • ALLOPURINOL 300 MG Oral Tab Take 1 tablet by mouth once daily 90 tablet 0   • furosemide 20 MG Oral Tab Take 1 tablet (20 mg total) by mouth daily as needed.  Only takes if gained 3 lbs overnight or 5 l Neuro: Alert and oriented x4, and patient is having movements of all 4 extremities   Psych: Pt has a normal mood and affect, behavior is normal    Nursing note and vitals reviewed      Labs/Imaging:     Patient's labs and imaging were reviewed and discus were addressed. 60 minutes was spent in patient consultation, coordination of care, documentation. 2. Alcoholic liver cirrhosis: Known history of liver cirrhosis consistent with his most recent imaging studies.   The patient's daughter inquired whether

## 2021-08-25 NOTE — PLAN OF CARE
Vital signs stable, no complaints of pain overnight. Last night one time check of temperature 100.1, recheck was 99.9 and 98.6. Awaiting AM labs, continue to monitor.     Problem: Patient Centered Care  Goal: Patient preferences are identified and integrate pain  - Anticipate increased pain with activity and pre-medicate as appropriate  Outcome: Progressing     Problem: SAFETY ADULT - FALL  Goal: Free from fall injury  Description: INTERVENTIONS:  - Assess pt frequently for physical needs  - Identify cognitiv protection for wounds/incisions during mobilization  - Obtain PT/OT consults as needed  - Advance activity as appropriate  - Communicate ordered activity level and limitations with patient/family  Outcome: Progressing

## 2021-08-26 ENCOUNTER — TELEPHONE (OUTPATIENT)
Dept: INTERNAL MEDICINE CLINIC | Facility: CLINIC | Age: 86
End: 2021-08-26

## 2021-08-26 NOTE — TELEPHONE ENCOUNTER
pts daughter Axel Amaro who is on hippa [facility] and [facility] 11[facility]. Pts dario said pt was seen at the hospital by [facility]. please advise

## 2021-08-26 NOTE — TELEPHONE ENCOUNTER
[facility]50 [facility] at [facility] with Dr. Major Mora on Monday 30th,2021

## 2021-08-27 PROBLEM — R79.89 ELEVATED LFTS: Status: ACTIVE | Noted: 2020-10-31

## 2021-08-30 ENCOUNTER — OFFICE VISIT (OUTPATIENT)
Dept: INTERNAL MEDICINE CLINIC | Facility: CLINIC | Age: 86
End: 2021-08-30
Payer: MEDICARE

## 2021-08-30 VITALS
SYSTOLIC BLOOD PRESSURE: 138 MMHG | BODY MASS INDEX: 23.66 KG/M2 | HEART RATE: 66 BPM | OXYGEN SATURATION: 99 % | RESPIRATION RATE: 14 BRPM | HEIGHT: 71 IN | DIASTOLIC BLOOD PRESSURE: 60 MMHG | WEIGHT: 169 LBS

## 2021-08-30 DIAGNOSIS — K85.90 ACUTE PANCREATITIS, UNSPECIFIED COMPLICATION STATUS, UNSPECIFIED PANCREATITIS TYPE: Primary | ICD-10-CM

## 2021-08-30 PROCEDURE — 1111F DSCHRG MED/CURRENT MED MERGE: CPT | Performed by: INTERNAL MEDICINE

## 2021-08-30 PROCEDURE — 99495 TRANSJ CARE MGMT MOD F2F 14D: CPT | Performed by: INTERNAL MEDICINE

## 2021-08-30 NOTE — PROGRESS NOTES
HPI:    Deng Clifton is a 80year old male here today for hospital follow up.    He was discharged from Inpatient hospital, [facility] AND [facility]  to Home   Admission Date: 8/23/21   Discharge Date: 8/25/21  Hospital Discharge Diagnoses (since 7/31/2021)     a  Gastroenterology was consulted and was admitted to the medical floor. Acute pancreatitis, unspecified complication status, unspecified pancreatitis type  -post discharge, he is doing well. He has been eating okay.   No more recurrence of abdominal pain Capsule Delayed Release, Take 1 capsule by mouth once daily in the morning    No current facility-administered medications on file prior to visit.         HISTORY: reconciled and review with patient  He  has a past medical history of Atrial fibrillation (HC pain.   Gastrointestinal: Negative for abdominal distention, abdominal pain and vomiting. Genitourinary: Negative for hematuria. Skin: Negative for wound. Psychiatric/Behavioral: Negative for behavioral problems.        PHYSICAL EXAM:   No LMP for mal monitor    Hypertension-controlled. I have rechecked the blood pressure. Head trauma-no more falls reported        No orders of the defined types were placed in this encounter.       Meds & Refills for this Visit:  Requested Prescriptions      No prescr

## 2021-09-07 ENCOUNTER — PATIENT MESSAGE (OUTPATIENT)
Dept: INTERNAL MEDICINE CLINIC | Facility: CLINIC | Age: 86
End: 2021-09-07

## 2021-09-07 NOTE — TELEPHONE ENCOUNTER
From: Romy Coleman  To: Tommy Rudd MD  Sent: 9/7/2021 1:11 PM CDT  Subject: Non-Urgent Medical Question    Pretty Bernard: When signing into [email or web address] today I see that \"eligible\" people can schedule their 3rd Covid-19 shot - both Sushil Holguin and ABIMBOLA Smith

## 2021-09-08 ENCOUNTER — OFFICE VISIT (OUTPATIENT)
Dept: GASTROENTEROLOGY | Facility: CLINIC | Age: 86
End: 2021-09-08
Payer: MEDICARE

## 2021-09-08 VITALS
BODY MASS INDEX: 24 KG/M2 | SYSTOLIC BLOOD PRESSURE: 159 MMHG | HEIGHT: 71 IN | HEART RATE: 53 BPM | DIASTOLIC BLOOD PRESSURE: 67 MMHG

## 2021-09-08 DIAGNOSIS — Z87.19 HISTORY OF PANCREATITIS: Primary | ICD-10-CM

## 2021-09-08 DIAGNOSIS — K70.30 ALCOHOLIC CIRRHOSIS OF LIVER WITHOUT ASCITES (HCC): ICD-10-CM

## 2021-09-08 DIAGNOSIS — K86.9 PANCREATIC LESION: ICD-10-CM

## 2021-09-08 DIAGNOSIS — K59.00 CONSTIPATION, UNSPECIFIED CONSTIPATION TYPE: ICD-10-CM

## 2021-09-08 PROCEDURE — 99215 OFFICE O/P EST HI 40 MIN: CPT | Performed by: NURSE PRACTITIONER

## 2021-09-08 RX ORDER — POTASSIUM CHLORIDE 20 MEQ/1
TABLET, EXTENDED RELEASE ORAL
COMMUNITY
Start: 2021-07-29 | End: 2021-09-17 | Stop reason: ALTCHOICE

## 2021-09-08 NOTE — PATIENT INSTRUCTIONS
-Continue MiraLAX every other day  -Complete MRI/MRCP pending patient decision  -Follow-up with any new signs or symptoms

## 2021-09-15 ENCOUNTER — TELEPHONE (OUTPATIENT)
Dept: INTERNAL MEDICINE CLINIC | Facility: CLINIC | Age: 86
End: 2021-09-15

## 2021-09-15 NOTE — TELEPHONE ENCOUNTER
Pt's daughter, Ct Nephew (on MICKEY), calling on behalf of Pt. Reports, last Friday, Pt was complaining of bilateral groin pain, rested and pain had passed. Daughter also noticed increased confusion and low grade fever 99F.   Urine looked darker, Pt uses a bed

## 2021-09-16 ENCOUNTER — PATIENT MESSAGE (OUTPATIENT)
Dept: INTERNAL MEDICINE CLINIC | Facility: CLINIC | Age: 86
End: 2021-09-16

## 2021-09-17 ENCOUNTER — PATIENT MESSAGE (OUTPATIENT)
Dept: INTERNAL MEDICINE CLINIC | Facility: CLINIC | Age: 86
End: 2021-09-17

## 2021-09-17 ENCOUNTER — APPOINTMENT (OUTPATIENT)
Dept: CT IMAGING | Facility: HOSPITAL | Age: 86
End: 2021-09-17
Attending: EMERGENCY MEDICINE
Payer: MEDICARE

## 2021-09-17 ENCOUNTER — HOSPITAL ENCOUNTER (EMERGENCY)
Facility: HOSPITAL | Age: 86
Discharge: HOME | End: 2021-09-17
Attending: EMERGENCY MEDICINE
Payer: MEDICARE

## 2021-09-17 ENCOUNTER — HOSPITAL ENCOUNTER (OUTPATIENT)
Age: 86
Discharge: STILL ADMITTED OR ADMINISTRATIVE | End: 2021-09-17
Payer: MEDICARE

## 2021-09-17 VITALS
OXYGEN SATURATION: 99 % | HEART RATE: 58 BPM | RESPIRATION RATE: 20 BRPM | DIASTOLIC BLOOD PRESSURE: 61 MMHG | TEMPERATURE: 98 F | SYSTOLIC BLOOD PRESSURE: 144 MMHG | BODY MASS INDEX: 23 KG/M2 | WEIGHT: 164 LBS

## 2021-09-17 DIAGNOSIS — N30.90 CYSTITIS: Primary | ICD-10-CM

## 2021-09-17 LAB
ALBUMIN SERPL-MCNC: 2.7 G/DL (ref 3.4–5)
ALP LIVER SERPL-CCNC: 247 U/L
ALT SERPL-CCNC: 32 U/L
ANION GAP SERPL CALC-SCNC: 7 MMOL/L (ref 0–18)
AST SERPL-CCNC: 46 U/L (ref 15–37)
BASOPHILS # BLD AUTO: 0.05 X10(3) UL (ref 0–0.2)
BASOPHILS NFR BLD AUTO: 0.4 %
BILIRUB DIRECT SERPL-MCNC: 0.6 MG/DL (ref 0–0.2)
BILIRUB SERPL-MCNC: 1.2 MG/DL (ref 0.1–2)
BILIRUB UR QL: NEGATIVE
BUN BLD-MCNC: 17 MG/DL (ref 7–18)
BUN/CREAT SERPL: 15.9 (ref 10–20)
CALCIUM BLD-MCNC: 8.9 MG/DL (ref 8.5–10.1)
CHLORIDE SERPL-SCNC: 99 MMOL/L (ref 98–112)
CO2 SERPL-SCNC: 26 MMOL/L (ref 21–32)
COLOR UR: YELLOW
CREAT BLD-MCNC: 1.07 MG/DL
DEPRECATED RDW RBC AUTO: 50.1 FL (ref 35.1–46.3)
EOSINOPHIL # BLD AUTO: 0.35 X10(3) UL (ref 0–0.7)
EOSINOPHIL NFR BLD AUTO: 3.1 %
ERYTHROCYTE [DISTWIDTH] IN BLOOD BY AUTOMATED COUNT: 13.8 % (ref 11–15)
GLUCOSE BLD-MCNC: 88 MG/DL (ref 70–99)
GLUCOSE UR-MCNC: NEGATIVE MG/DL
HCT VFR BLD AUTO: 39.7 %
HGB BLD-MCNC: 13.3 G/DL
HGB UR QL STRIP.AUTO: NEGATIVE
IMM GRANULOCYTES # BLD AUTO: 0.07 X10(3) UL (ref 0–1)
IMM GRANULOCYTES NFR BLD: 0.6 %
KETONES UR-MCNC: NEGATIVE MG/DL
LEUKOCYTE ESTERASE UR QL STRIP.AUTO: NEGATIVE
LIPASE SERPL-CCNC: 78 U/L (ref 73–393)
LYMPHOCYTES # BLD AUTO: 1.63 X10(3) UL (ref 1–4)
LYMPHOCYTES NFR BLD AUTO: 14.3 %
MCH RBC QN AUTO: 32.9 PG (ref 26–34)
MCHC RBC AUTO-ENTMCNC: 33.5 G/DL (ref 31–37)
MCV RBC AUTO: 98.3 FL
MONOCYTES # BLD AUTO: 1.49 X10(3) UL (ref 0.1–1)
MONOCYTES NFR BLD AUTO: 13.1 %
NEUTROPHILS # BLD AUTO: 7.8 X10 (3) UL (ref 1.5–7.7)
NEUTROPHILS # BLD AUTO: 7.8 X10(3) UL (ref 1.5–7.7)
NEUTROPHILS NFR BLD AUTO: 68.5 %
NITRITE UR QL STRIP.AUTO: NEGATIVE
OSMOLALITY SERPL CALC.SUM OF ELEC: 275 MOSM/KG (ref 275–295)
PH UR: 7 [PH] (ref 5–8)
PLATELET # BLD AUTO: 192 10(3)UL (ref 150–450)
POTASSIUM SERPL-SCNC: 4.7 MMOL/L (ref 3.5–5.1)
PROT SERPL-MCNC: 8.4 G/DL (ref 6.4–8.2)
PROT UR-MCNC: 30 MG/DL
RBC # BLD AUTO: 4.04 X10(6)UL
SODIUM SERPL-SCNC: 132 MMOL/L (ref 136–145)
SP GR UR STRIP: 1.01 (ref 1–1.03)
UROBILINOGEN UR STRIP-ACNC: 4
WBC # BLD AUTO: 11.4 X10(3) UL (ref 4–11)

## 2021-09-17 PROCEDURE — 83690 ASSAY OF LIPASE: CPT | Performed by: EMERGENCY MEDICINE

## 2021-09-17 PROCEDURE — 85025 COMPLETE CBC W/AUTO DIFF WBC: CPT | Performed by: EMERGENCY MEDICINE

## 2021-09-17 PROCEDURE — 80076 HEPATIC FUNCTION PANEL: CPT | Performed by: EMERGENCY MEDICINE

## 2021-09-17 PROCEDURE — 87086 URINE CULTURE/COLONY COUNT: CPT

## 2021-09-17 PROCEDURE — 81001 URINALYSIS AUTO W/SCOPE: CPT

## 2021-09-17 PROCEDURE — 87086 URINE CULTURE/COLONY COUNT: CPT | Performed by: EMERGENCY MEDICINE

## 2021-09-17 PROCEDURE — 80048 BASIC METABOLIC PNL TOTAL CA: CPT | Performed by: EMERGENCY MEDICINE

## 2021-09-17 PROCEDURE — 74177 CT ABD & PELVIS W/CONTRAST: CPT | Performed by: EMERGENCY MEDICINE

## 2021-09-17 PROCEDURE — 81001 URINALYSIS AUTO W/SCOPE: CPT | Performed by: EMERGENCY MEDICINE

## 2021-09-17 PROCEDURE — 99284 EMERGENCY DEPT VISIT MOD MDM: CPT

## 2021-09-17 PROCEDURE — 96365 THER/PROPH/DIAG IV INF INIT: CPT

## 2021-09-17 RX ORDER — CEPHALEXIN 500 MG/1
500 CAPSULE ORAL 3 TIMES DAILY
Qty: 21 CAPSULE | Refills: 0 | Status: SHIPPED | OUTPATIENT
Start: 2021-09-17 | End: 2021-09-24

## 2021-09-17 NOTE — ED INITIAL ASSESSMENT (HPI)
Patient complains of R flank pain since labor day, hx of pancreatitis, states he has had reduced urine flow for about a week

## 2021-09-17 NOTE — TELEPHONE ENCOUNTER
From: Kyra Pemberton  Sent: 9/17/2021 2:30 PM CDT  To: Em Rn Triage  Subject: Dad's symptoms    Miguel Espino. Luckily the immediate care doc reviewed dads reason for coming and called to stop us from going there.  They would not be able to do the type of blood wor

## 2021-09-17 NOTE — ED PROVIDER NOTES
Patient Seen in: [facility] AND [facility] Emergency Department      History   Patient presents with:  Urinary Symptoms    Stated Complaint: urinary symptoms    Subjective:   HPI    Patient is a 25-year-old male brought to the ER by his daughter.   He reportedly LAMINECTOMY  2000   • LAPAROSCOPY, SURGICAL; PARTIAL NEPHRECTOMY Right    • REPR CMPL WND HEAD,FAC,HAND 2.6-7.5 Right 02/12/2020   • REPR CMPL WND LID,NOS,EAR 2.5-7.5 Right 02/12/2020                Social History    Tobacco Use      Smoking status: Former tenderness. Lymphadenopathy: No cervical adenopathy. Neurological: Alert and oriented to person, place, and time. Normal reflexes. No cranial nerve deficit. No motor os sensory defecits noted Coordination normal.   Skin: Skin is warm and dry.    Psychia RAINBOW DRAW GOLD   URINE CULTURE, ROUTINE          Urinalysis and CT findings discussed with patient and his daughter. They agree with follow-up plan. We will treat with antibiotics and discharge home. They are in agreement with this plan as well. Impression:  Cystitis  (primary encounter diagnosis)     Disposition:  Discharge  9/17/2021  5:58 pm    Follow-up:  Alina Hyde MD  [address] [address]  [phone]    Schedule an appointment as soon as possible for a visit in

## 2021-09-17 NOTE — TELEPHONE ENCOUNTER
Discussed with daughter   Pt is in ED now     [facility]

## 2021-09-21 ENCOUNTER — OFFICE VISIT (OUTPATIENT)
Dept: INTERNAL MEDICINE CLINIC | Facility: CLINIC | Age: 86
End: 2021-09-21
Payer: MEDICARE

## 2021-09-21 VITALS
RESPIRATION RATE: 14 BRPM | HEART RATE: 66 BPM | BODY MASS INDEX: 23.1 KG/M2 | WEIGHT: 165 LBS | SYSTOLIC BLOOD PRESSURE: 134 MMHG | DIASTOLIC BLOOD PRESSURE: 72 MMHG | HEIGHT: 71 IN | OXYGEN SATURATION: 100 %

## 2021-09-21 DIAGNOSIS — E53.8 VITAMIN B12 DEFICIENCY: ICD-10-CM

## 2021-09-21 DIAGNOSIS — F01.50 VASCULAR DEMENTIA WITHOUT BEHAVIORAL DISTURBANCE (HCC): ICD-10-CM

## 2021-09-21 DIAGNOSIS — K74.60 CIRRHOSIS OF LIVER WITHOUT ASCITES, UNSPECIFIED HEPATIC CIRRHOSIS TYPE (HCC): ICD-10-CM

## 2021-09-21 DIAGNOSIS — E04.1 NONTOXIC UNINODULAR GOITER: ICD-10-CM

## 2021-09-21 DIAGNOSIS — I48.20 CHRONIC ATRIAL FIBRILLATION (HCC): ICD-10-CM

## 2021-09-21 DIAGNOSIS — M11.20 CHONDROCALCINOSIS ARTICULARIS: ICD-10-CM

## 2021-09-21 DIAGNOSIS — I35.0 NONRHEUMATIC AORTIC VALVE STENOSIS: ICD-10-CM

## 2021-09-21 DIAGNOSIS — I10 ESSENTIAL HYPERTENSION WITH GOAL BLOOD PRESSURE LESS THAN 140/90: ICD-10-CM

## 2021-09-21 DIAGNOSIS — E78.00 HYPERCHOLESTEROLEMIA: ICD-10-CM

## 2021-09-21 DIAGNOSIS — E87.6 HYPOKALEMIA: ICD-10-CM

## 2021-09-21 DIAGNOSIS — I25.119 ATHEROSCLEROSIS OF NATIVE CORONARY ARTERY OF NATIVE HEART WITH ANGINA PECTORIS (HCC): ICD-10-CM

## 2021-09-21 DIAGNOSIS — K25.3 ACUTE GASTRIC ULCER WITHOUT HEMORRHAGE OR PERFORATION: Primary | ICD-10-CM

## 2021-09-21 DIAGNOSIS — K70.30 ALCOHOLIC CIRRHOSIS OF LIVER WITHOUT ASCITES (HCC): ICD-10-CM

## 2021-09-21 DIAGNOSIS — Z23 NEED FOR INFLUENZA VACCINATION: ICD-10-CM

## 2021-09-21 DIAGNOSIS — Z85.828 PERSONAL HISTORY OF SKIN CANCER: ICD-10-CM

## 2021-09-21 DIAGNOSIS — I48.19 PERSISTENT ATRIAL FIBRILLATION (HCC): ICD-10-CM

## 2021-09-21 DIAGNOSIS — K85.90 ACUTE ON CHRONIC PANCREATITIS (HCC): ICD-10-CM

## 2021-09-21 DIAGNOSIS — K86.1 ACUTE ON CHRONIC PANCREATITIS (HCC): ICD-10-CM

## 2021-09-21 DIAGNOSIS — J69.0 ASPIRATION PNEUMONIA, UNSPECIFIED ASPIRATION PNEUMONIA TYPE, UNSPECIFIED LATERALITY, UNSPECIFIED PART OF LUNG (HCC): ICD-10-CM

## 2021-09-21 PROBLEM — L98.9 SKIN LESION OF RIGHT ARM: Status: RESOLVED | Noted: 2020-02-18 | Resolved: 2021-09-21

## 2021-09-21 PROCEDURE — G0439 PPPS, SUBSEQ VISIT: HCPCS | Performed by: INTERNAL MEDICINE

## 2021-09-21 PROCEDURE — 90662 IIV NO PRSV INCREASED AG IM: CPT | Performed by: INTERNAL MEDICINE

## 2021-09-21 PROCEDURE — 1111F DSCHRG MED/CURRENT MED MERGE: CPT | Performed by: INTERNAL MEDICINE

## 2021-09-21 PROCEDURE — G0008 ADMIN INFLUENZA VIRUS VAC: HCPCS | Performed by: INTERNAL MEDICINE

## 2021-09-21 RX ORDER — TAMSULOSIN HYDROCHLORIDE 0.4 MG/1
0.4 CAPSULE ORAL DAILY
Qty: 90 CAPSULE | Refills: 0 | Status: SHIPPED | OUTPATIENT
Start: 2021-09-21 | End: 2021-12-28

## 2021-09-21 RX ORDER — FUROSEMIDE 20 MG/1
20 TABLET ORAL DAILY PRN
Qty: 90 TABLET | Refills: 1 | Status: SHIPPED | OUTPATIENT
Start: 2021-09-21

## 2021-09-21 RX ORDER — POTASSIUM CHLORIDE 1500 MG/1
20 TABLET, FILM COATED, EXTENDED RELEASE ORAL EVERY MORNING
Qty: 30 TABLET | Refills: 2 | Status: SHIPPED | OUTPATIENT
Start: 2021-09-21

## 2021-09-21 RX ORDER — LOVASTATIN 40 MG/1
40 TABLET ORAL NIGHTLY
Qty: 90 TABLET | Refills: 1 | Status: SHIPPED | OUTPATIENT
Start: 2021-09-21

## 2021-09-21 RX ORDER — PANTOPRAZOLE SODIUM 40 MG/1
40 TABLET, DELAYED RELEASE ORAL
COMMUNITY
End: 2021-10-12

## 2021-09-21 RX ORDER — ALLOPURINOL 300 MG/1
300 TABLET ORAL DAILY
Qty: 90 TABLET | Refills: 1 | Status: SHIPPED | OUTPATIENT
Start: 2021-09-21

## 2021-09-21 NOTE — PROGRESS NOTES
HPI:   Jovita Cooney is a 80year old male who presents for a Medicare Subsequent Annual Wellness visit (Pt already had Initial Annual Wellness). 42-year-old gentleman here for Medicare annual wellness visit. He is accompanied by his daughter.   His daugh Managing money/bills: Cannot do without help  He has difficulties Shopping for Groceries based on screening of functional status. Shop for groceries: Cannot do without help   He has Hearing problems based on screening of functional status.    Hearing Pr Team:  Susana Syed MD as PCP - General (Internal Medicine)  Fiona Mcginnis NP as Nurse Practitioner (Internal Medicine)  Juhi Ross MD (Interventional, Cardiology)    Patient Active Problem List:     Nontoxic uninodular goiter     Aris Fuse (most recent labs)   Lab Results   Component Value Date    WBC 11.4 (H) 09/17/2021    HGB 13.3 09/17/2021    .0 09/17/2021        ALLERGIES:   He has No Known Allergies.     CURRENT MEDICATIONS:   pantoprazole 40 MG Oral Tab EC, Take 40 mg by mouth (2/18/2020), and Stenosis of aorta.     He  has a past surgical history that includes carpal tunnel release (Right, 2009); hip replacement surgery (Left, 01/05/2000); laminectomy (2000); exc skin malig 2.1-3cm face,facial (Right, 02/12/2020); repr cmpl wnd 146/62 (BP Location: Right arm, Patient Position: Sitting, Cuff Size: adult)   Pulse 66   Resp 14   Ht 5' 11\" (1.803 m)   Wt 165 lb (74.8 kg)   SpO2 100%   BMI 23.01 kg/m²   Estimated body mass index is 23.01 kg/m² as calculated from the following:    [place] Normal appearance. HENT:      Head: Normocephalic. Right Ear: Tympanic membrane normal.      Left Ear: Tympanic membrane normal.      Nose: Nose normal.      Mouth/Throat:      Mouth: Mucous membranes are moist.      Pharynx: Oropharynx is clear. • Pneumococcal (Prevnar 13) 02/01/2017   • Pneumovax 23 03/05/2018   • QUAD INTRADERMAL 18-64 YRS FLU SINGLE DOSE ([id number] 09/10/2020   • Zoster Vaccine Live (Zostavax) 11/15/2013        ASSESSMENT AND OTHER RELEVANT CHRONIC CONDITIONS:   Malachy Cagey is mouth nightly. -     furosemide 20 MG Oral Tab; Take 1 tablet (20 mg total) by mouth daily as needed. Only takes if gained 3 lbs overnight or 5 lbs in 1 week       Patient is up-to-date on Covid vaccine series.   He also received PCV 13, PPSV23 and Deny Martini 08/25/2021         Electrocardiogram (EKG)   Covered if needed at Welcome to Medicare, and non-screening if indicated for medical reasons 08/23/2021      Ultrasound Screening for Abdominal Aortic Aneurysm (AAA) Covered once in a lifetime for one of the fol 1.07 09/17/2021         BUN Annually Lab Results   Component Value Date    BUN 17 09/17/2021       Drug Serum Conc Annually No results found for: DIGOXIN, DIG, VALP

## 2021-10-08 ENCOUNTER — TELEPHONE (OUTPATIENT)
Dept: INTERNAL MEDICINE CLINIC | Facility: CLINIC | Age: 86
End: 2021-10-08

## 2021-10-08 DIAGNOSIS — R50.9 FEVER, UNSPECIFIED FEVER CAUSE: Primary | ICD-10-CM

## 2021-10-08 NOTE — TELEPHONE ENCOUNTER
I have ordered for a CBC and a urine test.  Please let them know thank you  Unfortunately, I cannot order blood culture like that.   Please let them know

## 2021-10-08 NOTE — TELEPHONE ENCOUNTER
----- Message from Evan Bashir Jose sent at 10/7/2021  7:53 PM CDT -----  Regarding: Dad's Symptoms  : on 9/17 dad was at ER to investigate his symptoms-diagnosis UTI, script of Ceflex(sp) for 7 days. We saw you for the new patient appt.  on 9/21 an

## 2021-10-12 NOTE — TELEPHONE ENCOUNTER
Refill passed per [facility], [facility] protocol, but shows --not on current medication list    Please refill if appropriate    21 1206 Resume at Discharge Fernando Manzanares MD    21 [address] [facility] [place], [address]    21 [address], [address] [facility].  12 [facility], [address]    In 1 month Rodney Brennan MD [facility], [facility], [facility] 2 mo f/u

## 2021-10-13 RX ORDER — PANTOPRAZOLE SODIUM 40 MG/1
40 TABLET, DELAYED RELEASE ORAL
Qty: 90 TABLET | Refills: 1 | Status: SHIPPED | OUTPATIENT
Start: 2021-10-13 | End: 2021-11-15

## 2021-10-13 RX ORDER — PANTOPRAZOLE SODIUM 40 MG/1
40 TABLET, DELAYED RELEASE ORAL
Qty: 180 TABLET | Refills: 1 | Status: SHIPPED | OUTPATIENT
Start: 2021-10-13 | End: 2021-10-13

## 2021-10-13 NOTE — TELEPHONE ENCOUNTER
Per the daughter Franchesca Palmer who is on HIPAA and per Franchesca Palmer he only takes once daily. The daughter stated she has discuss with Dr. Dante Jane who stated once a day is fine. The Omeprazole per the daughter makes him burping.     She stated will stay on the [facility]

## 2021-10-13 NOTE — TELEPHONE ENCOUNTER
I have approved pantoprazole twice daily. Please inform daughter that he should be taking either pantoprazole or omeprazole.   They need to follow-up with gastroenterology to decide whether patient should continue with twice daily or can go back to once a

## 2021-10-14 ENCOUNTER — OFFICE VISIT (OUTPATIENT)
Dept: PODIATRY CLINIC | Facility: CLINIC | Age: 86
End: 2021-10-14
Payer: MEDICARE

## 2021-10-14 VITALS — WEIGHT: 165 LBS | BODY MASS INDEX: 23.1 KG/M2 | HEIGHT: 71 IN

## 2021-10-14 DIAGNOSIS — B35.1 ONYCHOMYCOSIS: ICD-10-CM

## 2021-10-14 DIAGNOSIS — M79.674 PAIN IN TOES OF BOTH FEET: Primary | ICD-10-CM

## 2021-10-14 DIAGNOSIS — M79.675 PAIN IN TOES OF BOTH FEET: Primary | ICD-10-CM

## 2021-10-14 PROCEDURE — 11721 DEBRIDE NAIL 6 OR MORE: CPT | Performed by: PODIATRIST

## 2021-10-14 NOTE — PROGRESS NOTES
HPI:    Patient ID: Angelo Caban is a 80year old male. This 43-year-old male presents with recurrent pain associated with his toenails. The last time I saw this patient was June 17. He reports relief by previous care.     ROS:   There are no apparent were noted upon debridement.   I anticipate relief will see patient for care if and when symptoms redevelop       ASSESSMENT/PLAN:   Pain in toes of both feet  (primary encounter diagnosis)  Onychomycosis    No orders of the defined types were placed in [facility]

## 2021-11-02 ENCOUNTER — APPOINTMENT (OUTPATIENT)
Dept: URBAN - METROPOLITAN AREA CLINIC 321 | Age: 86
Setting detail: DERMATOLOGY
End: 2021-11-03

## 2021-11-02 DIAGNOSIS — Z85.828 PERSONAL HISTORY OF OTHER MALIGNANT NEOPLASM OF SKIN: ICD-10-CM

## 2021-11-02 DIAGNOSIS — B07.8 OTHER VIRAL WARTS: ICD-10-CM

## 2021-11-02 DIAGNOSIS — L57.0 ACTINIC KERATOSIS: ICD-10-CM

## 2021-11-02 DIAGNOSIS — L82.1 OTHER SEBORRHEIC KERATOSIS: ICD-10-CM

## 2021-11-02 DIAGNOSIS — L81.4 OTHER MELANIN HYPERPIGMENTATION: ICD-10-CM

## 2021-11-02 DIAGNOSIS — L82.0 INFLAMED SEBORRHEIC KERATOSIS: ICD-10-CM

## 2021-11-02 DIAGNOSIS — D22 MELANOCYTIC NEVI: ICD-10-CM

## 2021-11-02 PROBLEM — D22.61 MELANOCYTIC NEVI OF RIGHT UPPER LIMB, INCLUDING SHOULDER: Status: ACTIVE | Noted: 2021-11-02

## 2021-11-02 PROBLEM — D22.62 MELANOCYTIC NEVI OF LEFT UPPER LIMB, INCLUDING SHOULDER: Status: ACTIVE | Noted: 2021-11-02

## 2021-11-02 PROBLEM — D22.5 MELANOCYTIC NEVI OF TRUNK: Status: ACTIVE | Noted: 2021-11-02

## 2021-11-02 PROCEDURE — OTHER COUNSELING: OTHER

## 2021-11-02 PROCEDURE — 17003 DESTRUCT PREMALG LES 2-14: CPT | Mod: 59

## 2021-11-02 PROCEDURE — OTHER LIQUID NITROGEN: OTHER

## 2021-11-02 PROCEDURE — 99213 OFFICE O/P EST LOW 20 MIN: CPT | Mod: 25

## 2021-11-02 PROCEDURE — 17110 DESTRUCT B9 LESION 1-14: CPT

## 2021-11-02 PROCEDURE — 17000 DESTRUCT PREMALG LESION: CPT | Mod: 59

## 2021-11-02 ASSESSMENT — LOCATION ZONE DERM
LOCATION ZONE: EAR
LOCATION ZONE: FACE
LOCATION ZONE: LIP
LOCATION ZONE: TRUNK
LOCATION ZONE: LEG
LOCATION ZONE: ARM

## 2021-11-02 ASSESSMENT — LOCATION DETAILED DESCRIPTION DERM
LOCATION DETAILED: LEFT DISTAL POSTERIOR UPPER ARM
LOCATION DETAILED: RIGHT ANTERIOR DISTAL UPPER ARM
LOCATION DETAILED: RIGHT MEDIAL SUPERIOR CHEST
LOCATION DETAILED: STERNUM
LOCATION DETAILED: LEFT ANTERIOR DISTAL UPPER ARM
LOCATION DETAILED: LEFT VENTRAL PROXIMAL FOREARM
LOCATION DETAILED: UPPER STERNUM
LOCATION DETAILED: LEFT ANTECUBITAL SKIN
LOCATION DETAILED: LEFT DISTAL PRETIBIAL REGION
LOCATION DETAILED: RIGHT MEDIAL INFERIOR CHEST
LOCATION DETAILED: RIGHT UPPER CUTANEOUS LIP
LOCATION DETAILED: RIGHT CYMBA CONCHA
LOCATION DETAILED: MIDDLE STERNUM
LOCATION DETAILED: RIGHT MEDIAL TEMPLE
LOCATION DETAILED: RIGHT ANTECUBITAL SKIN

## 2021-11-02 ASSESSMENT — LOCATION SIMPLE DESCRIPTION DERM
LOCATION SIMPLE: RIGHT TEMPLE
LOCATION SIMPLE: LEFT UPPER ARM
LOCATION SIMPLE: RIGHT LIP
LOCATION SIMPLE: RIGHT EAR
LOCATION SIMPLE: CHEST
LOCATION SIMPLE: LEFT PRETIBIAL REGION
LOCATION SIMPLE: LEFT FOREARM
LOCATION SIMPLE: RIGHT UPPER ARM

## 2021-11-02 NOTE — PROCEDURE: LIQUID NITROGEN
Duration Of Freeze Thaw-Cycle (Seconds): 5-10
Add 52 Modifier (Optional): no
Consent: The patient's consent was obtained including but not limited to risks of crusting, scabbing, blistering, scarring, darker or lighter pigmentary change, recurrence, incomplete removal and infection.
Post-Care Instructions: I reviewed with the patient in detail post-care instructions. Patient is to wear sunprotection, and avoid picking at any of the treated lesions. Pt may apply Vaseline to crusted or scabbing areas.
Show Aperture Variable?: Yes
Duration Of Freeze Thaw-Cycle (Seconds): 0
Medical Necessity Clause: This procedure was medically necessary because the lesions that were treated were:
Detail Level: Detailed
Medical Necessity Information: It is in your best interest to select a reason for this procedure from the list below. All of these items fulfill various CMS LCD requirements except the new and changing color options.
Number Of Freeze-Thaw Cycles: 1 freeze-thaw cycle

## 2021-11-15 ENCOUNTER — LAB ENCOUNTER (OUTPATIENT)
Dept: LAB | Age: 86
End: 2021-11-15
Attending: INTERNAL MEDICINE
Payer: MEDICARE

## 2021-11-15 ENCOUNTER — TELEPHONE (OUTPATIENT)
Dept: INTERNAL MEDICINE CLINIC | Facility: CLINIC | Age: 86
End: 2021-11-15

## 2021-11-15 ENCOUNTER — OFFICE VISIT (OUTPATIENT)
Dept: INTERNAL MEDICINE CLINIC | Facility: CLINIC | Age: 86
End: 2021-11-15
Payer: MEDICARE

## 2021-11-15 VITALS
HEART RATE: 62 BPM | WEIGHT: 167 LBS | SYSTOLIC BLOOD PRESSURE: 136 MMHG | DIASTOLIC BLOOD PRESSURE: 69 MMHG | BODY MASS INDEX: 23.38 KG/M2 | OXYGEN SATURATION: 98 % | RESPIRATION RATE: 14 BRPM | HEIGHT: 71 IN

## 2021-11-15 DIAGNOSIS — K25.3 ACUTE GASTRIC ULCER WITHOUT HEMORRHAGE OR PERFORATION: ICD-10-CM

## 2021-11-15 DIAGNOSIS — I48.19 PERSISTENT ATRIAL FIBRILLATION (HCC): ICD-10-CM

## 2021-11-15 DIAGNOSIS — I10 ESSENTIAL HYPERTENSION WITH GOAL BLOOD PRESSURE LESS THAN 140/90: Primary | ICD-10-CM

## 2021-11-15 DIAGNOSIS — R50.9 FEVER, UNSPECIFIED FEVER CAUSE: ICD-10-CM

## 2021-11-15 DIAGNOSIS — I10 ESSENTIAL HYPERTENSION WITH GOAL BLOOD PRESSURE LESS THAN 140/90: ICD-10-CM

## 2021-11-15 DIAGNOSIS — E78.00 HYPERCHOLESTEROLEMIA: ICD-10-CM

## 2021-11-15 PROBLEM — N40.0 BPH (BENIGN PROSTATIC HYPERPLASIA): Status: ACTIVE | Noted: 2021-11-15

## 2021-11-15 PROCEDURE — 99214 OFFICE O/P EST MOD 30 MIN: CPT | Performed by: INTERNAL MEDICINE

## 2021-11-15 PROCEDURE — 85027 COMPLETE CBC AUTOMATED: CPT

## 2021-11-15 PROCEDURE — 84132 ASSAY OF SERUM POTASSIUM: CPT

## 2021-11-15 PROCEDURE — 36415 COLL VENOUS BLD VENIPUNCTURE: CPT

## 2021-11-15 RX ORDER — OMEPRAZOLE 20 MG/1
20 CAPSULE, DELAYED RELEASE ORAL
COMMUNITY
End: 2021-11-15

## 2021-11-15 RX ORDER — OMEPRAZOLE 20 MG/1
20 CAPSULE, DELAYED RELEASE ORAL
Qty: 90 CAPSULE | Refills: 1 | Status: SHIPPED | OUTPATIENT
Start: 2021-11-15

## 2021-11-15 NOTE — TELEPHONE ENCOUNTER
Left a detailed message to his daughter Chata's cell (ok per MICKEY) regarding note below. Advised to call back and provided # / hours.

## 2021-11-15 NOTE — TELEPHONE ENCOUNTER
The patient or proxy has not reviewed this information, and there are updates pending:   Requested Medication Removals Start Date Reported By  Comments   pantoprazole 40 MG Tbec 10/13/2021 Ayana Medrano this seemed to be causing dizziness

## 2021-11-15 NOTE — PROGRESS NOTES
Romy Coleman is a 80year old male. Patient presents with: Follow - Up: 2 mos f/u and Potassium medication discussion     HPI:   80 gentleman here for follow-up. He is here with daughter. Patient reports that he is doing excellent.   He has no complaints nephrectomy/skin cancers   • Cancer of kidney ([facility] 2005   • Cholecystitis 2013   • Cirrhosis, cryptogenic ([facility] 2013   • Essential hypertension     controlled w/medication   • Heart disease     aortic stenosis;  Afib, coronary artery disease   • High blood hyperlipidemia and vascular disease      No Known Allergies     REVIEW OF SYSTEMS:   Review of Systems   Review of Systems   Constitutional: Negative for activity change, appetite change and fever. HENT: Negative for congestion and voice change.     Respi 1. Essential hypertension with goal blood pressure less than 140/90   -Patient daughter checks blood pressure and give amlodipine if the blood pressure is high. Otherwise she will continue to monitor.   Because of previous hypokalemia, I have ordered for

## 2021-11-16 ENCOUNTER — LAB ENCOUNTER (OUTPATIENT)
Dept: LAB | Age: 86
End: 2021-11-16
Attending: NURSE ANESTHETIST, CERTIFIED REGISTERED
Payer: MEDICARE

## 2021-11-16 DIAGNOSIS — K25.3 ACUTE GASTRIC ULCER WITHOUT HEMORRHAGE OR PERFORATION: ICD-10-CM

## 2021-11-16 DIAGNOSIS — I10 ESSENTIAL HYPERTENSION WITH GOAL BLOOD PRESSURE LESS THAN 140/90: ICD-10-CM

## 2021-11-16 DIAGNOSIS — R50.9 FEVER, UNSPECIFIED FEVER CAUSE: ICD-10-CM

## 2021-11-16 PROCEDURE — 81003 URINALYSIS AUTO W/O SCOPE: CPT

## 2021-11-23 ENCOUNTER — TELEPHONE (OUTPATIENT)
Dept: GASTROENTEROLOGY | Facility: CLINIC | Age: 86
End: 2021-11-23

## 2021-11-23 NOTE — TELEPHONE ENCOUNTER
1st reminder letter mailed to patient regarding his overdue imaging order:     MRI ABDOMEN (W+WO) (MLX=20643)   MRI MRCP (CPT=74181)

## 2021-12-28 RX ORDER — TAMSULOSIN HYDROCHLORIDE 0.4 MG/1
CAPSULE ORAL
Qty: 90 CAPSULE | Refills: 0 | Status: SHIPPED | OUTPATIENT
Start: 2021-12-28

## 2022-01-11 ENCOUNTER — NURSE TRIAGE (OUTPATIENT)
Dept: INTERNAL MEDICINE CLINIC | Facility: CLINIC | Age: 87
End: 2022-01-11

## 2022-01-11 DIAGNOSIS — R39.15 URINARY URGENCY: Primary | ICD-10-CM

## 2022-01-11 NOTE — TELEPHONE ENCOUNTER
Action Requested: Summary for Provider     []  Critical Lab, Recommendations Needed  [] Need Additional Advice  []   FYI    []   Need Orders  [] Need Medications Sent to Pharmacy  []  Other     SUMMARY: pt's daughter believes that the pt may possibly hav

## 2022-01-12 NOTE — TELEPHONE ENCOUNTER
RN Called patient's daughter and informed her of message below. Patient's date of birth and full name both confirmed. Daughter agrees to have patient complete these tests. Also advised ER if worsening symptoms.      Future Appointments   Date Time Provid

## 2022-01-13 ENCOUNTER — LAB ENCOUNTER (OUTPATIENT)
Dept: LAB | Age: 87
End: 2022-01-13
Attending: INTERNAL MEDICINE
Payer: MEDICARE

## 2022-01-13 LAB
BILIRUB UR QL: NEGATIVE
COLOR UR: YELLOW
GLUCOSE UR-MCNC: NEGATIVE MG/DL
HGB UR QL STRIP.AUTO: NEGATIVE
KETONES UR-MCNC: NEGATIVE MG/DL
LEUKOCYTE ESTERASE UR QL STRIP.AUTO: NEGATIVE
NITRITE UR QL STRIP.AUTO: NEGATIVE
PH UR: 7 [PH] (ref 5–8)
PROT UR-MCNC: NEGATIVE MG/DL
SP GR UR STRIP: 1.01 (ref 1–1.03)
UROBILINOGEN UR STRIP-ACNC: 4

## 2022-01-13 PROCEDURE — 81003 URINALYSIS AUTO W/O SCOPE: CPT | Performed by: INTERNAL MEDICINE

## 2022-01-13 PROCEDURE — 87086 URINE CULTURE/COLONY COUNT: CPT | Performed by: INTERNAL MEDICINE

## 2022-01-24 ENCOUNTER — OFFICE VISIT (OUTPATIENT)
Dept: INTERNAL MEDICINE CLINIC | Facility: CLINIC | Age: 87
End: 2022-01-24
Payer: MEDICARE

## 2022-01-24 ENCOUNTER — LAB ENCOUNTER (OUTPATIENT)
Dept: LAB | Age: 87
End: 2022-01-24
Attending: INTERNAL MEDICINE
Payer: MEDICARE

## 2022-01-24 VITALS
WEIGHT: 165 LBS | BODY MASS INDEX: 23.1 KG/M2 | SYSTOLIC BLOOD PRESSURE: 130 MMHG | DIASTOLIC BLOOD PRESSURE: 60 MMHG | OXYGEN SATURATION: 100 % | HEART RATE: 70 BPM | RESPIRATION RATE: 14 BRPM | HEIGHT: 71 IN

## 2022-01-24 DIAGNOSIS — I48.20 CHRONIC ATRIAL FIBRILLATION (HCC): Primary | ICD-10-CM

## 2022-01-24 DIAGNOSIS — I10 ESSENTIAL HYPERTENSION WITH GOAL BLOOD PRESSURE LESS THAN 140/90: ICD-10-CM

## 2022-01-24 DIAGNOSIS — E78.00 HYPERCHOLESTEROLEMIA: ICD-10-CM

## 2022-01-24 PROBLEM — I48.19 PERSISTENT ATRIAL FIBRILLATION (HCC): Status: RESOLVED | Noted: 2019-03-28 | Resolved: 2022-01-24

## 2022-01-24 LAB
ALBUMIN SERPL-MCNC: 2.7 G/DL (ref 3.4–5)
ALBUMIN/GLOB SERPL: 0.6 {RATIO} (ref 1–2)
ALP LIVER SERPL-CCNC: 197 U/L
ALT SERPL-CCNC: 20 U/L
ANION GAP SERPL CALC-SCNC: 1 MMOL/L (ref 0–18)
AST SERPL-CCNC: 29 U/L (ref 15–37)
BILIRUB SERPL-MCNC: 0.6 MG/DL (ref 0.1–2)
BUN BLD-MCNC: 17 MG/DL (ref 7–18)
BUN/CREAT SERPL: 17.7 (ref 10–20)
CALCIUM BLD-MCNC: 9 MG/DL (ref 8.5–10.1)
CHLORIDE SERPL-SCNC: 102 MMOL/L (ref 98–112)
CO2 SERPL-SCNC: 31 MMOL/L (ref 21–32)
CREAT BLD-MCNC: 0.96 MG/DL
DEPRECATED RDW RBC AUTO: 54.6 FL (ref 35.1–46.3)
ERYTHROCYTE [DISTWIDTH] IN BLOOD BY AUTOMATED COUNT: 14.8 % (ref 11–15)
FASTING STATUS PATIENT QL REPORTED: NO
GLOBULIN PLAS-MCNC: 4.6 G/DL (ref 2.8–4.4)
GLUCOSE BLD-MCNC: 129 MG/DL (ref 70–99)
HCT VFR BLD AUTO: 41.3 %
HGB BLD-MCNC: 12.9 G/DL
MCH RBC QN AUTO: 31.8 PG (ref 26–34)
MCHC RBC AUTO-ENTMCNC: 31.2 G/DL (ref 31–37)
MCV RBC AUTO: 101.7 FL
OSMOLALITY SERPL CALC.SUM OF ELEC: 281 MOSM/KG (ref 275–295)
PLATELET # BLD AUTO: 176 10(3)UL (ref 150–450)
POTASSIUM SERPL-SCNC: 4.2 MMOL/L (ref 3.5–5.1)
PROT SERPL-MCNC: 7.3 G/DL (ref 6.4–8.2)
RBC # BLD AUTO: 4.06 X10(6)UL
SODIUM SERPL-SCNC: 134 MMOL/L (ref 136–145)
WBC # BLD AUTO: 8.2 X10(3) UL (ref 4–11)

## 2022-01-24 PROCEDURE — 80053 COMPREHEN METABOLIC PANEL: CPT

## 2022-01-24 PROCEDURE — 99214 OFFICE O/P EST MOD 30 MIN: CPT | Performed by: INTERNAL MEDICINE

## 2022-01-24 PROCEDURE — 85027 COMPLETE CBC AUTOMATED: CPT

## 2022-01-24 PROCEDURE — 36415 COLL VENOUS BLD VENIPUNCTURE: CPT

## 2022-01-25 NOTE — PROGRESS NOTES
Mera Harvey is a 80year old male. Patient presents with: Follow - Up  Hypertension    HPI:   80-year-old gentleman here for follow-up. He is accompanied with his daughter. He reports that he is doing well. He is eating good, sleeping good.   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 Respiratory: Negative for cough and shortness of breath. Cardiovascular: Negative for chest pain. Gastrointestinal: Negative for abdominal distention, abdominal pain and vomiting.    Genitourinary: Negative for hematuria controlled. Continue current regimen. Check kidney functions  - CBC, PLATELET; NO DIFFERENTIAL; Future  - COMP METABOLIC PANEL (14); Future    3. Hypercholesterolemia  Continue statins. Check liver function test.    Gout-stable    BPH-stable    Plan:  Esme Valencia

## 2022-02-10 ENCOUNTER — OFFICE VISIT (OUTPATIENT)
Dept: PODIATRY CLINIC | Facility: CLINIC | Age: 87
End: 2022-02-10
Payer: MEDICARE

## 2022-02-10 DIAGNOSIS — M79.675 PAIN IN TOES OF BOTH FEET: Primary | ICD-10-CM

## 2022-02-10 DIAGNOSIS — B35.1 ONYCHOMYCOSIS: ICD-10-CM

## 2022-02-10 DIAGNOSIS — M79.674 PAIN IN TOES OF BOTH FEET: Primary | ICD-10-CM

## 2022-02-10 PROCEDURE — 11721 DEBRIDE NAIL 6 OR MORE: CPT | Performed by: PODIATRIST

## 2022-03-08 ENCOUNTER — PATIENT MESSAGE (OUTPATIENT)
Dept: INTERNAL MEDICINE CLINIC | Facility: CLINIC | Age: 87
End: 2022-03-08

## 2022-03-08 NOTE — TELEPHONE ENCOUNTER
Arrange video visit , if  its hard to come in person  Ok to take lasix and K if needed on a daily basis    Paola Springer

## 2022-03-08 NOTE — TELEPHONE ENCOUNTER
From: Michelle Rivers  To: Maribel Vann MD  Sent: 3/8/2022 3:04 PM CST  Subject: weight gain/lasix/potassium    : Dad's been putting some pounds on over the last few weeks. In general it's been our goal to have approx 165lbs (dry weight in the mornings). About 2/24 he popped into 168s and we have been giving lasix as needed. I do see evidence of fluid retention in his ankles/feet by night - even with a lay down nap in the day and encouraging walking for circulation. With the lasix we can get to roughly 169 but if we try to skip a day he pops to 170. His BP is in the 140s/130s (no amlodipine) and pulse/ox has been about the same 99/58 to 99/61. Is it safe to give Lasix everyday? still Match with potassium? His appetite/intake is also about the same. We keep fluid intake between 51-64 oz. Seems like something may have changed - He does have the A-fib and aortic stenosis. Dad does go thru some phases and this may be just that but want to check in with you about this.  Thank you, Tai Juarez

## 2022-03-08 NOTE — TELEPHONE ENCOUNTER
Dr. Debo Martin, pt saw you on 1/24/22 and your prog note indicates f/u in 4 months. Do you want him to come in sooner or address questions here?

## 2022-03-08 NOTE — TELEPHONE ENCOUNTER
Spoke to Ros(daughter on hipaa) and advised of Dr Fany Benson note. Daughter verbalized understanding. Video visit made for 3/10/2022 at 4:30pm with Dr Rene Perez. Jay Govea over instructions, copay and that provider will be calling from a restricted/unknown number to make sure able to accept/pickup those calls. Daughter agreed. Advised daughter that if any issues come up before then to give us a call back, if after hours can page the on call doctor, or go to the ER. Daughter agreed.

## 2022-03-09 ENCOUNTER — PATIENT MESSAGE (OUTPATIENT)
Dept: INTERNAL MEDICINE CLINIC | Facility: CLINIC | Age: 87
End: 2022-03-09

## 2022-03-10 ENCOUNTER — TELEMEDICINE (OUTPATIENT)
Dept: INTERNAL MEDICINE CLINIC | Facility: CLINIC | Age: 87
End: 2022-03-10

## 2022-03-10 DIAGNOSIS — R60.0 BILATERAL LOWER EXTREMITY EDEMA: Primary | ICD-10-CM

## 2022-03-10 PROCEDURE — 99213 OFFICE O/P EST LOW 20 MIN: CPT | Performed by: INTERNAL MEDICINE

## 2022-03-10 NOTE — TELEPHONE ENCOUNTER
From: Meme Rudolph  To: Fernando Granados MD  Sent: 3/9/2022 9:18 PM CST  Subject: Dr. Mervat Kimball Echo results from 1/11/22    For your information and for dad's file.

## 2022-03-14 NOTE — TELEPHONE ENCOUNTER
Patient's daughter Dmitri requesting a call back, she is requesting CMP lab panel instead of the [address] [phone]

## 2022-03-14 NOTE — TELEPHONE ENCOUNTER
I have placed an order for CBC and BMP. He can get it done. They can use Lasix as instructed.   If he is developing any shortness of breath or worsening swelling, maybe it is better to be evaluated in the emergency room    Thank you

## 2022-03-14 NOTE — TELEPHONE ENCOUNTER
Reviewed 's instructions for labs. Daughter states that she believes the pt's swelling has not increased since Saturday. No shortness of breath. Will proceed with labs and normal regime for lasix.

## 2022-03-15 ENCOUNTER — LAB ENCOUNTER (OUTPATIENT)
Dept: LAB | Age: 87
End: 2022-03-15
Attending: INTERNAL MEDICINE
Payer: MEDICARE

## 2022-03-15 ENCOUNTER — PATIENT MESSAGE (OUTPATIENT)
Dept: INTERNAL MEDICINE CLINIC | Facility: CLINIC | Age: 87
End: 2022-03-15

## 2022-03-15 DIAGNOSIS — I50.9 CONGESTIVE HEART FAILURE, UNSPECIFIED HF CHRONICITY, UNSPECIFIED HEART FAILURE TYPE (HCC): ICD-10-CM

## 2022-03-15 LAB
ALBUMIN SERPL-MCNC: 2.9 G/DL (ref 3.4–5)
ALBUMIN/GLOB SERPL: 0.7 {RATIO} (ref 1–2)
ALP LIVER SERPL-CCNC: 118 U/L
ALT SERPL-CCNC: 18 U/L
ANION GAP SERPL CALC-SCNC: 5 MMOL/L (ref 0–18)
AST SERPL-CCNC: 28 U/L (ref 15–37)
BILIRUB SERPL-MCNC: 0.9 MG/DL (ref 0.1–2)
BUN BLD-MCNC: 20 MG/DL (ref 7–18)
BUN/CREAT SERPL: 16.5 (ref 10–20)
CALCIUM BLD-MCNC: 8.5 MG/DL (ref 8.5–10.1)
CHLORIDE SERPL-SCNC: 97 MMOL/L (ref 98–112)
CO2 SERPL-SCNC: 29 MMOL/L (ref 21–32)
CREAT BLD-MCNC: 1.21 MG/DL
DEPRECATED RDW RBC AUTO: 50.8 FL (ref 35.1–46.3)
ERYTHROCYTE [DISTWIDTH] IN BLOOD BY AUTOMATED COUNT: 13.7 % (ref 11–15)
FASTING STATUS PATIENT QL REPORTED: NO
GLOBULIN PLAS-MCNC: 4.3 G/DL (ref 2.8–4.4)
GLUCOSE BLD-MCNC: 116 MG/DL (ref 70–99)
HCT VFR BLD AUTO: 41.1 %
HGB BLD-MCNC: 13 G/DL
MCH RBC QN AUTO: 31.9 PG (ref 26–34)
MCHC RBC AUTO-ENTMCNC: 31.6 G/DL (ref 31–37)
MCV RBC AUTO: 100.7 FL
OSMOLALITY SERPL CALC.SUM OF ELEC: 276 MOSM/KG (ref 275–295)
PLATELET # BLD AUTO: 193 10(3)UL (ref 150–450)
POTASSIUM SERPL-SCNC: 4 MMOL/L (ref 3.5–5.1)
PROT SERPL-MCNC: 7.2 G/DL (ref 6.4–8.2)
RBC # BLD AUTO: 4.08 X10(6)UL
WBC # BLD AUTO: 7.7 X10(3) UL (ref 4–11)

## 2022-03-15 PROCEDURE — 85027 COMPLETE CBC AUTOMATED: CPT

## 2022-03-15 PROCEDURE — 80053 COMPREHEN METABOLIC PANEL: CPT

## 2022-03-15 PROCEDURE — 36415 COLL VENOUS BLD VENIPUNCTURE: CPT

## 2022-03-17 NOTE — TELEPHONE ENCOUNTER
From: Carolina Woodward  To: Darron Golden MD  Sent: 3/9/2022 9:18 PM CST  Subject: Dr. Sarah Jensen Echo results from 1/11/22    For your information and for dad's file.

## 2022-03-18 NOTE — TELEPHONE ENCOUNTER
Noted   If there is any worsening symptoms, it is better to be evaluated emergency room. Please continue with Lasix at the current dosage for now.   If there is any worsening swelling, please reach out to Dr. Dina Bowie office

## 2022-03-21 NOTE — TELEPHONE ENCOUNTER
Please see daughters mychart message. Patient is getting a BMP lab draw today before Dr Jim Barbour appt . Daughter is requesting a urine testing. UA and culture pended for approval/review.

## 2022-03-22 ENCOUNTER — LAB ENCOUNTER (OUTPATIENT)
Dept: LAB | Facility: HOSPITAL | Age: 87
End: 2022-03-22
Attending: INTERNAL MEDICINE
Payer: MEDICARE

## 2022-03-22 DIAGNOSIS — I10 HYPERTENSION, UNSPECIFIED TYPE: ICD-10-CM

## 2022-03-22 DIAGNOSIS — N39.0 URINARY TRACT INFECTION WITHOUT HEMATURIA, SITE UNSPECIFIED: ICD-10-CM

## 2022-03-22 LAB
ANION GAP SERPL CALC-SCNC: 6 MMOL/L (ref 0–18)
BILIRUB UR QL: NEGATIVE
BUN BLD-MCNC: 22 MG/DL (ref 7–18)
BUN/CREAT SERPL: 21 (ref 10–20)
CALCIUM BLD-MCNC: 8.9 MG/DL (ref 8.5–10.1)
CHLORIDE SERPL-SCNC: 97 MMOL/L (ref 98–112)
CLARITY UR: CLEAR
CO2 SERPL-SCNC: 30 MMOL/L (ref 21–32)
COLOR UR: YELLOW
CREAT BLD-MCNC: 1.05 MG/DL
FASTING STATUS PATIENT QL REPORTED: YES
GLUCOSE BLD-MCNC: 86 MG/DL (ref 70–99)
GLUCOSE UR-MCNC: NEGATIVE MG/DL
HGB UR QL STRIP.AUTO: NEGATIVE
KETONES UR-MCNC: NEGATIVE MG/DL
LEUKOCYTE ESTERASE UR QL STRIP.AUTO: NEGATIVE
NITRITE UR QL STRIP.AUTO: NEGATIVE
OSMOLALITY SERPL CALC.SUM OF ELEC: 279 MOSM/KG (ref 275–295)
PH UR: 8 [PH] (ref 5–8)
POTASSIUM SERPL-SCNC: 4.9 MMOL/L (ref 3.5–5.1)
PROT UR-MCNC: NEGATIVE MG/DL
SODIUM SERPL-SCNC: 133 MMOL/L (ref 136–145)
SP GR UR STRIP: 1.01 (ref 1–1.03)
UROBILINOGEN UR STRIP-ACNC: 2
VIT C UR-MCNC: NEGATIVE MG/DL

## 2022-03-22 PROCEDURE — 36415 COLL VENOUS BLD VENIPUNCTURE: CPT

## 2022-03-22 PROCEDURE — 80048 BASIC METABOLIC PNL TOTAL CA: CPT

## 2022-03-22 PROCEDURE — 87086 URINE CULTURE/COLONY COUNT: CPT

## 2022-03-22 PROCEDURE — 81003 URINALYSIS AUTO W/O SCOPE: CPT

## 2022-03-23 RX ORDER — POTASSIUM CHLORIDE 1500 MG/1
1 TABLET, FILM COATED, EXTENDED RELEASE ORAL EVERY MORNING
Qty: 90 TABLET | Refills: 1 | Status: SHIPPED | OUTPATIENT
Start: 2022-03-23

## 2022-03-28 RX ORDER — TAMSULOSIN HYDROCHLORIDE 0.4 MG/1
CAPSULE ORAL
Qty: 90 CAPSULE | Refills: 0 | Status: SHIPPED | OUTPATIENT
Start: 2022-03-28

## 2022-03-28 RX ORDER — LOVASTATIN 40 MG/1
TABLET ORAL
Qty: 90 TABLET | Refills: 1 | Status: SHIPPED | OUTPATIENT
Start: 2022-03-28

## 2022-03-28 RX ORDER — ALLOPURINOL 300 MG/1
TABLET ORAL
Qty: 90 TABLET | Refills: 0 | Status: SHIPPED | OUTPATIENT
Start: 2022-03-28

## 2022-03-28 NOTE — TELEPHONE ENCOUNTER
Pt's daughter would like to know if a repeat urine culture is warranted due to possible \"contamination\" of the last specimen. Please advise.

## 2022-04-02 ENCOUNTER — LAB ENCOUNTER (OUTPATIENT)
Dept: LAB | Age: 87
End: 2022-04-02
Attending: INTERNAL MEDICINE
Payer: MEDICARE

## 2022-04-02 DIAGNOSIS — I10 ESSENTIAL HYPERTENSION WITH GOAL BLOOD PRESSURE LESS THAN 140/90: ICD-10-CM

## 2022-04-02 LAB
ANION GAP SERPL CALC-SCNC: 5 MMOL/L (ref 0–18)
BUN BLD-MCNC: 26 MG/DL (ref 7–18)
BUN/CREAT SERPL: 23 (ref 10–20)
CALCIUM BLD-MCNC: 8.6 MG/DL (ref 8.5–10.1)
CHLORIDE SERPL-SCNC: 99 MMOL/L (ref 98–112)
CO2 SERPL-SCNC: 31 MMOL/L (ref 21–32)
CREAT BLD-MCNC: 1.13 MG/DL
FASTING STATUS PATIENT QL REPORTED: NO
GLUCOSE BLD-MCNC: 131 MG/DL (ref 70–99)
OSMOLALITY SERPL CALC.SUM OF ELEC: 287 MOSM/KG (ref 275–295)
POTASSIUM SERPL-SCNC: 4.2 MMOL/L (ref 3.5–5.1)
SODIUM SERPL-SCNC: 135 MMOL/L (ref 136–145)

## 2022-04-02 PROCEDURE — 36415 COLL VENOUS BLD VENIPUNCTURE: CPT

## 2022-04-02 PROCEDURE — 80048 BASIC METABOLIC PNL TOTAL CA: CPT

## 2022-04-08 NOTE — DIETARY NOTE
ADULT NUTRITION [address] [facility] NUTR ASSESS:8243}    Pt is at {Nutrition Risk:97424} nutrition risk. Pt {Meets/Does Not Meet:6564} malnutrition criteria. CRITERIA FOR MALNUTRITION DIAGNOSIS:  {Criteria for Mal KA:057261375}.     RECOMMENDATIONS TO MD:  {RECOMMEN piperacillin-tazobactam  3.375 g Intravenous Q8H   • dextrose         LABS: {[facility] IP NUTR EUWI:362174123}  Recent Labs     07/22/21  1435 07/23/21  0536   GLU 73 61*   BUN 21* 16   CREATSERUM 1.13 0.95   CA 9.4 8.7   * 138   K 4.2 3.8    106   C Supplements:532565007}  - Feeding assistance: {feeding assistance:860621010}  - Nutrition education: {nutreducation:8473}    - Coordination of nutrition care: {nutrition care coordination:171933772}  - Discharge and transfer of nutrition care to [facility] - - -

## 2022-04-12 ENCOUNTER — LAB ENCOUNTER (OUTPATIENT)
Dept: LAB | Age: 87
End: 2022-04-12
Attending: NURSE PRACTITIONER
Payer: MEDICARE

## 2022-04-12 DIAGNOSIS — I25.10 CORONARY ATHEROSCLEROSIS OF NATIVE CORONARY ARTERY: ICD-10-CM

## 2022-04-12 DIAGNOSIS — I35.0 NODULAR CALCIFIC AORTIC VALVE STENOSIS: Primary | ICD-10-CM

## 2022-04-12 DIAGNOSIS — I48.19 PERSISTENT ATRIAL FIBRILLATION (HCC): ICD-10-CM

## 2022-04-12 LAB
ANION GAP SERPL CALC-SCNC: 7 MMOL/L (ref 0–18)
BUN BLD-MCNC: 22 MG/DL (ref 7–18)
BUN/CREAT SERPL: 18.6 (ref 10–20)
CALCIUM BLD-MCNC: 8.7 MG/DL (ref 8.5–10.1)
CHLORIDE SERPL-SCNC: 94 MMOL/L (ref 98–112)
CO2 SERPL-SCNC: 29 MMOL/L (ref 21–32)
CREAT BLD-MCNC: 1.18 MG/DL
FASTING STATUS PATIENT QL REPORTED: NO
GLUCOSE BLD-MCNC: 119 MG/DL (ref 70–99)
OSMOLALITY SERPL CALC.SUM OF ELEC: 274 MOSM/KG (ref 275–295)
POTASSIUM SERPL-SCNC: 3.9 MMOL/L (ref 3.5–5.1)
SODIUM SERPL-SCNC: 130 MMOL/L (ref 136–145)

## 2022-04-12 PROCEDURE — 36415 COLL VENOUS BLD VENIPUNCTURE: CPT

## 2022-04-12 PROCEDURE — 80048 BASIC METABOLIC PNL TOTAL CA: CPT

## 2022-04-21 ENCOUNTER — LAB ENCOUNTER (OUTPATIENT)
Dept: LAB | Age: 87
End: 2022-04-21
Attending: NURSE PRACTITIONER
Payer: MEDICARE

## 2022-04-21 DIAGNOSIS — R60.0 LOCALIZED EDEMA: Primary | ICD-10-CM

## 2022-04-21 LAB
ANION GAP SERPL CALC-SCNC: 4 MMOL/L (ref 0–18)
BASOPHILS # BLD AUTO: 0.04 X10(3) UL (ref 0–0.2)
BASOPHILS NFR BLD AUTO: 0.6 %
BUN BLD-MCNC: 20 MG/DL (ref 7–18)
BUN/CREAT SERPL: 16.5 (ref 10–20)
CALCIUM BLD-MCNC: 9.2 MG/DL (ref 8.5–10.1)
CHLORIDE SERPL-SCNC: 98 MMOL/L (ref 98–112)
CO2 SERPL-SCNC: 32 MMOL/L (ref 21–32)
CREAT BLD-MCNC: 1.21 MG/DL
DEPRECATED RDW RBC AUTO: 52.5 FL (ref 35.1–46.3)
EOSINOPHIL # BLD AUTO: 0.27 X10(3) UL (ref 0–0.7)
EOSINOPHIL NFR BLD AUTO: 4.3 %
ERYTHROCYTE [DISTWIDTH] IN BLOOD BY AUTOMATED COUNT: 14.5 % (ref 11–15)
FASTING STATUS PATIENT QL REPORTED: NO
GLUCOSE BLD-MCNC: 111 MG/DL (ref 70–99)
HCT VFR BLD AUTO: 42.1 %
HGB BLD-MCNC: 13.4 G/DL
IMM GRANULOCYTES # BLD AUTO: 0.01 X10(3) UL (ref 0–1)
IMM GRANULOCYTES NFR BLD: 0.2 %
LYMPHOCYTES # BLD AUTO: 1.07 X10(3) UL (ref 1–4)
LYMPHOCYTES NFR BLD AUTO: 17.2 %
MCH RBC QN AUTO: 31.3 PG (ref 26–34)
MCHC RBC AUTO-ENTMCNC: 31.8 G/DL (ref 31–37)
MCV RBC AUTO: 98.4 FL
MONOCYTES # BLD AUTO: 0.69 X10(3) UL (ref 0.1–1)
MONOCYTES NFR BLD AUTO: 11.1 %
NEUTROPHILS # BLD AUTO: 4.14 X10 (3) UL (ref 1.5–7.7)
NEUTROPHILS # BLD AUTO: 4.14 X10(3) UL (ref 1.5–7.7)
NEUTROPHILS NFR BLD AUTO: 66.6 %
OSMOLALITY SERPL CALC.SUM OF ELEC: 281 MOSM/KG (ref 275–295)
PLATELET # BLD AUTO: 132 10(3)UL (ref 150–450)
POTASSIUM SERPL-SCNC: 4.3 MMOL/L (ref 3.5–5.1)
RBC # BLD AUTO: 4.28 X10(6)UL
SODIUM SERPL-SCNC: 134 MMOL/L (ref 136–145)
WBC # BLD AUTO: 6.2 X10(3) UL (ref 4–11)

## 2022-04-21 PROCEDURE — 85025 COMPLETE CBC W/AUTO DIFF WBC: CPT

## 2022-04-21 PROCEDURE — 36415 COLL VENOUS BLD VENIPUNCTURE: CPT

## 2022-04-21 PROCEDURE — 80048 BASIC METABOLIC PNL TOTAL CA: CPT

## 2022-04-25 ENCOUNTER — OFFICE VISIT (OUTPATIENT)
Dept: INTERNAL MEDICINE CLINIC | Facility: CLINIC | Age: 87
End: 2022-04-25
Payer: MEDICARE

## 2022-04-25 VITALS
OXYGEN SATURATION: 97 % | SYSTOLIC BLOOD PRESSURE: 130 MMHG | HEART RATE: 60 BPM | DIASTOLIC BLOOD PRESSURE: 60 MMHG | RESPIRATION RATE: 14 BRPM | WEIGHT: 175 LBS | BODY MASS INDEX: 24.5 KG/M2 | HEIGHT: 71 IN

## 2022-04-25 DIAGNOSIS — I48.20 CHRONIC ATRIAL FIBRILLATION (HCC): ICD-10-CM

## 2022-04-25 DIAGNOSIS — I87.2 VENOUS INSUFFICIENCY (CHRONIC) (PERIPHERAL): ICD-10-CM

## 2022-04-25 DIAGNOSIS — I10 ESSENTIAL HYPERTENSION WITH GOAL BLOOD PRESSURE LESS THAN 140/90: Primary | ICD-10-CM

## 2022-04-25 PROCEDURE — 99214 OFFICE O/P EST MOD 30 MIN: CPT | Performed by: INTERNAL MEDICINE

## 2022-04-25 RX ORDER — BUMETANIDE 0.5 MG/1
0.5 TABLET ORAL 2 TIMES DAILY
COMMUNITY
Start: 2022-04-21

## 2022-05-05 ENCOUNTER — APPOINTMENT (OUTPATIENT)
Dept: URBAN - METROPOLITAN AREA CLINIC 244 | Age: 87
Setting detail: DERMATOLOGY
End: 2022-05-05

## 2022-05-05 DIAGNOSIS — L82.1 OTHER SEBORRHEIC KERATOSIS: ICD-10-CM

## 2022-05-05 DIAGNOSIS — L82.0 INFLAMED SEBORRHEIC KERATOSIS: ICD-10-CM

## 2022-05-05 DIAGNOSIS — D22 MELANOCYTIC NEVI: ICD-10-CM

## 2022-05-05 DIAGNOSIS — Z85.828 PERSONAL HISTORY OF OTHER MALIGNANT NEOPLASM OF SKIN: ICD-10-CM

## 2022-05-05 DIAGNOSIS — L57.0 ACTINIC KERATOSIS: ICD-10-CM

## 2022-05-05 DIAGNOSIS — L81.4 OTHER MELANIN HYPERPIGMENTATION: ICD-10-CM

## 2022-05-05 PROBLEM — D22.4 MELANOCYTIC NEVI OF SCALP AND NECK: Status: ACTIVE | Noted: 2022-05-05

## 2022-05-05 PROBLEM — D22.5 MELANOCYTIC NEVI OF TRUNK: Status: ACTIVE | Noted: 2022-05-05

## 2022-05-05 PROBLEM — D22.61 MELANOCYTIC NEVI OF RIGHT UPPER LIMB, INCLUDING SHOULDER: Status: ACTIVE | Noted: 2022-05-05

## 2022-05-05 PROBLEM — D22.62 MELANOCYTIC NEVI OF LEFT UPPER LIMB, INCLUDING SHOULDER: Status: ACTIVE | Noted: 2022-05-05

## 2022-05-05 PROCEDURE — 11306 SHAVE SKIN LESION 0.6-1.0 CM: CPT

## 2022-05-05 PROCEDURE — OTHER SHAVE REMOVAL: OTHER

## 2022-05-05 PROCEDURE — 17000 DESTRUCT PREMALG LESION: CPT | Mod: 59

## 2022-05-05 PROCEDURE — 17003 DESTRUCT PREMALG LES 2-14: CPT | Mod: 59

## 2022-05-05 PROCEDURE — 99213 OFFICE O/P EST LOW 20 MIN: CPT | Mod: 25

## 2022-05-05 PROCEDURE — OTHER LIQUID NITROGEN: OTHER

## 2022-05-05 PROCEDURE — 17110 DESTRUCT B9 LESION 1-14: CPT | Mod: 59

## 2022-05-05 PROCEDURE — OTHER COUNSELING: OTHER

## 2022-05-05 ASSESSMENT — LOCATION ZONE DERM
LOCATION ZONE: TRUNK
LOCATION ZONE: FACE
LOCATION ZONE: ARM
LOCATION ZONE: EAR
LOCATION ZONE: NECK
LOCATION ZONE: SCALP

## 2022-05-05 ASSESSMENT — LOCATION SIMPLE DESCRIPTION DERM
LOCATION SIMPLE: RIGHT TEMPLE
LOCATION SIMPLE: LEFT UPPER BACK
LOCATION SIMPLE: CHEST
LOCATION SIMPLE: ANTERIOR SCALP
LOCATION SIMPLE: LEFT ANTERIOR NECK
LOCATION SIMPLE: LEFT CHEEK
LOCATION SIMPLE: RIGHT UPPER ARM
LOCATION SIMPLE: LEFT FOREARM
LOCATION SIMPLE: RIGHT EAR
LOCATION SIMPLE: LEFT UPPER ARM

## 2022-05-05 ASSESSMENT — LOCATION DETAILED DESCRIPTION DERM
LOCATION DETAILED: RIGHT ANTECUBITAL SKIN
LOCATION DETAILED: MID-FRONTAL SCALP
LOCATION DETAILED: RIGHT CYMBA CONCHA
LOCATION DETAILED: RIGHT MEDIAL TEMPLE
LOCATION DETAILED: LEFT CLAVICULAR NECK
LOCATION DETAILED: LEFT DISTAL POSTERIOR UPPER ARM
LOCATION DETAILED: LEFT VENTRAL PROXIMAL FOREARM
LOCATION DETAILED: MIDDLE STERNUM
LOCATION DETAILED: RIGHT ANTERIOR DISTAL UPPER ARM
LOCATION DETAILED: RIGHT MEDIAL SUPERIOR CHEST
LOCATION DETAILED: UPPER STERNUM
LOCATION DETAILED: LEFT ANTECUBITAL SKIN
LOCATION DETAILED: LEFT INFERIOR CENTRAL MALAR CHEEK
LOCATION DETAILED: LEFT LATERAL UPPER BACK
LOCATION DETAILED: LEFT ANTERIOR DISTAL UPPER ARM

## 2022-05-05 NOTE — PROCEDURE: LIQUID NITROGEN
Render Post-Care Instructions In Note?: no
Show Aperture Variable?: Yes
Post-Care Instructions: I reviewed with the patient in detail post-care instructions. Patient is to wear sunprotection, and avoid picking at any of the treated lesions. Pt may apply Vaseline to crusted or scabbing areas.
Consent: The patient's consent was obtained including but not limited to risks of crusting, scabbing, blistering, scarring, darker or lighter pigmentary change, recurrence, incomplete removal and infection.
Detail Level: Zone
Number Of Freeze-Thaw Cycles: 1 freeze-thaw cycle
Total Number Of Aks Treated: 5
Medical Necessity Information: It is in your best interest to select a reason for this procedure from the list below. All of these items fulfill various CMS LCD requirements except the new and changing color options.
Duration Of Freeze Thaw-Cycle (Seconds): 0
Duration Of Freeze Thaw-Cycle (Seconds): 5-10
Medical Necessity Clause: This procedure was medically necessary because the lesions that were treated were:
Detail Level: Detailed

## 2022-05-05 NOTE — PROCEDURE: SHAVE REMOVAL
Was A Bandage Applied: Yes
Anesthesia Volume In Cc: 3
X Size Of Lesion In Cm (Optional): 0
Bill 18686 For Specimen Handling/Conveyance To Laboratory?: no
Hemostasis: Aluminum Chloride
Notification Instructions: Patient will be notified of pathology results. However, patient instructed to call the office if not contacted within 2 weeks.
Wound Care: Vaseline
Medical Necessity Information: It is in your best interest to select a reason for this procedure from the list below. All of these items fulfill various CMS LCD requirements except the new and changing color options.
Consent was obtained from the patient. The risks and benefits to therapy were discussed in detail. Specifically, the risks of infection, scarring, bleeding, prolonged wound healing, incomplete removal, allergy to anesthesia, nerve injury and recurrence were addressed.
Anesthesia Type: 1% lidocaine with epinephrine and a 1:10 solution of 8.4% sodium bicarbonate
Size Of Lesion In Cm (Required): 0.7
Detail Level: Detailed
Billing Type: Third-Party Bill
Medical Necessity Clause: This procedure was medically necessary because the lesion that was treated was:
Post-Care Instructions: I reviewed with the patient in detail post-care instructions. Patient is to keep the biopsy site dry overnight, and then apply petrolatum twice daily until healed or after one or two night patient may leave area open and dry and a scab will form which will eventually fall off in a few weeks without further care other than cleaning twice a day.
Biopsy Method: Dermablade

## 2022-05-05 NOTE — HPI: EVALUATION OF SKIN LESION(S)
What Type Of Note Output Would You Prefer (Optional)?: Bullet Format
How Severe Are Your Spot(S)?: mild
Have Your Spot(S) Been Treated In The Past?: has been treated
Hpi Title: Evaluation of a Skin Lesion
When Was It Treated?: 02/2021

## 2022-05-12 ENCOUNTER — OFFICE VISIT (OUTPATIENT)
Dept: PODIATRY CLINIC | Facility: CLINIC | Age: 87
End: 2022-05-12
Payer: MEDICARE

## 2022-05-12 DIAGNOSIS — M79.674 PAIN IN TOES OF BOTH FEET: Primary | ICD-10-CM

## 2022-05-12 DIAGNOSIS — M79.675 PAIN IN TOES OF BOTH FEET: Primary | ICD-10-CM

## 2022-05-12 DIAGNOSIS — B35.1 ONYCHOMYCOSIS: ICD-10-CM

## 2022-05-16 RX ORDER — OMEPRAZOLE 20 MG/1
20 CAPSULE, DELAYED RELEASE ORAL
Qty: 90 CAPSULE | Refills: 1 | Status: SHIPPED | OUTPATIENT
Start: 2022-05-16 | End: 2022-11-02

## 2022-05-26 ENCOUNTER — LAB ENCOUNTER (OUTPATIENT)
Dept: LAB | Age: 87
End: 2022-05-26
Attending: INTERNAL MEDICINE
Payer: MEDICARE

## 2022-05-26 DIAGNOSIS — I10 ESSENTIAL HYPERTENSION WITH GOAL BLOOD PRESSURE LESS THAN 140/90: ICD-10-CM

## 2022-05-26 LAB
ANION GAP SERPL CALC-SCNC: 7 MMOL/L (ref 0–18)
BUN BLD-MCNC: 26 MG/DL (ref 7–18)
BUN/CREAT SERPL: 21.7 (ref 10–20)
CALCIUM BLD-MCNC: 9.4 MG/DL (ref 8.5–10.1)
CHLORIDE SERPL-SCNC: 103 MMOL/L (ref 98–112)
CO2 SERPL-SCNC: 29 MMOL/L (ref 21–32)
CREAT BLD-MCNC: 1.2 MG/DL
FASTING STATUS PATIENT QL REPORTED: NO
GLUCOSE BLD-MCNC: 113 MG/DL (ref 70–99)
OSMOLALITY SERPL CALC.SUM OF ELEC: 294 MOSM/KG (ref 275–295)
POTASSIUM SERPL-SCNC: 4.3 MMOL/L (ref 3.5–5.1)
SODIUM SERPL-SCNC: 139 MMOL/L (ref 136–145)

## 2022-05-26 PROCEDURE — 80048 BASIC METABOLIC PNL TOTAL CA: CPT

## 2022-05-26 PROCEDURE — 36415 COLL VENOUS BLD VENIPUNCTURE: CPT

## 2022-06-02 ENCOUNTER — APPOINTMENT (OUTPATIENT)
Dept: URBAN - METROPOLITAN AREA CLINIC 244 | Age: 87
Setting detail: DERMATOLOGY
End: 2022-06-03

## 2022-06-02 PROBLEM — C44.41 BASAL CELL CARCINOMA OF SKIN OF SCALP AND NECK: Status: ACTIVE | Noted: 2022-06-02

## 2022-06-02 PROCEDURE — 17270 DSTR MAL LES S/N/H/F/G .5 /<: CPT

## 2022-06-02 PROCEDURE — OTHER CURETTAGE AND DESTRUCTION: OTHER

## 2022-06-02 NOTE — PROCEDURE: CURETTAGE AND DESTRUCTION
Number Of Curettages: 3
Consent was obtained from the patient. The risks, benefits and alternatives to therapy were discussed in detail. Specifically, the risks of infection, scarring, bleeding, prolonged wound healing, nerve injury, incomplete removal, allergy to anesthesia and recurrence were addressed. Alternatives to ED&C, such as: surgical removal and XRT were also discussed.  Prior to the procedure, the treatment site was clearly identified and confirmed by the patient. All components of Universal Protocol/PAUSE Rule completed.
Add Intralesional Injection: No
What Was Performed First?: Curettage
Additional Information: (Optional): The wound was cleaned, and a pressure dressing was applied.  The patient received detailed post-op instructions.
Bill As A Line Item Or As Units: Line Item
Size Of Lesion After Curettage: 0
Biopsy Photograph Reviewed: Yes
Detail Level: Detailed
Post-Care Instructions: I reviewed with the patient in detail post-care instructions. Patient is to keep the area dry for 48 hours, and not to engage in any swimming until the area is healed. Should the patient develop any fevers, chills, bleeding, severe pain patient will contact the office immediately.
Anesthesia Type: 1% lidocaine with epinephrine and a 1:10 solution of 8.4% sodium bicarbonate
Cautery Type: electrodesiccation
Total Volume (Ccs): 1
Size Of Lesion In Cm: 1.4
Final Size Statement: The size of the lesion after curettage was

## 2022-06-06 ENCOUNTER — TELEPHONE (OUTPATIENT)
Dept: PODIATRY CLINIC | Facility: CLINIC | Age: 87
End: 2022-06-06

## 2022-06-06 NOTE — TELEPHONE ENCOUNTER
Per daughter pt's nail is red and tender and asking if pt can be seen sooner than first available in July. Per daughter pt will go to either location and requesting an afternoon appointment.  Please advise

## 2022-06-06 NOTE — TELEPHONE ENCOUNTER
Spoke with daughter states toe is red and tender. Denies warmth, drainage, fever or chills. Patient scheduled for 6/9/22.

## 2022-06-09 ENCOUNTER — OFFICE VISIT (OUTPATIENT)
Dept: PODIATRY CLINIC | Facility: CLINIC | Age: 87
End: 2022-06-09
Payer: MEDICARE

## 2022-06-09 DIAGNOSIS — L60.0 INGROWN TOENAIL OF RIGHT FOOT: Primary | ICD-10-CM

## 2022-06-09 PROCEDURE — 1126F AMNT PAIN NOTED NONE PRSNT: CPT | Performed by: PODIATRIST

## 2022-06-09 PROCEDURE — 99212 OFFICE O/P EST SF 10 MIN: CPT | Performed by: PODIATRIST

## 2022-06-23 RX ORDER — TAMSULOSIN HYDROCHLORIDE 0.4 MG/1
CAPSULE ORAL
Qty: 90 CAPSULE | Refills: 0 | Status: SHIPPED | OUTPATIENT
Start: 2022-06-23

## 2022-06-23 RX ORDER — ALLOPURINOL 300 MG/1
TABLET ORAL
Qty: 90 TABLET | Refills: 0 | Status: SHIPPED | OUTPATIENT
Start: 2022-06-23

## 2022-06-27 ENCOUNTER — OFFICE VISIT (OUTPATIENT)
Dept: INTERNAL MEDICINE CLINIC | Facility: CLINIC | Age: 87
End: 2022-06-27
Payer: MEDICARE

## 2022-06-27 VITALS
RESPIRATION RATE: 14 BRPM | HEIGHT: 71 IN | OXYGEN SATURATION: 96 % | SYSTOLIC BLOOD PRESSURE: 134 MMHG | HEART RATE: 56 BPM | WEIGHT: 173 LBS | DIASTOLIC BLOOD PRESSURE: 70 MMHG | BODY MASS INDEX: 24.22 KG/M2

## 2022-06-27 DIAGNOSIS — S81.011S LACERATION OF RIGHT KNEE, SEQUELA: Primary | ICD-10-CM

## 2022-06-27 DIAGNOSIS — I48.20 CHRONIC ATRIAL FIBRILLATION (HCC): ICD-10-CM

## 2022-06-27 DIAGNOSIS — I35.0 AORTIC VALVE STENOSIS, ETIOLOGY OF CARDIAC VALVE DISEASE UNSPECIFIED: ICD-10-CM

## 2022-06-27 DIAGNOSIS — I10 ESSENTIAL HYPERTENSION WITH GOAL BLOOD PRESSURE LESS THAN 140/90: ICD-10-CM

## 2022-06-27 PROCEDURE — 99214 OFFICE O/P EST MOD 30 MIN: CPT | Performed by: INTERNAL MEDICINE

## 2022-06-27 PROCEDURE — 1126F AMNT PAIN NOTED NONE PRSNT: CPT | Performed by: INTERNAL MEDICINE

## 2022-08-17 NOTE — HOME CARE LIAISON
Received referral from [address] [place] [address]. Spoke to patient's dtr/ Lázaro Stevenson via phone and she is agreeable to  services. All questions and concerns addressed. Phone number given. JOSI Patricio notified. A-T Advancement Flap Text: The defect edges were debeveled with a #15 scalpel blade.  Given the location of the defect, shape of the defect and the proximity to free margins an A-T advancement flap was deemed most appropriate.  Using a sterile surgical marker, an appropriate advancement flap was drawn incorporating the defect and placing the expected incisions within the relaxed skin tension lines where possible.    The area thus outlined was incised deep to adipose tissue with a #15 scalpel blade.  The skin margins were undermined to an appropriate distance in all directions utilizing iris scissors.

## 2022-08-18 ENCOUNTER — TELEPHONE (OUTPATIENT)
Dept: INTERNAL MEDICINE CLINIC | Facility: CLINIC | Age: 87
End: 2022-08-18

## 2022-08-19 NOTE — TELEPHONE ENCOUNTER
Message # [phone]         2022 04:41p   [RENEE]  To:  From:  LUIS FERNANDO Obrien MD:  Phone#:  ----------------------------------------------------------------------  Prudence Sees [phone] RE PT Annemarie Primrose,  26, QUESTIONS RE POSSIBLE UTI Paged at number :  PAGE: [phone] at :  Aug-  16:41

## 2022-08-19 NOTE — TELEPHONE ENCOUNTER
Talked to the daughter. Patient has back pain after taking penicillin for tooth abscess from the dentist.  I do not think this is from UTI. She will monitor patient and she will call us back if there is any problems.

## 2022-08-23 ENCOUNTER — LAB ENCOUNTER (OUTPATIENT)
Dept: LAB | Age: 87
End: 2022-08-23
Attending: INTERNAL MEDICINE
Payer: MEDICARE

## 2022-08-23 DIAGNOSIS — I48.20 CHRONIC ATRIAL FIBRILLATION (HCC): ICD-10-CM

## 2022-08-23 DIAGNOSIS — I10 ESSENTIAL HYPERTENSION WITH GOAL BLOOD PRESSURE LESS THAN 140/90: ICD-10-CM

## 2022-08-23 LAB
ALBUMIN SERPL-MCNC: 3 G/DL (ref 3.4–5)
ALBUMIN/GLOB SERPL: 0.7 {RATIO} (ref 1–2)
ALP LIVER SERPL-CCNC: 103 U/L
ALT SERPL-CCNC: 16 U/L
ANION GAP SERPL CALC-SCNC: 6 MMOL/L (ref 0–18)
AST SERPL-CCNC: 26 U/L (ref 15–37)
BILIRUB SERPL-MCNC: 0.7 MG/DL (ref 0.1–2)
BUN BLD-MCNC: 26 MG/DL (ref 7–18)
BUN/CREAT SERPL: 21.3 (ref 10–20)
CALCIUM BLD-MCNC: 8.9 MG/DL (ref 8.5–10.1)
CHLORIDE SERPL-SCNC: 103 MMOL/L (ref 98–112)
CHOLEST SERPL-MCNC: 110 MG/DL (ref ?–200)
CO2 SERPL-SCNC: 30 MMOL/L (ref 21–32)
CREAT BLD-MCNC: 1.22 MG/DL
DEPRECATED RDW RBC AUTO: 56.4 FL (ref 35.1–46.3)
ERYTHROCYTE [DISTWIDTH] IN BLOOD BY AUTOMATED COUNT: 14.8 % (ref 11–15)
FASTING PATIENT LIPID ANSWER: NO
FASTING STATUS PATIENT QL REPORTED: NO
GFR SERPLBLD BASED ON 1.73 SQ M-ARVRAT: 54 ML/MIN/1.73M2 (ref 60–?)
GLOBULIN PLAS-MCNC: 4.2 G/DL (ref 2.8–4.4)
GLUCOSE BLD-MCNC: 126 MG/DL (ref 70–99)
HCT VFR BLD AUTO: 42.3 %
HDLC SERPL-MCNC: 44 MG/DL (ref 40–59)
HGB BLD-MCNC: 13.5 G/DL
LDLC SERPL CALC-MCNC: 52 MG/DL (ref ?–100)
MCH RBC QN AUTO: 32.8 PG (ref 26–34)
MCHC RBC AUTO-ENTMCNC: 31.9 G/DL (ref 31–37)
MCV RBC AUTO: 102.7 FL
NONHDLC SERPL-MCNC: 66 MG/DL (ref ?–130)
OSMOLALITY SERPL CALC.SUM OF ELEC: 294 MOSM/KG (ref 275–295)
PLATELET # BLD AUTO: 121 10(3)UL (ref 150–450)
POTASSIUM SERPL-SCNC: 4.2 MMOL/L (ref 3.5–5.1)
PROT SERPL-MCNC: 7.2 G/DL (ref 6.4–8.2)
RBC # BLD AUTO: 4.12 X10(6)UL
SODIUM SERPL-SCNC: 139 MMOL/L (ref 136–145)
T4 FREE SERPL-MCNC: 0.9 NG/DL (ref 0.8–1.7)
TRIGL SERPL-MCNC: 68 MG/DL (ref 30–149)
TSI SER-ACNC: 3.97 MIU/ML (ref 0.36–3.74)
VLDLC SERPL CALC-MCNC: 10 MG/DL (ref 0–30)
WBC # BLD AUTO: 6.5 X10(3) UL (ref 4–11)

## 2022-08-23 PROCEDURE — 84439 ASSAY OF FREE THYROXINE: CPT

## 2022-08-23 PROCEDURE — 84443 ASSAY THYROID STIM HORMONE: CPT

## 2022-08-23 PROCEDURE — 85027 COMPLETE CBC AUTOMATED: CPT

## 2022-08-23 PROCEDURE — 36415 COLL VENOUS BLD VENIPUNCTURE: CPT

## 2022-08-23 PROCEDURE — 80061 LIPID PANEL: CPT

## 2022-08-23 PROCEDURE — 80053 COMPREHEN METABOLIC PANEL: CPT

## 2022-08-25 ENCOUNTER — TELEPHONE (OUTPATIENT)
Dept: INTERNAL MEDICINE CLINIC | Facility: CLINIC | Age: 87
End: 2022-08-25

## 2022-08-25 ENCOUNTER — OFFICE VISIT (OUTPATIENT)
Dept: INTERNAL MEDICINE CLINIC | Facility: CLINIC | Age: 87
End: 2022-08-25
Payer: MEDICARE

## 2022-08-25 ENCOUNTER — LAB ENCOUNTER (OUTPATIENT)
Dept: LAB | Facility: HOSPITAL | Age: 87
End: 2022-08-25
Attending: NURSE PRACTITIONER
Payer: MEDICARE

## 2022-08-25 VITALS
WEIGHT: 176.19 LBS | HEIGHT: 71 IN | RESPIRATION RATE: 20 BRPM | TEMPERATURE: 98 F | DIASTOLIC BLOOD PRESSURE: 62 MMHG | HEART RATE: 59 BPM | SYSTOLIC BLOOD PRESSURE: 149 MMHG | BODY MASS INDEX: 24.67 KG/M2

## 2022-08-25 DIAGNOSIS — M54.50 ACUTE BILATERAL LOW BACK PAIN WITHOUT SCIATICA: Primary | ICD-10-CM

## 2022-08-25 DIAGNOSIS — M54.50 ACUTE BILATERAL LOW BACK PAIN WITHOUT SCIATICA: ICD-10-CM

## 2022-08-25 LAB
BILIRUB UR QL: NEGATIVE
CLARITY UR: CLEAR
COLOR UR: YELLOW
GLUCOSE UR-MCNC: NEGATIVE MG/DL
HGB UR QL STRIP.AUTO: NEGATIVE
KETONES UR-MCNC: NEGATIVE MG/DL
LEUKOCYTE ESTERASE UR QL STRIP.AUTO: NEGATIVE
NITRITE UR QL STRIP.AUTO: NEGATIVE
PH UR: 6.5 [PH] (ref 5–8)
PROT UR-MCNC: NEGATIVE MG/DL
SP GR UR STRIP: 1.01 (ref 1–1.03)
UROBILINOGEN UR STRIP-ACNC: 1

## 2022-08-25 PROCEDURE — 1125F AMNT PAIN NOTED PAIN PRSNT: CPT | Performed by: NURSE PRACTITIONER

## 2022-08-25 PROCEDURE — 99214 OFFICE O/P EST MOD 30 MIN: CPT | Performed by: NURSE PRACTITIONER

## 2022-08-25 PROCEDURE — 81003 URINALYSIS AUTO W/O SCOPE: CPT

## 2022-08-25 NOTE — TELEPHONE ENCOUNTER
Daughter [place] Gamma (on MICKEY) calling with condition update:  See Telephone encounter from 8/18/2022. No fever, no nausea. Just has trouble with back pain, waistband level or little lower, both right and left side. Denies fall or injury. Recently changed from rollator to regular walker, tried a few days, then went back to rollator now. Taking tylenol. He did try using omeprazole twice a day but no improvement. She is asking if he needs imaging for his back. Advised to schedule appointment, which was done.     Future Appointments   Date Time Provider Tri Soriano   8/25/2022  5:00 PM REGINA Stephenson [facility]   9/22/2022  3:00 PM Rieger, Lesley Buckler, DPM [facility] [place]   10/24/2022  1:00 PM Chris Joshi MD [facility]

## 2022-08-25 NOTE — PATIENT INSTRUCTIONS
1) Ice areas of discomfort 15 to 20  minutes four times per day. 2) Vasu LINDO    3) Tylenol to continue only up to 4 grams in a 24 hours.     4) Urinalysis with culture

## 2022-08-26 ENCOUNTER — HOSPITAL ENCOUNTER (OUTPATIENT)
Dept: GENERAL RADIOLOGY | Age: 87
Discharge: HOME | End: 2022-08-26
Attending: NURSE PRACTITIONER
Payer: MEDICARE

## 2022-08-26 DIAGNOSIS — M54.50 ACUTE BILATERAL LOW BACK PAIN WITHOUT SCIATICA: ICD-10-CM

## 2022-08-26 PROCEDURE — 72110 X-RAY EXAM L-2 SPINE 4/>VWS: CPT | Performed by: NURSE PRACTITIONER

## 2022-09-20 DIAGNOSIS — E87.6 HYPOKALEMIA: ICD-10-CM

## 2022-09-21 RX ORDER — POTASSIUM CHLORIDE 1500 MG/1
1 TABLET, FILM COATED, EXTENDED RELEASE ORAL EVERY MORNING
Qty: 90 TABLET | Refills: 1 | Status: SHIPPED | OUTPATIENT
Start: 2022-09-21

## 2022-09-21 RX ORDER — LOVASTATIN 40 MG/1
40 TABLET ORAL NIGHTLY
Qty: 90 TABLET | Refills: 1 | Status: SHIPPED | OUTPATIENT
Start: 2022-09-21

## 2022-09-21 RX ORDER — ALLOPURINOL 300 MG/1
300 TABLET ORAL DAILY
Qty: 90 TABLET | Refills: 1 | Status: SHIPPED | OUTPATIENT
Start: 2022-09-21

## 2022-09-21 RX ORDER — TAMSULOSIN HYDROCHLORIDE 0.4 MG/1
0.4 CAPSULE ORAL DAILY
Qty: 90 CAPSULE | Refills: 1 | Status: SHIPPED | OUTPATIENT
Start: 2022-09-21

## 2022-09-21 NOTE — TELEPHONE ENCOUNTER
Routed to Dr Dilcia Godwin for advise on KCL and allopurinol , thanks. Refill passed per [facility] protocol   Requested Prescriptions   Pending Prescriptions Disp Refills    LOVASTATIN 40 MG Oral Tab [Pharmacy Med Name: Lovastatin 40 MG Oral Tablet] 90 tablet 0     Sig: TAKE 1 TABLET BY MOUTH ONCE DAILY AT BEDTIME        Cholesterol Medication Protocol Passed - 9/20/2022  3:40 PM        Passed - ALT in past 12 months        Passed - LDL in past 12 months        Passed - Last ALT < 80       Lab Results   Component Value Date    ALT 16 08/23/2022             Passed - Last LDL < 130     Lab Results   Component Value Date    LDL 52 08/23/2022               Passed - In person appointment or virtual visit in the past 12 mos or appointment in next 3 mos       Recent Outpatient Visits              3 weeks ago Acute bilateral low back pain without sciatica    [facility], [address], [facility], APRN    Office Visit    2 months ago Laceration of right knee, sequela    [facility], Yolanda Hall MD    Office Visit    3 months ago Ingrown toenail of right foot    [address] [facility] [place], [place] Rieger[place], [place]    Office Visit    4 months ago Pain in toes of both feet    [address] [facility] [address], [place] RiegerAgnieszka [facility]    Office Visit    4 months ago Essential hypertension with goal blood pressure less than 140/90    [address] [facility] [place], [facility]    Office Visit     Future Appointments         Provider Department Appt Notes    Tomorrow Roscoe Hartman, [address] [facility].  [address], [place] 3 mos nail care, policy informed    In 1 month Carlos Haines MD [facility] [facility], [address] annual well visit;   (policy informed, address given)  daughter Riaz Post made appt, will bring dad                  ALLOPURINOL 300 MG Oral Tab [Pharmacy Med Name: Allopurinol 300 MG Oral Tablet] 90 tablet 0     Sig: Take 1 tablet by mouth once daily        There is no refill protocol information for this order        TAMSULOSIN 0.4 MG Oral Cap [Pharmacy Med Name: Tamsulosin HCl 0.4 MG Oral Capsule] 90 capsule 0     Sig: Take 1 capsule by mouth once daily        Genitourinary Medications Passed - 9/20/2022  3:40 PM        Passed - Patient does not have pulmonary hypertension on problem list        Passed - In person appointment or virtual visit in the past 12 mos or appointment in next 3 mos       Recent Outpatient Visits              3 weeks ago Acute bilateral low back pain without sciatica    [address] [facility] Mery REGINA Guerrero    Office Visit    2 months ago Laceration of right knee, sequela    Anjelica Roman MD    Office Visit    3 months ago Ingrown toenail of right foot    [facility]. [facility] [place]    Office Visit    4 months ago Pain in toes of both feet    [facility]. [facility] [place] Mariaelena Escobar DPM    Office Visit    4 months ago Essential hypertension with goal blood pressure less than 140/90    [facility], [address] Ruiz MD    Office Visit     Future Appointments         Provider Department Appt Notes    Tomorrow Lakeishavishnu Rivers, [id number] [place] [address].  [address], [place] 3 mos nail care, policy informed    In 1 month Kymberly Gomez MD [facility], [facility];   (policy informed, address given)  daughter Yonatan Baltazar made appt, will bring dad                  POTASSIUM CHLORIDE ER 20 MEQ Oral Tab CR [Pharmacy Med Name: Potassium Chloride ER 20 MEQ Oral Tablet Extended Release] 90 tablet 0     Sig: TAKE 1 TABLET BY MOUTH ONCE DAILY IN THE MORNING        There is no refill protocol information for this order

## 2022-09-22 ENCOUNTER — OFFICE VISIT (OUTPATIENT)
Dept: PODIATRY CLINIC | Facility: CLINIC | Age: 87
End: 2022-09-22

## 2022-09-22 DIAGNOSIS — M79.674 PAIN IN TOES OF BOTH FEET: Primary | ICD-10-CM

## 2022-09-22 DIAGNOSIS — B35.1 ONYCHOMYCOSIS: ICD-10-CM

## 2022-09-22 DIAGNOSIS — M79.675 PAIN IN TOES OF BOTH FEET: Primary | ICD-10-CM

## 2022-09-22 PROCEDURE — 11721 DEBRIDE NAIL 6 OR MORE: CPT | Performed by: PODIATRIST

## 2022-09-22 PROCEDURE — 1126F AMNT PAIN NOTED NONE PRSNT: CPT | Performed by: PODIATRIST

## 2022-10-13 ENCOUNTER — APPOINTMENT (OUTPATIENT)
Dept: URBAN - METROPOLITAN AREA CLINIC 244 | Age: 87
Setting detail: DERMATOLOGY
End: 2022-10-16

## 2022-10-13 DIAGNOSIS — L82.1 OTHER SEBORRHEIC KERATOSIS: ICD-10-CM

## 2022-10-13 DIAGNOSIS — Z85.828 PERSONAL HISTORY OF OTHER MALIGNANT NEOPLASM OF SKIN: ICD-10-CM

## 2022-10-13 DIAGNOSIS — L81.4 OTHER MELANIN HYPERPIGMENTATION: ICD-10-CM

## 2022-10-13 DIAGNOSIS — D22 MELANOCYTIC NEVI: ICD-10-CM

## 2022-10-13 DIAGNOSIS — L57.0 ACTINIC KERATOSIS: ICD-10-CM

## 2022-10-13 PROBLEM — D22.62 MELANOCYTIC NEVI OF LEFT UPPER LIMB, INCLUDING SHOULDER: Status: ACTIVE | Noted: 2022-10-13

## 2022-10-13 PROBLEM — D22.5 MELANOCYTIC NEVI OF TRUNK: Status: ACTIVE | Noted: 2022-10-13

## 2022-10-13 PROBLEM — D22.61 MELANOCYTIC NEVI OF RIGHT UPPER LIMB, INCLUDING SHOULDER: Status: ACTIVE | Noted: 2022-10-13

## 2022-10-13 PROCEDURE — 17000 DESTRUCT PREMALG LESION: CPT

## 2022-10-13 PROCEDURE — 99213 OFFICE O/P EST LOW 20 MIN: CPT | Mod: 25

## 2022-10-13 PROCEDURE — 17003 DESTRUCT PREMALG LES 2-14: CPT

## 2022-10-13 PROCEDURE — OTHER COUNSELING: OTHER

## 2022-10-13 PROCEDURE — OTHER LIQUID NITROGEN: OTHER

## 2022-10-13 ASSESSMENT — LOCATION SIMPLE DESCRIPTION DERM
LOCATION SIMPLE: LEFT FOREARM
LOCATION SIMPLE: LEFT CHEEK
LOCATION SIMPLE: CHEST
LOCATION SIMPLE: RIGHT UPPER ARM
LOCATION SIMPLE: RIGHT EAR
LOCATION SIMPLE: POSTERIOR NECK
LOCATION SIMPLE: RIGHT TEMPLE
LOCATION SIMPLE: LEFT UPPER ARM

## 2022-10-13 ASSESSMENT — LOCATION DETAILED DESCRIPTION DERM
LOCATION DETAILED: RIGHT CYMBA CONCHA
LOCATION DETAILED: RIGHT ANTECUBITAL SKIN
LOCATION DETAILED: LEFT ANTECUBITAL SKIN
LOCATION DETAILED: RIGHT MEDIAL TEMPLE
LOCATION DETAILED: RIGHT LATERAL TRAPEZIAL NECK
LOCATION DETAILED: MIDDLE STERNUM
LOCATION DETAILED: LEFT DISTAL POSTERIOR UPPER ARM
LOCATION DETAILED: UPPER STERNUM
LOCATION DETAILED: RIGHT ANTERIOR DISTAL UPPER ARM
LOCATION DETAILED: LEFT ANTERIOR DISTAL UPPER ARM
LOCATION DETAILED: RIGHT MEDIAL SUPERIOR CHEST
LOCATION DETAILED: LEFT SUPERIOR CENTRAL MALAR CHEEK
LOCATION DETAILED: LEFT VENTRAL PROXIMAL FOREARM

## 2022-10-13 ASSESSMENT — LOCATION ZONE DERM
LOCATION ZONE: TRUNK
LOCATION ZONE: EAR
LOCATION ZONE: ARM
LOCATION ZONE: FACE
LOCATION ZONE: NECK

## 2022-10-13 NOTE — PROCEDURE: LIQUID NITROGEN
Detail Level: Zone
Render In Bullet Format When Appropriate: No
Post-Care Instructions: I reviewed with the patient in detail post-care instructions. Patient is to wear sunprotection, and avoid picking at any of the treated lesions. Pt may apply Vaseline to crusted or scabbing areas.
Duration Of Freeze Thaw-Cycle (Seconds): 0
Total Number Of Aks Treated: 3
Consent: The patient's consent was obtained including but not limited to risks of crusting, scabbing, blistering, scarring, darker or lighter pigmentary change, recurrence, incomplete removal and infection.

## 2022-10-27 ENCOUNTER — OFFICE VISIT (OUTPATIENT)
Facility: CLINIC | Age: 87
End: 2022-10-27
Payer: MEDICARE

## 2022-10-27 VITALS
SYSTOLIC BLOOD PRESSURE: 116 MMHG | WEIGHT: 173 LBS | HEIGHT: 71 IN | DIASTOLIC BLOOD PRESSURE: 60 MMHG | RESPIRATION RATE: 14 BRPM | OXYGEN SATURATION: 100 % | HEART RATE: 60 BPM | BODY MASS INDEX: 24.22 KG/M2

## 2022-10-27 DIAGNOSIS — K70.30 ALCOHOLIC CIRRHOSIS OF LIVER WITHOUT ASCITES (HCC): ICD-10-CM

## 2022-10-27 DIAGNOSIS — L57.0 ACTINIC KERATOSIS: ICD-10-CM

## 2022-10-27 DIAGNOSIS — K86.1 CHRONIC PANCREATITIS, UNSPECIFIED PANCREATITIS TYPE (HCC): ICD-10-CM

## 2022-10-27 DIAGNOSIS — M17.12 PRIMARY OSTEOARTHRITIS OF LEFT KNEE: ICD-10-CM

## 2022-10-27 DIAGNOSIS — M54.42 CHRONIC LEFT-SIDED LOW BACK PAIN WITH LEFT-SIDED SCIATICA: ICD-10-CM

## 2022-10-27 DIAGNOSIS — G89.29 CHRONIC LEFT-SIDED LOW BACK PAIN WITH LEFT-SIDED SCIATICA: ICD-10-CM

## 2022-10-27 DIAGNOSIS — M48.062 SPINAL STENOSIS OF LUMBAR REGION WITH NEUROGENIC CLAUDICATION: ICD-10-CM

## 2022-10-27 DIAGNOSIS — Z96.642 HISTORY OF TOTAL LEFT HIP REPLACEMENT: ICD-10-CM

## 2022-10-27 DIAGNOSIS — Z85.828 PERSONAL HISTORY OF SKIN CANCER: ICD-10-CM

## 2022-10-27 DIAGNOSIS — D48.5 NEOPLASM OF UNCERTAIN BEHAVIOR OF SKIN: ICD-10-CM

## 2022-10-27 DIAGNOSIS — E04.1 NONTOXIC UNINODULAR GOITER: ICD-10-CM

## 2022-10-27 DIAGNOSIS — H90.3 SENSORINEURAL HEARING LOSS, BILATERAL: ICD-10-CM

## 2022-10-27 DIAGNOSIS — K74.60 CIRRHOSIS OF LIVER WITHOUT ASCITES, UNSPECIFIED HEPATIC CIRRHOSIS TYPE (HCC): ICD-10-CM

## 2022-10-27 DIAGNOSIS — M16.11 PRIMARY OSTEOARTHRITIS OF RIGHT HIP: ICD-10-CM

## 2022-10-27 DIAGNOSIS — R10.11 RUQ PAIN: ICD-10-CM

## 2022-10-27 DIAGNOSIS — F01.50 VASCULAR DEMENTIA, UNSPECIFIED DEMENTIA SEVERITY, UNSPECIFIED WHETHER BEHAVIORAL, PSYCHOTIC, OR MOOD DISTURBANCE OR ANXIETY (HCC): ICD-10-CM

## 2022-10-27 DIAGNOSIS — D69.6 THROMBOCYTOPENIA (HCC): ICD-10-CM

## 2022-10-27 DIAGNOSIS — I25.119 ATHEROSCLEROSIS OF NATIVE CORONARY ARTERY OF NATIVE HEART WITH ANGINA PECTORIS (HCC): ICD-10-CM

## 2022-10-27 DIAGNOSIS — I10 ESSENTIAL HYPERTENSION WITH GOAL BLOOD PRESSURE LESS THAN 140/90: ICD-10-CM

## 2022-10-27 DIAGNOSIS — M11.20 CHONDROCALCINOSIS ARTICULARIS: ICD-10-CM

## 2022-10-27 DIAGNOSIS — C44.212 BASAL CELL CARCINOMA (BCC) OF HELIX OF RIGHT EAR: ICD-10-CM

## 2022-10-27 DIAGNOSIS — C44.219 BASAL CELL CARCINOMA (BCC) OF AURICLE OF LEFT EAR: ICD-10-CM

## 2022-10-27 DIAGNOSIS — N40.0 BENIGN PROSTATIC HYPERPLASIA WITHOUT LOWER URINARY TRACT SYMPTOMS: ICD-10-CM

## 2022-10-27 DIAGNOSIS — I35.0 NONRHEUMATIC AORTIC VALVE STENOSIS: Primary | ICD-10-CM

## 2022-10-27 DIAGNOSIS — E53.8 VITAMIN B12 DEFICIENCY: ICD-10-CM

## 2022-10-27 DIAGNOSIS — I48.20 CHRONIC ATRIAL FIBRILLATION (HCC): ICD-10-CM

## 2022-10-27 DIAGNOSIS — E78.00 HYPERCHOLESTEROLEMIA: ICD-10-CM

## 2022-10-27 PROBLEM — R09.89 CAROTID BRUIT: Status: RESOLVED | Noted: 2019-04-16 | Resolved: 2022-10-27

## 2022-10-27 PROBLEM — M54.50 ACUTE BILATERAL LOW BACK PAIN WITHOUT SCIATICA: Status: RESOLVED | Noted: 2022-08-25 | Resolved: 2022-10-27

## 2022-10-27 PROBLEM — J69.0 ASPIRATION PNEUMONIA (HCC): Status: RESOLVED | Noted: 2020-11-11 | Resolved: 2022-10-27

## 2022-10-27 PROBLEM — K85.90 ACUTE ON CHRONIC PANCREATITIS (HCC): Status: RESOLVED | Noted: 2021-09-21 | Resolved: 2022-10-27

## 2022-10-27 PROBLEM — M25.062 HEMARTHROSIS OF LEFT KNEE: Status: RESOLVED | Noted: 2019-06-04 | Resolved: 2022-10-27

## 2022-11-02 RX ORDER — OMEPRAZOLE 20 MG/1
CAPSULE, DELAYED RELEASE ORAL
Qty: 90 CAPSULE | Refills: 1 | Status: SHIPPED | OUTPATIENT
Start: 2022-11-02

## 2022-11-19 ENCOUNTER — PATIENT MESSAGE (OUTPATIENT)
Dept: INTERNAL MEDICINE CLINIC | Facility: CLINIC | Age: 87
End: 2022-11-19

## 2022-11-19 DIAGNOSIS — H91.90 HEARING LOSS, UNSPECIFIED HEARING LOSS TYPE, UNSPECIFIED LATERALITY: Primary | ICD-10-CM

## 2022-11-20 NOTE — TELEPHONE ENCOUNTER
Dr. Italo Gomez, please see pended referral for external audiologist dx : hearing loss unspecified. Last seen in office 10/27/22. From: Paulie Dyson  To: Wilfredo Brooks MD  Sent: 11/19/2022 11:23 PM CST  Subject: Audiology Referral    Hello, it is time for my dad's hearing exam and would very much appreciate a referral/order that may allow for some of the expenses to be submitted to medicare. His group is [facility], [address] [place] [address], [address] [place] [address]. [phone]. They appreciate an order faxed to [phone], or if attached to this my chart I can get it to them. Thank you Leighann Walters, youngest daughter.

## 2022-11-21 NOTE — TELEPHONE ENCOUNTER
Dr Yamileth Fernandes message sent to pt via [employer].     Please reply to destiny: EM RN Parley Nageotte

## 2022-12-05 ENCOUNTER — PATIENT MESSAGE (OUTPATIENT)
Dept: INTERNAL MEDICINE CLINIC | Facility: CLINIC | Age: 87
End: 2022-12-05

## 2022-12-05 ENCOUNTER — TELEPHONE (OUTPATIENT)
Dept: INTERNAL MEDICINE CLINIC | Facility: CLINIC | Age: 87
End: 2022-12-05

## 2022-12-05 ENCOUNTER — TELEMEDICINE (OUTPATIENT)
Dept: INTERNAL MEDICINE CLINIC | Facility: CLINIC | Age: 87
End: 2022-12-05
Payer: MEDICARE

## 2022-12-05 DIAGNOSIS — J06.9 VIRAL UPPER RESPIRATORY TRACT INFECTION: Primary | ICD-10-CM

## 2022-12-05 PROCEDURE — 99213 OFFICE O/P EST LOW 20 MIN: CPT | Performed by: NURSE PRACTITIONER

## 2022-12-05 RX ORDER — BENZONATATE 100 MG/1
100 CAPSULE ORAL 3 TIMES DAILY PRN
Qty: 20 CAPSULE | Refills: 0 | Status: SHIPPED | OUTPATIENT
Start: 2022-12-05 | End: 2022-12-12

## 2022-12-05 RX ORDER — ALBUTEROL SULFATE 90 UG/1
2 AEROSOL, METERED RESPIRATORY (INHALATION) EVERY 6 HOURS PRN
Qty: 1 EACH | Refills: 0 | Status: SHIPPED | OUTPATIENT
Start: 2022-12-05

## 2022-12-06 ENCOUNTER — TELEPHONE (OUTPATIENT)
Dept: INTERNAL MEDICINE CLINIC | Facility: CLINIC | Age: 87
End: 2022-12-06

## 2022-12-06 RX ORDER — AZITHROMYCIN 250 MG/1
TABLET, FILM COATED ORAL
Qty: 6 TABLET | Refills: 0 | Status: SHIPPED | OUTPATIENT
Start: 2022-12-06 | End: 2022-12-11

## 2022-12-06 NOTE — TELEPHONE ENCOUNTER
See condition update 12/5/22. Giovanna Maynard RN 12/5/2022  8:59 PM CST        ----- Message -----  From: Aga Lane  Sent: 12/5/2022   4:00 PM CST  To: Em Rn Triage  Subject: additional symptoms                              Hi. in the afternoon. My dad is feeling a raw/sore throat and is fatigued/weak for walking in the house. Thought I should share. Could this raw throat be from post nasal drip and all the coughing? [facility] is still processing the scripts you sent in (so grateful!). Fever is hanging about 99. Thank you if you have any other suggestions.   Chika Sandoval (youngest daughter)

## 2022-12-06 NOTE — TELEPHONE ENCOUNTER
Patient called and spoke to his daughter. He is still complaining of a sore throat. He did not tolerate the benzonatate 100mg last evening. It made if confused and restless. Binh Sonia will be ordered for his throat pain.     ROSANNA Noble  Working with Kenisha Hagen

## 2022-12-06 NOTE — TELEPHONE ENCOUNTER
Chikis Walters =see message and advice, thanks. Had telemedicine 12/5/22.              ----- Message from Lisa Young RN sent at 12/5/2022  8:59 PM CST -----  Regarding: FW: additional symptoms      ----- Message -----  From: Michelle Rivers  Sent: 12/5/2022   4:00 PM CST  To: Em Rn Triage  Subject: additional symptoms                              Hi. in the afternoon. My dad is feeling a raw/sore throat and is fatigued/weak for walking in the house. Thought I should share. Could this raw throat be from post nasal drip and all the coughing? [employer] is still processing the scripts you sent in (so grateful!). Fever is hanging about 99. Thank you if you have any other suggestions.   Tai Matamorosurray (youngest daughter)

## 2022-12-08 ENCOUNTER — APPOINTMENT (OUTPATIENT)
Dept: GENERAL RADIOLOGY | Age: 87
End: 2022-12-08
Attending: EMERGENCY MEDICINE
Payer: MEDICARE

## 2022-12-08 ENCOUNTER — TELEPHONE (OUTPATIENT)
Dept: INTERNAL MEDICINE CLINIC | Facility: CLINIC | Age: 87
End: 2022-12-08

## 2022-12-08 ENCOUNTER — HOSPITAL ENCOUNTER (OUTPATIENT)
Age: 87
Discharge: HOME | End: 2022-12-08
Attending: EMERGENCY MEDICINE
Payer: MEDICARE

## 2022-12-08 ENCOUNTER — LAB ENCOUNTER (OUTPATIENT)
Dept: LAB | Age: 87
End: 2022-12-08
Attending: INTERNAL MEDICINE
Payer: MEDICARE

## 2022-12-08 VITALS
DIASTOLIC BLOOD PRESSURE: 81 MMHG | SYSTOLIC BLOOD PRESSURE: 123 MMHG | TEMPERATURE: 98 F | RESPIRATION RATE: 18 BRPM | HEART RATE: 65 BPM | OXYGEN SATURATION: 100 %

## 2022-12-08 DIAGNOSIS — J11.1 INFLUENZA: Primary | ICD-10-CM

## 2022-12-08 DIAGNOSIS — J18.9 COMMUNITY ACQUIRED PNEUMONIA, UNSPECIFIED LATERALITY: ICD-10-CM

## 2022-12-08 LAB
BILIRUB UR QL: NEGATIVE
CLARITY UR: CLEAR
COLOR UR: YELLOW
GLUCOSE UR-MCNC: NEGATIVE MG/DL
HGB UR QL STRIP.AUTO: NEGATIVE
KETONES UR-MCNC: NEGATIVE MG/DL
LEUKOCYTE ESTERASE UR QL STRIP.AUTO: NEGATIVE
NITRITE UR QL STRIP.AUTO: NEGATIVE
PH UR: 7 [PH] (ref 5–8)
POCT INFLUENZA A: POSITIVE
POCT INFLUENZA B: NEGATIVE
PROT UR-MCNC: NEGATIVE MG/DL
SP GR UR STRIP: 1.01 (ref 1–1.03)
UROBILINOGEN UR STRIP-ACNC: 1

## 2022-12-08 PROCEDURE — 99214 OFFICE O/P EST MOD 30 MIN: CPT

## 2022-12-08 PROCEDURE — 87502 INFLUENZA DNA AMP PROBE: CPT | Performed by: EMERGENCY MEDICINE

## 2022-12-08 PROCEDURE — 71046 X-RAY EXAM CHEST 2 VIEWS: CPT | Performed by: EMERGENCY MEDICINE

## 2022-12-08 RX ORDER — AMOXICILLIN AND CLAVULANATE POTASSIUM 875; 125 MG/1; MG/1
1 TABLET, FILM COATED ORAL 2 TIMES DAILY
Qty: 14 TABLET | Refills: 0 | Status: SHIPPED | OUTPATIENT
Start: 2022-12-08 | End: 2022-12-15

## 2022-12-08 RX ORDER — AMOXICILLIN AND CLAVULANATE POTASSIUM 875; 125 MG/1; MG/1
1 TABLET, FILM COATED ORAL 2 TIMES DAILY
Qty: 14 TABLET | Refills: 0 | Status: SHIPPED | OUTPATIENT
Start: 2022-12-08 | End: 2022-12-08

## 2022-12-08 NOTE — TELEPHONE ENCOUNTER
I received a page from the daughter last night that patient had televisit because he was not feeling well. He had a sore throat and cough expectoration prescribed her Z-Carlos. According to the daughter he takes 1 pill but she was concerned because of the interaction with other medications. According to the daughter patient was lethargic sleeping all day long feeling very weak and tired I did explain to her that if he is lethargic at that same sign of infection even though he does not have any fever I did recommend her to take to emergency room patient's daughter insisted on getting urine checked because she thinks that that might be the reason for his lethargy.   I did explain to her that I will put the order for UA but he needs to be examinedand needs to go to er

## 2022-12-08 NOTE — ED INITIAL ASSESSMENT (HPI)
PATIENT ARRIVED PER WHEELCHAIR TO ROOM WITH DAUGHTER. SYMPTOMS STARTED 5 DAYS AGO. +FEVERS +COUGH NO NASAL CONGESTION. DAUGHTER STATES A ZPAK WAS PRESCRIBED BY PCP. AFTER THE FIRST DOSE YESTERDAY PATIENT WAS VERY LETHARGIC AND FATIGUED. CONTINUING TO COUGH.

## 2022-12-08 NOTE — DISCHARGE INSTRUCTIONS
Take the antibiotic prescribed to you today as directed. Make sure to finish the entire course even if you start to feel better. Push fluids and get rest.    See primary care for any follow-up concerns. Go to the ER if you develop worsening symptoms, difficulty breathing, inability to tolerate fluids, or any emergent concerns.

## 2022-12-30 ENCOUNTER — HOSPITAL ENCOUNTER (OUTPATIENT)
Age: 87
Discharge: HOME | End: 2022-12-30
Attending: EMERGENCY MEDICINE
Payer: MEDICARE

## 2022-12-30 ENCOUNTER — APPOINTMENT (OUTPATIENT)
Dept: GENERAL RADIOLOGY | Age: 87
End: 2022-12-30
Attending: EMERGENCY MEDICINE
Payer: MEDICARE

## 2022-12-30 ENCOUNTER — LAB ENCOUNTER (OUTPATIENT)
Dept: LAB | Age: 87
End: 2022-12-30
Attending: NURSE PRACTITIONER
Payer: MEDICARE

## 2022-12-30 VITALS
DIASTOLIC BLOOD PRESSURE: 45 MMHG | SYSTOLIC BLOOD PRESSURE: 129 MMHG | OXYGEN SATURATION: 96 % | TEMPERATURE: 98 F | HEART RATE: 54 BPM | RESPIRATION RATE: 20 BRPM

## 2022-12-30 DIAGNOSIS — R55 SYNCOPE AND COLLAPSE: ICD-10-CM

## 2022-12-30 DIAGNOSIS — I25.10 CORONARY ATHEROSCLEROSIS OF NATIVE CORONARY ARTERY: ICD-10-CM
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
## 2022-12-30 DIAGNOSIS — R60.0 LOCALIZED EDEMA: Primary | ICD-10-CM
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
## 2022-12-30 DIAGNOSIS — R55 SYNCOPE AND COLLAPSE: Primary | ICD-10-CM

## 2022-12-30 DIAGNOSIS — I48.19 PERSISTENT ATRIAL FIBRILLATION (HCC): ICD-10-CM

## 2022-12-30 DIAGNOSIS — S30.0XXA LUMBAR CONTUSION, INITIAL ENCOUNTER: ICD-10-CM

## 2022-12-30 LAB
ALBUMIN SERPL-MCNC: 2.9 G/DL (ref 3.4–5)
ALBUMIN/GLOB SERPL: 0.7 {RATIO} (ref 1–2)
ALP LIVER SERPL-CCNC: 107 U/L
ALT SERPL-CCNC: 18 U/L
ANION GAP SERPL CALC-SCNC: 6 MMOL/L (ref 0–18)
AST SERPL-CCNC: 25 U/L (ref 15–37)
BASOPHILS # BLD AUTO: 0.03 X10(3) UL (ref 0–0.2)
BASOPHILS NFR BLD AUTO: 0.5 %
BILIRUB SERPL-MCNC: 0.8 MG/DL (ref 0.1–2)
BUN BLD-MCNC: 25 MG/DL (ref 7–18)
BUN/CREAT SERPL: 19.7 (ref 10–20)
CALCIUM BLD-MCNC: 9 MG/DL (ref 8.5–10.1)
CHLORIDE SERPL-SCNC: 102 MMOL/L (ref 98–112)
CO2 SERPL-SCNC: 29 MMOL/L (ref 21–32)
CREAT BLD-MCNC: 1.27 MG/DL
DEPRECATED RDW RBC AUTO: 55.4 FL (ref 35.1–46.3)
EOSINOPHIL # BLD AUTO: 0.25 X10(3) UL (ref 0–0.7)
EOSINOPHIL NFR BLD AUTO: 3.8 %
ERYTHROCYTE [DISTWIDTH] IN BLOOD BY AUTOMATED COUNT: 14.9 % (ref 11–15)
FASTING STATUS PATIENT QL REPORTED: NO
GFR SERPLBLD BASED ON 1.73 SQ M-ARVRAT: 52 ML/MIN/1.73M2 (ref 60–?)
GLOBULIN PLAS-MCNC: 4.4 G/DL (ref 2.8–4.4)
GLUCOSE BLD-MCNC: 134 MG/DL (ref 70–99)
HCT VFR BLD AUTO: 40.2 %
HGB BLD-MCNC: 13.1 G/DL
IMM GRANULOCYTES # BLD AUTO: 0.02 X10(3) UL (ref 0–1)
IMM GRANULOCYTES NFR BLD: 0.3 %
LYMPHOCYTES # BLD AUTO: 1.24 X10(3) UL (ref 1–4)
LYMPHOCYTES NFR BLD AUTO: 19 %
MAGNESIUM SERPL-MCNC: 2 MG/DL (ref 1.6–2.6)
MCH RBC QN AUTO: 32.8 PG (ref 26–34)
MCHC RBC AUTO-ENTMCNC: 32.6 G/DL (ref 31–37)
MCV RBC AUTO: 100.8 FL
MONOCYTES # BLD AUTO: 0.69 X10(3) UL (ref 0.1–1)
MONOCYTES NFR BLD AUTO: 10.6 %
NEUTROPHILS # BLD AUTO: 4.31 X10 (3) UL (ref 1.5–7.7)
NEUTROPHILS # BLD AUTO: 4.31 X10(3) UL (ref 1.5–7.7)
NEUTROPHILS NFR BLD AUTO: 65.8 %
OSMOLALITY SERPL CALC.SUM OF ELEC: 290 MOSM/KG (ref 275–295)
PLATELET # BLD AUTO: 121 10(3)UL (ref 150–450)
POTASSIUM SERPL-SCNC: 4.2 MMOL/L (ref 3.5–5.1)
PROT SERPL-MCNC: 7.3 G/DL (ref 6.4–8.2)
RBC # BLD AUTO: 3.99 X10(6)UL
SODIUM SERPL-SCNC: 137 MMOL/L (ref 136–145)
WBC # BLD AUTO: 6.5 X10(3) UL (ref 4–11)

## 2022-12-30 PROCEDURE — 72100 X-RAY EXAM L-S SPINE 2/3 VWS: CPT | Performed by: EMERGENCY MEDICINE

## 2022-12-30 PROCEDURE — 80053 COMPREHEN METABOLIC PANEL: CPT

## 2022-12-30 PROCEDURE — 83735 ASSAY OF MAGNESIUM: CPT

## 2022-12-30 PROCEDURE — 99213 OFFICE O/P EST LOW 20 MIN: CPT

## 2022-12-30 PROCEDURE — 99214 OFFICE O/P EST MOD 30 MIN: CPT

## 2022-12-30 PROCEDURE — 72220 X-RAY EXAM SACRUM TAILBONE: CPT | Performed by: EMERGENCY MEDICINE

## 2022-12-30 PROCEDURE — 36415 COLL VENOUS BLD VENIPUNCTURE: CPT

## 2022-12-30 PROCEDURE — 85025 COMPLETE CBC W/AUTO DIFF WBC: CPT

## 2022-12-30 RX ORDER — LIDOCAINE 4 G/G
1 PATCH TOPICAL EVERY 24 HOURS
Qty: 10 PATCH | Refills: 0 | Status: SHIPPED | OUTPATIENT
Start: 2022-12-30

## 2022-12-30 NOTE — ED INITIAL ASSESSMENT (HPI)
Had a near syncopal episode with hr to low 40's per family on dec 23, has been c/o acute on chronic low back pain, increased weakness and gait instability, no cp ,no sob

## 2023-01-23 NOTE — TELEPHONE ENCOUNTER
Refill passed per [facility] protocol.      Requested Prescriptions   Pending Prescriptions Disp Refills    OMEPRAZOLE 20 MG Oral Capsule Delayed Release [Pharmacy Med Name: Omeprazole 20 MG Oral Capsule Delayed Release] 90 capsule 0     Sig: TAKE 1 CAPSULE BY MOUTH ONCE DAILY IN THE MORNING BEFORE BREAKFAST        Gastrointestional Medication Protocol Passed - 5/16/2022 10:28 AM        Passed - Appointment in past 15 or next 3 months              @[facility]@      @[facility] Detail Level: Detailed Number Of Curettages: 3 Size Of Lesion After Curettage: 0 Add Intralesional Injection: No Total Volume (Ccs): 1 Anesthesia Type: 0.5% lidocaine without epinephrine Cautery Type: electrodesiccation What Was Performed First?: Destruction Final Size Statement: The size of the lesion after curettage was Additional Information: (Optional): The wound was cleaned, and a pressure dressing was applied.  The patient received detailed post-op instructions. Consent was obtained from the patient. The risks, benefits and alternatives to therapy were discussed in detail. Specifically, the risks of infection, scarring, bleeding, prolonged wound healing, nerve injury, incomplete removal, allergy to anesthesia and recurrence were addressed. Alternatives to ED&C, such as: surgical removal and XRT were also discussed.  Prior to the procedure, the treatment site was clearly identified and confirmed by the patient. All components of Universal Protocol/PAUSE Rule completed. Post-Care Instructions: I reviewed with the patient in detail post-care instructions. Patient is to keep the area dry for 48 hours, and not to engage in any swimming until the area is healed. Should the patient develop any fevers, chills, bleeding, severe pain patient will contact the office immediately. Bill As A Line Item Or As Units: Line Item

## 2023-01-31 ENCOUNTER — OFFICE VISIT (OUTPATIENT)
Dept: PODIATRY CLINIC | Facility: CLINIC | Age: 88
End: 2023-01-31

## 2023-01-31 DIAGNOSIS — R26.81 GAIT INSTABILITY: ICD-10-CM

## 2023-01-31 DIAGNOSIS — B35.1 ONYCHOMYCOSIS: Primary | ICD-10-CM

## 2023-01-31 PROCEDURE — 1126F AMNT PAIN NOTED NONE PRSNT: CPT | Performed by: PODIATRIST

## 2023-01-31 PROCEDURE — 99213 OFFICE O/P EST LOW 20 MIN: CPT | Performed by: PODIATRIST

## 2023-02-06 ENCOUNTER — MED REC SCAN ONLY (OUTPATIENT)
Dept: INTERNAL MEDICINE CLINIC | Facility: CLINIC | Age: 88
End: 2023-02-06

## 2023-02-08 ENCOUNTER — OFFICE VISIT (OUTPATIENT)
Dept: INTERNAL MEDICINE CLINIC | Facility: CLINIC | Age: 88
End: 2023-02-08

## 2023-02-08 VITALS
SYSTOLIC BLOOD PRESSURE: 118 MMHG | DIASTOLIC BLOOD PRESSURE: 70 MMHG | WEIGHT: 173 LBS | OXYGEN SATURATION: 99 % | BODY MASS INDEX: 24.22 KG/M2 | HEIGHT: 71 IN | HEART RATE: 58 BPM | RESPIRATION RATE: 14 BRPM

## 2023-02-08 DIAGNOSIS — M10.9 GOUT, UNSPECIFIED CAUSE, UNSPECIFIED CHRONICITY, UNSPECIFIED SITE: ICD-10-CM

## 2023-02-08 DIAGNOSIS — I48.20 CHRONIC ATRIAL FIBRILLATION (HCC): ICD-10-CM

## 2023-02-08 DIAGNOSIS — I10 ESSENTIAL HYPERTENSION WITH GOAL BLOOD PRESSURE LESS THAN 140/90: Primary | ICD-10-CM

## 2023-02-08 PROBLEM — J06.9 VIRAL UPPER RESPIRATORY TRACT INFECTION: Status: RESOLVED | Noted: 2022-12-05 | Resolved: 2023-02-08

## 2023-02-08 PROCEDURE — 99214 OFFICE O/P EST MOD 30 MIN: CPT | Performed by: INTERNAL MEDICINE

## 2023-02-08 PROCEDURE — 1126F AMNT PAIN NOTED NONE PRSNT: CPT | Performed by: INTERNAL MEDICINE

## 2023-03-22 DIAGNOSIS — E87.6 HYPOKALEMIA: ICD-10-CM

## 2023-03-22 RX ORDER — LOVASTATIN 40 MG/1
40 TABLET ORAL NIGHTLY
Qty: 90 TABLET | Refills: 3 | Status: SHIPPED | OUTPATIENT
Start: 2023-03-22

## 2023-03-22 NOTE — TELEPHONE ENCOUNTER
Refill passed per [place] [employer] [facility], [facility] protocol.       Requested Prescriptions   Pending Prescriptions Disp Refills    LOVASTATIN 40 MG Oral Tab [Pharmacy Med Name: Lovastatin 40 MG Oral Tablet] 90 tablet 0     Sig: Take 1 tablet by mouth nightly       Cholesterol Medication Protocol Passed - 3/22/2023  9:31 AM        Passed - ALT in past 12 months        Passed - LDL in past 12 months        Passed - Last ALT < 80     Lab Results   Component Value Date    ALT 18 12/30/2022             Passed - Last LDL < 130     Lab Results   Component Value Date    LDL 52 08/23/2022             Passed - In person appointment or virtual visit in the past 12 mos or appointment in next 3 mos     Recent Outpatient Visits              1 month ago Essential hypertension with goal blood pressure less than 140/90    Nunu Galvan, Yolanda Hall MD    Office Visit    1 month ago Onychomycosis    [facility], [address], [place], Glennda Longest, [place]    Office Visit    3 months ago Viral upper respiratory tract infection    [facility], [address] [place] [address], [facility]    Telemedicine    4 months ago Nonrheumatic aortic valve stenosis    [facility], [address] [facility], Yolanda Hall MD    Office Visit    6 months ago Pain in toes of both feet    [address], [address], [place] Agnieszka Escobar DPM    Office Visit          Future Appointments         Provider Department Appt Notes    In 1 month Judson Waldron, [address], [address], [place] 3 month fu    In 2 months Carlos Haines MD [address], [address] [facility] 4 month f/u                     Future Appointments         Provider Department Appt Notes    In 1 month Judson Waldron, [address], [address], [place] 3 month fu    In 2 months Carlos Haines MD [address] 4 month f/u            Recent Outpatient Visits              1 month ago Essential hypertension with goal blood pressure less than 140/90    [address] [facility], Leah Briceño MD    Office Visit    1 month ago Onychomycosis    [facility], [address], [place], Reeta Dane, [place]    Office Visit    3 months ago Viral upper respiratory tract infection    [facility], [address] [place] [address], APRN    Telemedicine    4 months ago Nonrheumatic aortic valve stenosis    Leah Moffett MD    Office Visit    6 months ago Pain in toes of both feet    [facility], [address], [place], [place], [place]    Office Visit

## 2023-03-23 RX ORDER — POTASSIUM CHLORIDE 1500 MG/1
1 TABLET, FILM COATED, EXTENDED RELEASE ORAL EVERY MORNING
Qty: 90 TABLET | Refills: 3 | Status: SHIPPED | OUTPATIENT
Start: 2023-03-23

## 2023-03-23 RX ORDER — TAMSULOSIN HYDROCHLORIDE 0.4 MG/1
0.4 CAPSULE ORAL DAILY
Qty: 90 CAPSULE | Refills: 3 | Status: SHIPPED | OUTPATIENT
Start: 2023-03-23

## 2023-03-23 RX ORDER — ALLOPURINOL 300 MG/1
300 TABLET ORAL DAILY
Qty: 90 TABLET | Refills: 3 | Status: SHIPPED | OUTPATIENT
Start: 2023-03-23

## 2023-03-23 NOTE — TELEPHONE ENCOUNTER
Please review. Protocol failed / No protocol. Requested Prescriptions   Pending Prescriptions Disp Refills    Potassium Chloride ER 20 MEQ Oral Tab CR [Pharmacy Med Name: Potassium Chloride ER 20 MEQ Oral Tablet Extended Release] 90 tablet 3     Sig: Take 1 tablet by mouth every morning. There is no refill protocol information for this order       allopurinol 300 MG Oral Tab [Pharmacy Med Name: Allopurinol 300 MG Oral Tablet] 90 tablet 3     Sig: Take 1 tablet (300 mg total) by mouth daily. There is no refill protocol information for this order      Signed Prescriptions Disp Refills    tamsulosin 0.4 MG Oral Cap 90 capsule 3     Sig: Take 1 capsule (0.4 mg total) by mouth daily.        Genitourinary Medications Passed - 3/22/2023  6:20 PM        Passed - Patient does not have pulmonary hypertension on problem list        Passed - In person appointment or virtual visit in the past 12 mos or appointment in next 3 mos     Recent Outpatient Visits              1 month ago Essential hypertension with goal blood pressure less than 140/90    Shira Resendez MD    Office Visit    1 month ago Onychomycosis    [facility], [address], [place], Dudley Fraise, [place]    Office Visit    3 months ago Viral upper respiratory tract infection    [facility], Krys Dougherty Kasey Louder, APRN    Telemedicine    4 months ago Nonrheumatic aortic valve stenosis    [facility], [address] [place] [address], Shira Ceballos MD    Office Visit    6 months ago Pain in toes of both feet    [facility], [address], [place] Louis Escobar DPM    Office Visit          Future Appointments         Provider Department Appt Notes    In 1 month Lemuel Washburn, [address], [address], [place] 3 month fu    In 2 months Josee Dai MD [facility], [facility] 4 month f/u                    Recent Outpatient Visits              1 month ago Essential hypertension with goal blood pressure less than 140/90    [facility], [address] [place] [address], Usha Casillas MD    Office Visit    1 month ago Onychomycosis    [facility], [address], [place] Rafy Sim [place]    Office Visit    3 months ago Viral upper respiratory tract infection    [facility], [address] [facility], [place], APRN    Telemedicine    4 months ago Nonrheumatic aortic valve stenosis    [facility], [address] [facility] [place], Usha Casillas MD    Office Visit    6 months ago Pain in toes of both feet    [facility], [address], [place] Edward Escobar June, DPM    Office Visit            Future Appointments         Provider Department Appt Notes    In 1 month Celeste Hammond DPM [address], [address], [place] 3 month fu    In 2 months Candido Samano MD [address], [address] [facility] 4 month f/u

## 2023-03-23 NOTE — TELEPHONE ENCOUNTER
Refill passed per [place] [employer], [facility] protocol.     .  Requested Prescriptions   Pending Prescriptions Disp Refills    POTASSIUM CHLORIDE ER 20 MEQ Oral Tab CR [Pharmacy Med Name: Potassium Chloride ER 20 MEQ Oral Tablet Extended Release] 90 tablet 0     Sig: TAKE 1 TABLET BY MOUTH ONCE DAILY IN THE MORNING       There is no refill protocol information for this order       ALLOPURINOL 300 MG Oral Tab [Pharmacy Med Name: Allopurinol 300 MG Oral Tablet] 90 tablet 0     Sig: Take 1 tablet by mouth once daily       There is no refill protocol information for this order       TAMSULOSIN 0.4 MG Oral Cap [Pharmacy Med Name: Tamsulosin HCl 0.4 MG Oral Capsule] 90 capsule 0     Sig: Take 1 capsule by mouth once daily       Genitourinary Medications Passed - 3/22/2023  6:20 PM        Passed - Patient does not have pulmonary hypertension on problem list        Passed - In person appointment or virtual visit in the past 12 mos or appointment in next 3 mos     Recent Outpatient Visits              1 month ago Essential hypertension with goal blood pressure less than 140/90    [facility], [address] [place] [address] Carlos Cruz MD    Office Visit    1 month ago Onychomycosis    [facility], [address], [place], Eller Nissen, [place]    Office Visit    3 months ago Viral upper respiratory tract infection    [facility], [address] [place], [place] [address], APRN    Telemedicine    4 months ago Nonrheumatic aortic valve stenosis    [facility], [address] [facility], Carlos Cruz MD    Office Visit    6 months ago Pain in toes of both feet    [facility], [address], [place] Sonia Escobar DPM    Office Visit          Future Appointments         Provider Department Appt Notes    In 1 month Meshulam, Eller Nissen, [address] [place] [address], [address], [place] 3 month fu    In 2 months Rupert Mendosa MD [facility] 4 month f/u                    Recent Outpatient Visits              1 month ago Essential hypertension with goal blood pressure less than 140/90    [facility], [address] [place], [place] [address]Levi MD    Office Visit    1 month ago Onychomycosis    [facility], [address], [place] M[place]ul[place], [place]    Office Visit    3 months ago Viral upper respiratory tract infection    [facility], [address] [place], [place] [address]G[place] APRN    Telemedicine    4 months ago Nonrheumatic aortic valve stenosis    [facility], [address] [facility], Levi Wise MD    Office Visit    6 months ago Pain in toes of both feet    [facility], [address], [place] Raiza Escobar DPM    Office Visit            Future Appointments         Provider Department Appt Notes    In 1 month Bib Rain DPM [address], [address], [place] 3 month fu    In 2 months Chris Joshi MD [address], [address] [facility] 4 month f/u

## 2023-04-20 ENCOUNTER — APPOINTMENT (OUTPATIENT)
Dept: URBAN - METROPOLITAN AREA CLINIC 244 | Age: 88
Setting detail: DERMATOLOGY
End: 2023-04-23

## 2023-04-20 DIAGNOSIS — D22 MELANOCYTIC NEVI: ICD-10-CM

## 2023-04-20 DIAGNOSIS — Z85.828 PERSONAL HISTORY OF OTHER MALIGNANT NEOPLASM OF SKIN: ICD-10-CM

## 2023-04-20 DIAGNOSIS — L57.0 ACTINIC KERATOSIS: ICD-10-CM

## 2023-04-20 DIAGNOSIS — L82.1 OTHER SEBORRHEIC KERATOSIS: ICD-10-CM

## 2023-04-20 DIAGNOSIS — L81.4 OTHER MELANIN HYPERPIGMENTATION: ICD-10-CM

## 2023-04-20 PROBLEM — D22.5 MELANOCYTIC NEVI OF TRUNK: Status: ACTIVE | Noted: 2023-04-20

## 2023-04-20 PROBLEM — D22.62 MELANOCYTIC NEVI OF LEFT UPPER LIMB, INCLUDING SHOULDER: Status: ACTIVE | Noted: 2023-04-20

## 2023-04-20 PROBLEM — D22.61 MELANOCYTIC NEVI OF RIGHT UPPER LIMB, INCLUDING SHOULDER: Status: ACTIVE | Noted: 2023-04-20

## 2023-04-20 PROCEDURE — OTHER COUNSELING: OTHER

## 2023-04-20 PROCEDURE — OTHER LIQUID NITROGEN: OTHER

## 2023-04-20 PROCEDURE — 17000 DESTRUCT PREMALG LESION: CPT

## 2023-04-20 PROCEDURE — 99213 OFFICE O/P EST LOW 20 MIN: CPT | Mod: 25

## 2023-04-20 ASSESSMENT — LOCATION DETAILED DESCRIPTION DERM
LOCATION DETAILED: LEFT ANTERIOR DISTAL UPPER ARM
LOCATION DETAILED: UPPER STERNUM
LOCATION DETAILED: RIGHT ANTERIOR DISTAL UPPER ARM
LOCATION DETAILED: RIGHT ANTECUBITAL SKIN
LOCATION DETAILED: LEFT ANTECUBITAL SKIN
LOCATION DETAILED: LEFT DISTAL POSTERIOR UPPER ARM
LOCATION DETAILED: LEFT INFERIOR CENTRAL MALAR CHEEK
LOCATION DETAILED: RIGHT MEDIAL TEMPLE
LOCATION DETAILED: RIGHT CYMBA CONCHA
LOCATION DETAILED: MIDDLE STERNUM
LOCATION DETAILED: RIGHT MEDIAL SUPERIOR CHEST
LOCATION DETAILED: LEFT VENTRAL PROXIMAL FOREARM

## 2023-04-20 ASSESSMENT — LOCATION SIMPLE DESCRIPTION DERM
LOCATION SIMPLE: CHEST
LOCATION SIMPLE: RIGHT EAR
LOCATION SIMPLE: LEFT FOREARM
LOCATION SIMPLE: LEFT UPPER ARM
LOCATION SIMPLE: RIGHT UPPER ARM
LOCATION SIMPLE: LEFT CHEEK
LOCATION SIMPLE: RIGHT TEMPLE

## 2023-04-20 ASSESSMENT — LOCATION ZONE DERM
LOCATION ZONE: ARM
LOCATION ZONE: EAR
LOCATION ZONE: TRUNK
LOCATION ZONE: FACE

## 2023-04-20 NOTE — PROCEDURE: LIQUID NITROGEN
Render In Bullet Format When Appropriate: No
Post-Care Instructions: I reviewed with the patient in detail post-care instructions. Patient is to wear sunprotection, and avoid picking at any of the treated lesions. Pt may apply Vaseline to crusted or scabbing areas.
Total Number Of Aks Treated: 1
Consent: The patient's consent was obtained including but not limited to risks of crusting, scabbing, blistering, scarring, darker or lighter pigmentary change, recurrence, incomplete removal and infection.
Detail Level: Zone
Duration Of Freeze Thaw-Cycle (Seconds): 0

## 2023-05-02 ENCOUNTER — OFFICE VISIT (OUTPATIENT)
Dept: PODIATRY CLINIC | Facility: CLINIC | Age: 88
End: 2023-05-02

## 2023-05-02 DIAGNOSIS — R26.81 GAIT INSTABILITY: ICD-10-CM

## 2023-05-02 DIAGNOSIS — B35.1 ONYCHOMYCOSIS: Primary | ICD-10-CM

## 2023-05-02 PROCEDURE — 1126F AMNT PAIN NOTED NONE PRSNT: CPT | Performed by: PODIATRIST

## 2023-05-02 PROCEDURE — 99213 OFFICE O/P EST LOW 20 MIN: CPT | Performed by: PODIATRIST

## 2023-05-11 ENCOUNTER — MED REC SCAN ONLY (OUTPATIENT)
Dept: INTERNAL MEDICINE CLINIC | Facility: CLINIC | Age: 88
End: 2023-05-11

## 2023-05-18 ENCOUNTER — TELEPHONE (OUTPATIENT)
Dept: PODIATRY CLINIC | Facility: CLINIC | Age: 88
End: 2023-05-18

## 2023-05-18 NOTE — TELEPHONE ENCOUNTER
Pt's daughter called. Pt had an appointment on 5-2-23. Pt is having toenail discomfort. Right foot. Middle toe. Caller declined first available appointment on 5-31-23.   Please call

## 2023-05-18 NOTE — TELEPHONE ENCOUNTER
Spoke with daughter reports dad has been endorsing lateral border of 3rd toenail is sore to touch per daughter toe is not hot to touch denies draining visible redness but feels may be early signs of ingrown. Advised daughter to continue to monitor for signs of infection appt scheduled for 5/31/23 at 10 AM next available requesting sooner appt. Advised daughter I have added patient to waitlist should anyone cancel and will send to Dr. Marisa Burr for his review to see if he can add on sooner upon his return to clinic 5/22/23. Daughter verbalized understanding had no further questions.

## 2023-05-26 NOTE — TELEPHONE ENCOUNTER
S/w Daughter of the patient and she states that her father is doing well. Was wondering if we had any cancellations later in the day of his appointment on 5/31. Informed her that we currently don't have any spots available.  She states that she will stick to the current 10am appointment on 5/31/23

## 2023-05-31 ENCOUNTER — OFFICE VISIT (OUTPATIENT)
Dept: PODIATRY CLINIC | Facility: CLINIC | Age: 88
End: 2023-05-31

## 2023-05-31 DIAGNOSIS — L60.0 INGROWN TOENAIL OF RIGHT FOOT: Primary | ICD-10-CM

## 2023-05-31 DIAGNOSIS — M79.674 PAIN IN TOES OF BOTH FEET: ICD-10-CM

## 2023-05-31 DIAGNOSIS — M79.675 PAIN IN TOES OF BOTH FEET: ICD-10-CM

## 2023-05-31 DIAGNOSIS — R26.81 GAIT INSTABILITY: ICD-10-CM

## 2023-05-31 PROCEDURE — 99213 OFFICE O/P EST LOW 20 MIN: CPT | Performed by: PODIATRIST

## 2023-05-31 PROCEDURE — 1126F AMNT PAIN NOTED NONE PRSNT: CPT | Performed by: PODIATRIST

## 2023-06-03 ENCOUNTER — APPOINTMENT (OUTPATIENT)
Dept: GENERAL RADIOLOGY | Facility: HOSPITAL | Age: 88
End: 2023-06-03
Payer: MEDICARE

## 2023-06-03 ENCOUNTER — APPOINTMENT (OUTPATIENT)
Dept: CT IMAGING | Facility: HOSPITAL | Age: 88
End: 2023-06-03
Attending: EMERGENCY MEDICINE
Payer: MEDICARE

## 2023-06-03 ENCOUNTER — HOSPITAL ENCOUNTER (INPATIENT)
Facility: HOSPITAL | Age: 88
LOS: 4 days | Discharge: HOME HEALTH | End: 2023-06-07
Attending: EMERGENCY MEDICINE | Admitting: INTERNAL MEDICINE
Payer: MEDICARE

## 2023-06-03 ENCOUNTER — HOSPITAL ENCOUNTER (INPATIENT)
Facility: HOSPITAL | Age: 88
LOS: 4 days | Discharge: HOME | End: 2023-06-07
Attending: EMERGENCY MEDICINE
Payer: MEDICARE

## 2023-06-03 DIAGNOSIS — K85.90 ACUTE PANCREATITIS, UNSPECIFIED COMPLICATION STATUS, UNSPECIFIED PANCREATITIS TYPE: Primary | ICD-10-CM

## 2023-06-03 LAB
ALBUMIN SERPL-MCNC: 2.8 G/DL (ref 3.4–5)
ALBUMIN/GLOB SERPL: 0.6 {RATIO} (ref 1–2)
ALP LIVER SERPL-CCNC: 152 U/L
ALT SERPL-CCNC: 42 U/L
ANION GAP SERPL CALC-SCNC: 7 MMOL/L (ref 0–18)
AST SERPL-CCNC: 57 U/L (ref 15–37)
BASOPHILS # BLD AUTO: 0.02 X10(3) UL (ref 0–0.2)
BASOPHILS NFR BLD AUTO: 0.2 %
BILIRUB SERPL-MCNC: 1.9 MG/DL (ref 0.1–2)
BILIRUB UR QL: NEGATIVE
BUN BLD-MCNC: 16 MG/DL (ref 7–18)
BUN/CREAT SERPL: 14.3 (ref 10–20)
CALCIUM BLD-MCNC: 8.9 MG/DL (ref 8.5–10.1)
CHLORIDE SERPL-SCNC: 98 MMOL/L (ref 98–112)
CLARITY UR: CLEAR
CO2 SERPL-SCNC: 26 MMOL/L (ref 21–32)
COLOR UR: YELLOW
CREAT BLD-MCNC: 1.12 MG/DL
DEPRECATED RDW RBC AUTO: 52.5 FL (ref 35.1–46.3)
EOSINOPHIL # BLD AUTO: 0.14 X10(3) UL (ref 0–0.7)
EOSINOPHIL NFR BLD AUTO: 1.1 %
ERYTHROCYTE [DISTWIDTH] IN BLOOD BY AUTOMATED COUNT: 14.7 % (ref 11–15)
GFR SERPLBLD BASED ON 1.73 SQ M-ARVRAT: 60 ML/MIN/1.73M2 (ref 60–?)
GLOBULIN PLAS-MCNC: 4.5 G/DL (ref 2.8–4.4)
GLUCOSE BLD-MCNC: 87 MG/DL (ref 70–99)
GLUCOSE UR-MCNC: NORMAL MG/DL
HCT VFR BLD AUTO: 40.2 %
HGB BLD-MCNC: 13.4 G/DL
HYALINE CASTS #/AREA URNS AUTO: PRESENT /LPF
IMM GRANULOCYTES # BLD AUTO: 0.06 X10(3) UL (ref 0–1)
IMM GRANULOCYTES NFR BLD: 0.5 %
KETONES UR-MCNC: NEGATIVE MG/DL
LEUKOCYTE ESTERASE UR QL STRIP.AUTO: NEGATIVE
LIPASE SERPL-CCNC: 170 U/L (ref 13–75)
LYMPHOCYTES # BLD AUTO: 1 X10(3) UL (ref 1–4)
LYMPHOCYTES NFR BLD AUTO: 8.1 %
MCH RBC QN AUTO: 32.4 PG (ref 26–34)
MCHC RBC AUTO-ENTMCNC: 33.3 G/DL (ref 31–37)
MCV RBC AUTO: 97.3 FL
MONOCYTES # BLD AUTO: 1.17 X10(3) UL (ref 0.1–1)
MONOCYTES NFR BLD AUTO: 9.5 %
NEUTROPHILS # BLD AUTO: 9.88 X10 (3) UL (ref 1.5–7.7)
NEUTROPHILS # BLD AUTO: 9.88 X10(3) UL (ref 1.5–7.7)
NEUTROPHILS NFR BLD AUTO: 80.6 %
NITRITE UR QL STRIP.AUTO: NEGATIVE
OSMOLALITY SERPL CALC.SUM OF ELEC: 273 MOSM/KG (ref 275–295)
PH UR: 6.5 [PH] (ref 5–8)
PLATELET # BLD AUTO: 126 10(3)UL (ref 150–450)
POTASSIUM SERPL-SCNC: 4 MMOL/L (ref 3.5–5.1)
PROT SERPL-MCNC: 7.3 G/DL (ref 6.4–8.2)
PROT UR-MCNC: 30 MG/DL
Q-T INTERVAL: 430 MS
QRS DURATION: 120 MS
QTC CALCULATION (BEZET): 440 MS
R AXIS: 37 DEGREES
RBC # BLD AUTO: 4.13 X10(6)UL
RBC #/AREA URNS AUTO: >10 /HPF
SODIUM SERPL-SCNC: 131 MMOL/L (ref 136–145)
SP GR UR STRIP: 1.02 (ref 1–1.03)
T AXIS: -70 DEGREES
TROPONIN I HIGH SENSITIVITY: 52 NG/L
UROBILINOGEN UR STRIP-ACNC: 4
VENTRICULAR RATE: 63 BPM
WBC # BLD AUTO: 12.3 X10(3) UL (ref 4–11)

## 2023-06-03 PROCEDURE — 74177 CT ABD & PELVIS W/CONTRAST: CPT | Performed by: EMERGENCY MEDICINE

## 2023-06-03 PROCEDURE — 71045 X-RAY EXAM CHEST 1 VIEW: CPT | Performed by: EMERGENCY MEDICINE

## 2023-06-03 PROCEDURE — 99223 1ST HOSP IP/OBS HIGH 75: CPT | Performed by: INTERNAL MEDICINE

## 2023-06-03 RX ORDER — ATORVASTATIN CALCIUM 10 MG/1
10 TABLET, FILM COATED ORAL NIGHTLY
Status: DISCONTINUED | OUTPATIENT
Start: 2023-06-03 | End: 2023-06-07

## 2023-06-03 RX ORDER — POLYETHYLENE GLYCOL 3350 17 G/17G
17 POWDER, FOR SOLUTION ORAL AS NEEDED
COMMUNITY

## 2023-06-03 RX ORDER — DOCUSATE SODIUM 100 MG/1
100 CAPSULE, LIQUID FILLED ORAL 2 TIMES DAILY
Status: DISCONTINUED | OUTPATIENT
Start: 2023-06-03 | End: 2023-06-07

## 2023-06-03 RX ORDER — BISACODYL 10 MG
10 SUPPOSITORY, RECTAL RECTAL
Status: DISCONTINUED | OUTPATIENT
Start: 2023-06-03 | End: 2023-06-07

## 2023-06-03 RX ORDER — POLYETHYLENE GLYCOL 3350 17 G/17G
17 POWDER, FOR SOLUTION ORAL DAILY PRN
Status: DISCONTINUED | OUTPATIENT
Start: 2023-06-03 | End: 2023-06-07

## 2023-06-03 RX ORDER — BUMETANIDE 0.5 MG/1
0.5 TABLET ORAL 2 TIMES DAILY
Status: DISCONTINUED | OUTPATIENT
Start: 2023-06-03 | End: 2023-06-07

## 2023-06-03 RX ORDER — ENEMA 19; 7 G/133ML; G/133ML
1 ENEMA RECTAL ONCE AS NEEDED
Status: DISCONTINUED | OUTPATIENT
Start: 2023-06-03 | End: 2023-06-07

## 2023-06-03 RX ORDER — POTASSIUM CHLORIDE 20 MEQ/1
20 TABLET, EXTENDED RELEASE ORAL EVERY MORNING
Status: DISCONTINUED | OUTPATIENT
Start: 2023-06-04 | End: 2023-06-07

## 2023-06-03 RX ORDER — SODIUM CHLORIDE 9 MG/ML
125 INJECTION, SOLUTION INTRAVENOUS CONTINUOUS
Status: DISCONTINUED | OUTPATIENT
Start: 2023-06-03 | End: 2023-06-05

## 2023-06-03 NOTE — PLAN OF CARE
Patient is alert and disoriented per baseline. Received patient from ED, vital signs stable. Assessment and admission complete. Fall precautions in place, call light visible and within reach. Daughter at bedside and updated on plan of care. Problem: Patient Centered Care  Goal: Patient preferences are identified and integrated in the patient's plan of care  Description: Interventions:  - What would you like us to know as we care for you?  From home with daughter  - Provide timely, complete, and accurate information to patient/family  - Incorporate patient and family knowledge, values, beliefs, and cultural backgrounds into the planning and delivery of care  - Encourage patient/family to participate in care and decision-making at the level they choose  - Honor patient and family perspectives and choices  Outcome: Progressing     Problem: Patient/Family Goals  Goal: Patient/Family Long Term Goal  Description: Patient's Long Term Goal: GI stability    Interventions:  - diet and med management  - See additional Care Plan goals for specific interventions  Outcome: Progressing  Goal: Patient/Family Short Term Goal  Description: Patient's Short Term Goal: decreased pain/ increase strength    Interventions:   - diet management, PT/OT eval and tx, pain control  - See additional Care Plan goals for specific interventions  Outcome: Progressing

## 2023-06-03 NOTE — ED INITIAL ASSESSMENT (HPI)
Patient presents with daughter who is main caregiver. Daughter states patient has been weaker and more confused than usual. Daughter reports intermittent abdominal pain and decreased urine output. Patient has no complaints at this time.

## 2023-06-03 NOTE — ED QUICK NOTES
Orders for admission. Patient and/or next of kin aware of plan and is ready to go upstairs. Please call ED RN Darian Interiano at listed extension should you have any further questions or concerns. Thank you. Nurse and Ext: [facility], [id number]    Chief Presentation: WEAKNESS    Admission Diagnosis: ACUTE PANCREATITIS    Admitting/Attending Physician: Noemy Barroso    Neuro: A&OX2-3    Cardiac: A-FIB    Resp: ROOM AIR    GI: WNL    : PRIMOFIT IN PLACE - NO OUTPUT WHILE IN ED. Skin/IV: R FA (18) PATENT AND INFUSING NS @ 125CC/HR.     Other Pertinent Information: N/A    Type of COVID Test Sent: N/A    COVID Level of Suspicion: LOW    PRIMARY CARE PARTNER:

## 2023-06-04 LAB
ALBUMIN SERPL-MCNC: 2.7 G/DL (ref 3.4–5)
ALP LIVER SERPL-CCNC: 123 U/L
ALT SERPL-CCNC: 31 U/L
ANION GAP SERPL CALC-SCNC: 9 MMOL/L (ref 0–18)
AST SERPL-CCNC: 36 U/L (ref 15–37)
BASOPHILS # BLD AUTO: 0.03 X10(3) UL (ref 0–0.2)
BASOPHILS NFR BLD AUTO: 0.2 %
BILIRUB DIRECT SERPL-MCNC: 0.9 MG/DL (ref 0–0.2)
BILIRUB SERPL-MCNC: 1.9 MG/DL (ref 0.1–2)
BUN BLD-MCNC: 14 MG/DL (ref 7–18)
BUN/CREAT SERPL: 15.6 (ref 10–20)
CALCIUM BLD-MCNC: 8.5 MG/DL (ref 8.5–10.1)
CHLORIDE SERPL-SCNC: 100 MMOL/L (ref 98–112)
CO2 SERPL-SCNC: 25 MMOL/L (ref 21–32)
CREAT BLD-MCNC: 0.9 MG/DL
DEPRECATED RDW RBC AUTO: 52.7 FL (ref 35.1–46.3)
EOSINOPHIL # BLD AUTO: 0.06 X10(3) UL (ref 0–0.7)
EOSINOPHIL NFR BLD AUTO: 0.4 %
ERYTHROCYTE [DISTWIDTH] IN BLOOD BY AUTOMATED COUNT: 14.9 % (ref 11–15)
GFR SERPLBLD BASED ON 1.73 SQ M-ARVRAT: 78 ML/MIN/1.73M2 (ref 60–?)
GLUCOSE BLD-MCNC: 90 MG/DL (ref 70–99)
HCT VFR BLD AUTO: 37.8 %
HGB BLD-MCNC: 12.9 G/DL
IMM GRANULOCYTES # BLD AUTO: 0.1 X10(3) UL (ref 0–1)
IMM GRANULOCYTES NFR BLD: 0.7 %
LIPASE SERPL-CCNC: 28 U/L (ref 13–75)
LYMPHOCYTES # BLD AUTO: 1.09 X10(3) UL (ref 1–4)
LYMPHOCYTES NFR BLD AUTO: 7.5 %
MCH RBC QN AUTO: 33.1 PG (ref 26–34)
MCHC RBC AUTO-ENTMCNC: 34.1 G/DL (ref 31–37)
MCV RBC AUTO: 96.9 FL
MONOCYTES # BLD AUTO: 1.32 X10(3) UL (ref 0.1–1)
MONOCYTES NFR BLD AUTO: 9.1 %
NEUTROPHILS # BLD AUTO: 11.91 X10 (3) UL (ref 1.5–7.7)
NEUTROPHILS # BLD AUTO: 11.91 X10(3) UL (ref 1.5–7.7)
NEUTROPHILS NFR BLD AUTO: 82.1 %
OSMOLALITY SERPL CALC.SUM OF ELEC: 278 MOSM/KG (ref 275–295)
PLATELET # BLD AUTO: 125 10(3)UL (ref 150–450)
POTASSIUM SERPL-SCNC: 3.5 MMOL/L (ref 3.5–5.1)
POTASSIUM SERPL-SCNC: 4.6 MMOL/L (ref 3.5–5.1)
PROT SERPL-MCNC: 6.8 G/DL (ref 6.4–8.2)
RBC # BLD AUTO: 3.9 X10(6)UL
SODIUM SERPL-SCNC: 134 MMOL/L (ref 136–145)
WBC # BLD AUTO: 14.5 X10(3) UL (ref 4–11)

## 2023-06-04 PROCEDURE — 99232 SBSQ HOSP IP/OBS MODERATE 35: CPT | Performed by: INTERNAL MEDICINE

## 2023-06-04 RX ORDER — TAMSULOSIN HYDROCHLORIDE 0.4 MG/1
0.4 CAPSULE ORAL DAILY
Status: DISCONTINUED | OUTPATIENT
Start: 2023-06-04 | End: 2023-06-07

## 2023-06-04 RX ORDER — AMLODIPINE BESYLATE 2.5 MG/1
2.5 TABLET ORAL
Status: DISCONTINUED | OUTPATIENT
Start: 2023-06-04 | End: 2023-06-07

## 2023-06-04 RX ORDER — POTASSIUM CHLORIDE 20 MEQ/1
40 TABLET, EXTENDED RELEASE ORAL EVERY 4 HOURS
Status: COMPLETED | OUTPATIENT
Start: 2023-06-04 | End: 2023-06-04

## 2023-06-04 RX ORDER — PANTOPRAZOLE SODIUM 40 MG/1
40 TABLET, DELAYED RELEASE ORAL
Status: DISCONTINUED | OUTPATIENT
Start: 2023-06-04 | End: 2023-06-07

## 2023-06-04 NOTE — PLAN OF CARE
Diet advanced from clear liquid to full liquid diet today - will monitor intake. No c/o abdominal pain today. IV fluids continuing. BP more elevated this afternoon - Dr. Piña Mom notified and order for amlodipine received. Patient was confused in the morning but more confused this afternoon and Dr. Piña Mom also notified of this - states to continue to monitor. Daughter Suly Corey updated on this. Problem: Patient Centered Care  Goal: Patient preferences are identified and integrated in the patient's plan of care  Description: Interventions:  - What would you like us to know as we care for you?  I live with my dtr Suly Corey  - Provide timely, complete, and accurate information to patient/family  - Incorporate patient and family knowledge, values, beliefs, and cultural backgrounds into the planning and delivery of care  - Encourage patient/family to participate in care and decision-making at the level they choose  - Honor patient and family perspectives and choices  Outcome: Progressing     Problem: Patient/Family Goals  Goal: Patient/Family Short Term Goal  Description: Patient's Short Term Goal: less abdominal pain    Interventions:   - clear liq diet, advanced to full liq, advance as per GI orders, document intake  - IV fluids during GI rest  - prn pain meds as needed  - See additional Care Plan goals for specific interventions  Outcome: Progressing     Problem: SKIN/TISSUE INTEGRITY - ADULT  Goal: Skin integrity remains intact  Description: INTERVENTIONS  - Assess and document risk factors for pressure ulcer development  - Assess and document skin integrity  - Monitor for areas of redness and/or skin breakdown  - Initiate interventions, skin care algorithm/standards of care as needed  Outcome: Progressing     Problem: PAIN - ADULT  Goal: Verbalizes/displays adequate comfort level or patient's stated pain goal  Description: INTERVENTIONS:  - Encourage pt to monitor pain and request assistance  - Assess pain using appropriate pain scale  - Administer analgesics based on type and severity of pain and evaluate response  - Implement non-pharmacological measures as appropriate and evaluate response  - Consider cultural and social influences on pain and pain management  - Manage/alleviate anxiety  - Utilize distraction and/or relaxation techniques  - Monitor for opioid side effects  - Notify MD/LIP if interventions unsuccessful or patient reports new pain  - Anticipate increased pain with activity and pre-medicate as appropriate  Outcome: Progressing     Problem: SAFETY ADULT - FALL  Goal: Free from fall injury  Description: INTERVENTIONS:  - Assess pt frequently for physical needs  - Identify cognitive and physical deficits and behaviors that affect risk of falls.   - [place] fall precautions as indicated by assessment.  - Educate pt/family on patient safety including physical limitations  - Instruct pt to call for assistance with activity based on assessment  - Modify environment to reduce risk of injury  - Provide assistive devices as appropriate  - Consider OT/PT consult to assist with strengthening/mobility  - Encourage toileting schedule  Outcome: Progressing     Problem: GASTROINTESTINAL - ADULT  Goal: Maintains or returns to baseline bowel function  Description: INTERVENTIONS:  - Assess bowel function  - Maintain adequate hydration with IV or PO as ordered and tolerated  - Evaluate effectiveness of GI medications  - Encourage mobilization and activity  - Obtain nutritional consult as needed  - Establish a toileting routine/schedule  - Consider collaborating with pharmacy to review patient's medication profile  Outcome: Progressing  Goal: Maintains adequate nutritional intake (undernourished)  Description: INTERVENTIONS:  - Monitor percentage of each meal consumed  - Identify factors contributing to decreased intake, treat as appropriate  - Assist with meals as needed  - Monitor I&O, WT and lab values  - Obtain nutritional consult as needed  - Optimize oral hygiene and moisture  - Encourage food from home; allow for food preferences  - Enhance eating environment  Outcome: Progressing     Problem: Patient/Family Goals  Goal: Patient/Family Long Term Goal  Description: Patient's Long Term Goal: keep up my strength    Interventions:  - up to chair or ambulate as tolerated  - Pt/Ot eval  - See additional Care Plan goals for specific interventions  Outcome: Not Progressing

## 2023-06-04 NOTE — PLAN OF CARE
Problem: SKIN/TISSUE INTEGRITY - ADULT  Goal: Skin integrity remains intact  Description: INTERVENTIONS  - Assess and document risk factors for pressure ulcer development  - Assess and document skin integrity  - Monitor for areas of redness and/or skin breakdown  - Initiate interventions, skin care algorithm/standards of care as needed  Outcome: Progressing     Problem: PAIN - ADULT  Goal: Verbalizes/displays adequate comfort level or patient's stated pain goal  Description: INTERVENTIONS:  - Encourage pt to monitor pain and request assistance  - Assess pain using appropriate pain scale  - Administer analgesics based on type and severity of pain and evaluate response  - Implement non-pharmacological measures as appropriate and evaluate response  - Consider cultural and social influences on pain and pain management  - Manage/alleviate anxiety  - Utilize distraction and/or relaxation techniques  - Monitor for opioid side effects  - Notify MD/LIP if interventions unsuccessful or patient reports new pain  - Anticipate increased pain with activity and pre-medicate as appropriate  Outcome: Progressing     Problem: SAFETY ADULT - FALL  Goal: Free from fall injury  Description: INTERVENTIONS:  - Assess pt frequently for physical needs  - Identify cognitive and physical deficits and behaviors that affect risk of falls.   - [place] fall precautions as indicated by assessment.  - Educate pt/family on patient safety including physical limitations  - Instruct pt to call for assistance with activity based on assessment  - Modify environment to reduce risk of injury  - Provide assistive devices as appropriate  - Consider OT/PT consult to assist with strengthening/mobility  - Encourage toileting schedule  Outcome: Progressing     Pt is resting in bed, vital signs stable, safety/fall precautions in place, call light within reach, bed locked and in lowest position, with alarm audible, all needs met at this time,plan of care ongoing.

## 2023-06-05 LAB
ALBUMIN SERPL-MCNC: 2.6 G/DL (ref 3.4–5)
ALBUMIN/GLOB SERPL: 0.6 {RATIO} (ref 1–2)
ALP LIVER SERPL-CCNC: 112 U/L
ALT SERPL-CCNC: 23 U/L
ANION GAP SERPL CALC-SCNC: 9 MMOL/L (ref 0–18)
AST SERPL-CCNC: 33 U/L (ref 15–37)
BASOPHILS # BLD AUTO: 0.03 X10(3) UL (ref 0–0.2)
BASOPHILS NFR BLD AUTO: 0.2 %
BILIRUB SERPL-MCNC: 2.4 MG/DL (ref 0.1–2)
BUN BLD-MCNC: 15 MG/DL (ref 7–18)
BUN/CREAT SERPL: 18.8 (ref 10–20)
CALCIUM BLD-MCNC: 8.9 MG/DL (ref 8.5–10.1)
CHLORIDE SERPL-SCNC: 101 MMOL/L (ref 98–112)
CO2 SERPL-SCNC: 25 MMOL/L (ref 21–32)
CREAT BLD-MCNC: 0.8 MG/DL
DEPRECATED RDW RBC AUTO: 52 FL (ref 35.1–46.3)
EOSINOPHIL # BLD AUTO: 0.11 X10(3) UL (ref 0–0.7)
EOSINOPHIL NFR BLD AUTO: 0.8 %
ERYTHROCYTE [DISTWIDTH] IN BLOOD BY AUTOMATED COUNT: 14.7 % (ref 11–15)
GFR SERPLBLD BASED ON 1.73 SQ M-ARVRAT: 80 ML/MIN/1.73M2 (ref 60–?)
GLOBULIN PLAS-MCNC: 4.2 G/DL (ref 2.8–4.4)
GLUCOSE BLD-MCNC: 105 MG/DL (ref 70–99)
HCT VFR BLD AUTO: 38.4 %
HGB BLD-MCNC: 12.9 G/DL
IMM GRANULOCYTES # BLD AUTO: 0.08 X10(3) UL (ref 0–1)
IMM GRANULOCYTES NFR BLD: 0.6 %
LYMPHOCYTES # BLD AUTO: 0.97 X10(3) UL (ref 1–4)
LYMPHOCYTES NFR BLD AUTO: 6.9 %
MCH RBC QN AUTO: 32.3 PG (ref 26–34)
MCHC RBC AUTO-ENTMCNC: 33.6 G/DL (ref 31–37)
MCV RBC AUTO: 96.2 FL
MONOCYTES # BLD AUTO: 1.48 X10(3) UL (ref 0.1–1)
MONOCYTES NFR BLD AUTO: 10.5 %
NEUTROPHILS # BLD AUTO: 11.44 X10 (3) UL (ref 1.5–7.7)
NEUTROPHILS # BLD AUTO: 11.44 X10(3) UL (ref 1.5–7.7)
NEUTROPHILS NFR BLD AUTO: 81 %
OSMOLALITY SERPL CALC.SUM OF ELEC: 281 MOSM/KG (ref 275–295)
PLATELET # BLD AUTO: 127 10(3)UL (ref 150–450)
POTASSIUM SERPL-SCNC: 3.7 MMOL/L (ref 3.5–5.1)
PROT SERPL-MCNC: 6.8 G/DL (ref 6.4–8.2)
RBC # BLD AUTO: 3.99 X10(6)UL
SODIUM SERPL-SCNC: 135 MMOL/L (ref 136–145)
WBC # BLD AUTO: 14.1 X10(3) UL (ref 4–11)

## 2023-06-05 PROCEDURE — 99232 SBSQ HOSP IP/OBS MODERATE 35: CPT | Performed by: REGISTERED NURSE

## 2023-06-05 PROCEDURE — 99232 SBSQ HOSP IP/OBS MODERATE 35: CPT | Performed by: INTERNAL MEDICINE

## 2023-06-05 RX ORDER — HYDRALAZINE HYDROCHLORIDE 25 MG/1
25 TABLET, FILM COATED ORAL EVERY 6 HOURS PRN
Status: DISCONTINUED | OUTPATIENT
Start: 2023-06-05 | End: 2023-06-07

## 2023-06-05 RX ORDER — POTASSIUM CHLORIDE 20 MEQ/1
40 TABLET, EXTENDED RELEASE ORAL ONCE
Status: COMPLETED | OUTPATIENT
Start: 2023-06-05 | End: 2023-06-05

## 2023-06-05 RX ORDER — LORAZEPAM 2 MG/ML
1 INJECTION INTRAMUSCULAR EVERY 6 HOURS PRN
Status: DISCONTINUED | OUTPATIENT
Start: 2023-06-05 | End: 2023-06-07

## 2023-06-05 RX ORDER — ALLOPURINOL 300 MG/1
300 TABLET ORAL DAILY
Status: DISCONTINUED | OUTPATIENT
Start: 2023-06-05 | End: 2023-06-07

## 2023-06-05 NOTE — PLAN OF CARE
Problem: Patient Centered Care  Goal: Patient preferences are identified and integrated in the patient's plan of care  Description: Interventions:  - What would you like us to know as we care for you?  I live with my dtr Abundio Pascual  - Provide timely, complete, and accurate information to patient/family  - Incorporate patient and family knowledge, values, beliefs, and cultural backgrounds into the planning and delivery of care  - Encourage patient/family to participate in care and decision-making at the level they choose  - Honor patient and family perspectives and choices  Outcome: Progressing     Problem: Patient/Family Goals  Goal: Patient/Family Long Term Goal  Description: Patient's Long Term Goal: keep up my strength    Interventions:  - up to chair or ambulate as tolerated  - Pt/Ot eval  - See additional Care Plan goals for specific interventions  Outcome: Progressing  Goal: Patient/Family Short Term Goal  Description: Patient's Short Term Goal: less abdominal pain    Interventions:   - clear liq diet, advanced to full liq, advance as per GI orders, document intake  - IV fluids during GI rest  - prn pain meds as needed  - See additional Care Plan goals for specific interventions  Outcome: Progressing     Problem: SKIN/TISSUE INTEGRITY - ADULT  Goal: Skin integrity remains intact  Description: INTERVENTIONS  - Assess and document risk factors for pressure ulcer development  - Assess and document skin integrity  - Monitor for areas of redness and/or skin breakdown  - Initiate interventions, skin care algorithm/standards of care as needed  Outcome: Progressing     Problem: GASTROINTESTINAL - ADULT  Goal: Maintains or returns to baseline bowel function  Description: INTERVENTIONS:  - Assess bowel function  - Maintain adequate hydration with IV or PO as ordered and tolerated  - Evaluate effectiveness of GI medications  - Encourage mobilization and activity  - Obtain nutritional consult as needed  - Establish a toileting routine/schedule  - Consider collaborating with pharmacy to review patient's medication profile  Outcome: Progressing  Goal: Maintains adequate nutritional intake (undernourished)  Description: INTERVENTIONS:  - Monitor percentage of each meal consumed  - Identify factors contributing to decreased intake, treat as appropriate  - Assist with meals as needed  - Monitor I&O, WT and lab values  - Obtain nutritional consult as needed  - Optimize oral hygiene and moisture  - Encourage food from home; allow for food preferences  - Enhance eating environment  Outcome: Progressing     Problem: PAIN - ADULT  Goal: Verbalizes/displays adequate comfort level or patient's stated pain goal  Description: INTERVENTIONS:  - Encourage pt to monitor pain and request assistance  - Assess pain using appropriate pain scale  - Administer analgesics based on type and severity of pain and evaluate response  - Implement non-pharmacological measures as appropriate and evaluate response  - Consider cultural and social influences on pain and pain management  - Manage/alleviate anxiety  - Utilize distraction and/or relaxation techniques  - Monitor for opioid side effects  - Notify MD/LIP if interventions unsuccessful or patient reports new pain  - Anticipate increased pain with activity and pre-medicate as appropriate  Outcome: Progressing     Problem: SAFETY ADULT - FALL  Goal: Free from fall injury  Description: INTERVENTIONS:  - Assess pt frequently for physical needs  - Identify cognitive and physical deficits and behaviors that affect risk of falls.   - [place] fall precautions as indicated by assessment.  - Educate pt/family on patient safety including physical limitations  - Instruct pt to call for assistance with activity based on assessment  - Modify environment to reduce risk of injury  - Provide assistive devices as appropriate  - Consider OT/PT consult to assist with strengthening/mobility  - Encourage toileting schedule  Outcome: Progressing     Pt resting in bed, confused/agitated/hallucinating MD aware, safety/fall precautions in place, call light within reach, bed locked and in lowest position, all needs met at this time, plan of care ongoing.

## 2023-06-05 NOTE — PLAN OF CARE
He has not been agitated or restless this shift. His diet was advanced to low fiber but his po intake is fairly poor. He has not complained of pain today. He received miralax yesterday and today but no BM since 6/1 per dtr - discussed option of milk of mag tonight and dtr Pat agreed with this. Problem: Patient Centered Care  Goal: Patient preferences are identified and integrated in the patient's plan of care  Description: Interventions:  - What would you like us to know as we care for you?  I live with my dtr Kurt Dubin  - Provide timely, complete, and accurate information to patient/family  - Incorporate patient and family knowledge, values, beliefs, and cultural backgrounds into the planning and delivery of care  - Encourage patient/family to participate in care and decision-making at the level they choose  - Honor patient and family perspectives and choices  Outcome: Progressing     Problem: Patient/Family Goals  Goal: Patient/Family Long Term Goal  Description: Patient's Long Term Goal: keep up my strength    Interventions:  - up to chair or ambulate as tolerated  - Pt/Ot eval  - See additional Care Plan goals for specific interventions  Outcome: Progressing  Goal: Patient/Family Short Term Goal  Description: Patient's Short Term Goal: less abdominal pain    Interventions:   - clear liq diet, advanced to full liq, advance as per GI orders, document intake  - IV fluids during GI rest  - prn pain meds as needed  - See additional Care Plan goals for specific interventions  Outcome: Progressing     Problem: SKIN/TISSUE INTEGRITY - ADULT  Goal: Skin integrity remains intact  Description: INTERVENTIONS  - Assess and document risk factors for pressure ulcer development  - Assess and document skin integrity  - Monitor for areas of redness and/or skin breakdown  - Initiate interventions, skin care algorithm/standards of care as needed  Outcome: Progressing     Problem: PAIN - ADULT  Goal: Verbalizes/displays adequate comfort level or patient's stated pain goal  Description: INTERVENTIONS:  - Encourage pt to monitor pain and request assistance  - Assess pain using appropriate pain scale  - Administer analgesics based on type and severity of pain and evaluate response  - Implement non-pharmacological measures as appropriate and evaluate response  - Consider cultural and social influences on pain and pain management  - Manage/alleviate anxiety  - Utilize distraction and/or relaxation techniques  - Monitor for opioid side effects  - Notify MD/LIP if interventions unsuccessful or patient reports new pain  - Anticipate increased pain with activity and pre-medicate as appropriate  Outcome: Progressing     Problem: SAFETY ADULT - FALL  Goal: Free from fall injury  Description: INTERVENTIONS:  - Assess pt frequently for physical needs  - Identify cognitive and physical deficits and behaviors that affect risk of falls.   - [place] fall precautions as indicated by assessment.  - Educate pt/family on patient safety including physical limitations  - Instruct pt to call for assistance with activity based on assessment  - Modify environment to reduce risk of injury  - Provide assistive devices as appropriate  - Consider OT/PT consult to assist with strengthening/mobility  - Encourage toileting schedule  Outcome: Progressing     Problem: GASTROINTESTINAL - ADULT  Goal: Maintains or returns to baseline bowel function  Description: INTERVENTIONS:  - Assess bowel function  - Maintain adequate hydration with IV or PO as ordered and tolerated  - Evaluate effectiveness of GI medications  - Encourage mobilization and activity  - Obtain nutritional consult as needed  - Establish a toileting routine/schedule  - Consider collaborating with pharmacy to review patient's medication profile  Outcome: Not Progressing  Goal: Maintains adequate nutritional intake (undernourished)  Description: INTERVENTIONS:  - Monitor percentage of each meal consumed  - Identify factors contributing to decreased intake, treat as appropriate  - Assist with meals as needed  - Monitor I&O, WT and lab values  - Obtain nutritional consult as needed  - Optimize oral hygiene and moisture  - Encourage food from home; allow for food preferences  - Enhance eating environment  Outcome: Not Progressing

## 2023-06-05 NOTE — PHYSICAL THERAPY NOTE
06/05/23 1500   VISIT TYPE   PT Inpatient Visit Type (Documentation Required) Attempted Evaluation     PT orders received, chart reviewed. 2nd attempt today per RN and family request. Pt seen in room, sleeping in recliner chair, daughter at bedside. Attempted to wake pt, however pt remains sleepy and unable to keep eyes open, shook his head when asked if he would attempt transfer/gait training with PT multiple times. Daughter also attempted to wake and encourage pt without success. Daughter agreeable to follow up tomorrow AM, requested late morning time (10-11 am). Will follow up as appropriate and able. RN aware.

## 2023-06-05 NOTE — PHYSICAL THERAPY NOTE
PT order received, chart review completed. Per RN pt with worsening delirium overnight and has been asleep since 4am. Pts dtr present at bedside, she voices understanding importance of patient getting up but requesting to hold evaluation until this afternoon as pt is currently sleeping. At baseline, pt lives in house with dtr. She is his primary CG and also has part time CG 3x/week. He ambulates with 4WW around home and uses transport chair in community. He also has access to . BSC in room. Pt requires supervision for mobility around home and assist as needed with ADL.s     Will follow up this afternoon as able.  RN is aware of session attempt and family request.

## 2023-06-06 LAB
ALBUMIN SERPL-MCNC: 2.4 G/DL (ref 3.4–5)
ALBUMIN/GLOB SERPL: 0.5 {RATIO} (ref 1–2)
ALP LIVER SERPL-CCNC: 121 U/L
ALT SERPL-CCNC: 24 U/L
ANION GAP SERPL CALC-SCNC: 3 MMOL/L (ref 0–18)
AST SERPL-CCNC: 35 U/L (ref 15–37)
BASOPHILS # BLD AUTO: 0.02 X10(3) UL (ref 0–0.2)
BASOPHILS NFR BLD AUTO: 0.2 %
BILIRUB SERPL-MCNC: 2 MG/DL (ref 0.1–2)
BUN BLD-MCNC: 18 MG/DL (ref 7–18)
BUN/CREAT SERPL: 21.2 (ref 10–20)
CALCIUM BLD-MCNC: 8.7 MG/DL (ref 8.5–10.1)
CHLORIDE SERPL-SCNC: 100 MMOL/L (ref 98–112)
CO2 SERPL-SCNC: 27 MMOL/L (ref 21–32)
CREAT BLD-MCNC: 0.85 MG/DL
DEPRECATED RDW RBC AUTO: 51.2 FL (ref 35.1–46.3)
EOSINOPHIL # BLD AUTO: 0.36 X10(3) UL (ref 0–0.7)
EOSINOPHIL NFR BLD AUTO: 3.1 %
ERYTHROCYTE [DISTWIDTH] IN BLOOD BY AUTOMATED COUNT: 14.7 % (ref 11–15)
GFR SERPLBLD BASED ON 1.73 SQ M-ARVRAT: 79 ML/MIN/1.73M2 (ref 60–?)
GLOBULIN PLAS-MCNC: 4.4 G/DL (ref 2.8–4.4)
GLUCOSE BLD-MCNC: 110 MG/DL (ref 70–99)
HCT VFR BLD AUTO: 37.4 %
HGB BLD-MCNC: 12.7 G/DL
IMM GRANULOCYTES # BLD AUTO: 0.05 X10(3) UL (ref 0–1)
IMM GRANULOCYTES NFR BLD: 0.4 %
LYMPHOCYTES # BLD AUTO: 0.96 X10(3) UL (ref 1–4)
LYMPHOCYTES NFR BLD AUTO: 8.4 %
MCH RBC QN AUTO: 32.3 PG (ref 26–34)
MCHC RBC AUTO-ENTMCNC: 34 G/DL (ref 31–37)
MCV RBC AUTO: 95.2 FL
MONOCYTES # BLD AUTO: 1.46 X10(3) UL (ref 0.1–1)
MONOCYTES NFR BLD AUTO: 12.7 %
NEUTROPHILS # BLD AUTO: 8.64 X10 (3) UL (ref 1.5–7.7)
NEUTROPHILS # BLD AUTO: 8.64 X10(3) UL (ref 1.5–7.7)
NEUTROPHILS NFR BLD AUTO: 75.2 %
OSMOLALITY SERPL CALC.SUM OF ELEC: 273 MOSM/KG (ref 275–295)
PLATELET # BLD AUTO: 136 10(3)UL (ref 150–450)
POTASSIUM SERPL-SCNC: 3.7 MMOL/L (ref 3.5–5.1)
POTASSIUM SERPL-SCNC: 3.7 MMOL/L (ref 3.5–5.1)
PROT SERPL-MCNC: 6.8 G/DL (ref 6.4–8.2)
RBC # BLD AUTO: 3.93 X10(6)UL
SODIUM SERPL-SCNC: 130 MMOL/L (ref 136–145)
WBC # BLD AUTO: 11.5 X10(3) UL (ref 4–11)

## 2023-06-06 PROCEDURE — 99233 SBSQ HOSP IP/OBS HIGH 50: CPT | Performed by: NURSE PRACTITIONER

## 2023-06-06 NOTE — DISCHARGE INSTRUCTIONS
Sometimes managing your health at home requires assistance. The [facility] team has recognized your preference to use [facility]. They can be reached by phone at [phone]. The fax number for your reference is [phone]. A representative from the home health agency will contact you or your family to schedule your first visit.

## 2023-06-06 NOTE — CM/SW NOTE
06/06/23 1400   CM/SW Referral Data   Referral Source Social Work (self-referral)   Reason for Referral Discharge planning   Informant Daughter   Medical Hx   Does patient have an established PCP? Yes  Trent [place],)   Patient Info   Advanced directives? Yes   Patient's Current Mental Status at Time of Assessment Confused or unable to complete assessment   Patient's [address]   Number of Levels in Home 1   Patient lives with Daughter   Patient Status Prior to Admission   Independent with ADLs and Mobility No   Pt. requires assistance with Housework;Driving;Meals; Bathing; Ambulating;Medications;Dressing;Feeding   Services in place prior to admission [address] Provider Private Duty   [facility] hours per day 7 hours 3x a week every other sat   Discharge Needs   Anticipated D/C needs Home health care   Sw initiated self referral for discharge planning. Sw introduced self and role to daughter at bedside. Daughter provided above information. Daughter reports that pt still owns his home in [place], but is currently [place] daughter and [place] [facility] [place] [address]. Pt has part time cg 3x week  for 7 hours and every other Saturday. Pt has rollator, rw, transport chair, beside commode, and raise toliet seat. Pt does not have hx of DONI, but does have hx of HH. Daughter was given resources for ramp/stair lift. Daughter was informed of PT recs, hh. Daughter is agreeable and wishes to see if pt can go back with [facility]. F2F placed. Nathaniel Domingo from [facility] made aware of request. Sw initiated referrals via aidin. 451pm- SW was informed that patient accepted with [facility]. Pt received MDO for discharge. SW updated daughter on transportation methods. Daughter explained that pt needs help getting up the stairs into the daughter's home. SW explained that pt can go via [facility] with lift assist. Daughter explained that pt has dementia.  Sw explained that [facility] would have to pull over if pt ask the pt to pull over. Daughter wishes for an ambulance. Daughter was informed that there will be cost, and JOSI miles the cost, due to superior will inform JOSI that billing has to go through insurance first. Daughter is aware of DC. Daughter asked for tomorrow time for DC transport due to superior running behind. SW scheduled transport for tomorrow at [address] [facility] made aware. PCS Form completed. Plan: Pending medical clearance, DC to Home with daughter and RH,f2f placed,    JOSI/SELINA to remain available for support and/or discharge planning.      Ankush Luna, MSW, LSW   x [phone]

## 2023-06-06 NOTE — CDS QUERY
Clinical Significance - Sodium Value  CLINICAL DOCUMENTATION CLARIFICATION FORM  Dear: Alex Larsen information (provided below) includes documentation of sodium value that is not within the normal range. For accurate ICD-10-CM code assignment to reflect severity of illness and risk of mortality,  PLEASE CHECK ALL DIAGNOSES THAT APPLY AND PLEASE DOCUMENT IN THE PROGRESS NOTE. THIS SECTION FOR PROVIDER DOCUMENTATION ONLY      [ x ] Hyponatremia  [  ] Clinically Insignificant below normal sodium laboratory result  [  ] Other (please specify): ___________________________________________________________  [  ] Unable to Determine (please comment) _____________________________________________    Please provide the appropriate diagnosis for the below abnormal findings, treatment and/or monitoring rendered or provided    RISK FACTORS: Acute Pancreatitis    CLINICAL INDICATORS: Na+: 131  . .134  . . 135  . Leane Odalys  130    TREATMENT: IVF, Monitor Labs    For questions regarding this query, please contact Clinical Documentation Integrity: ZELALEM Gooden RN, [address] work phone: [phone]       THIS [address] [place] [address] the Revised 9/2015

## 2023-06-06 NOTE — HOME CARE LIAISON
Discussed with patient's dtr Lupe butler of [facility] providers from [facility], patient choice is [facility]idential [facility]. Agency reserved in [facility] and contact information placed on AVS. Financial interest disclosure provided to patient. JOSI Farr updated.

## 2023-06-06 NOTE — PLAN OF CARE
No changes noted to patient condition Throughout the night. Patient and daughter reports patient has not had BM for several days, was given MOM and Miralax with no results overnight yet. Daughter at bedside overnight. Fall precautions in place and safety maintained. Frequent rounding by nursing staff. Problem: Patient Centered Care  Goal: Patient preferences are identified and integrated in the patient's plan of care  Description: Interventions:  - What would you like us to know as we care for you?  I live with my dtr Denisha Mims  - Provide timely, complete, and accurate information to patient/family  - Incorporate patient and family knowledge, values, beliefs, and cultural backgrounds into the planning and delivery of care  - Encourage patient/family to participate in care and decision-making at the level they choose  - Honor patient and family perspectives and choices  Outcome: Progressing     Problem: Patient/Family Goals  Goal: Patient/Family Long Term Goal  Description: Patient's Long Term Goal: keep up my strength    Interventions:  - up to chair or ambulate as tolerated  - Pt/Ot eval  - See additional Care Plan goals for specific interventions  Outcome: Progressing  Goal: Patient/Family Short Term Goal  Description: Patient's Short Term Goal: less abdominal pain    Interventions:   - clear liq diet, advanced to full liq, advance as per GI orders, document intake  - IV fluids during GI rest  - prn pain meds as needed  - See additional Care Plan goals for specific interventions  Outcome: Progressing     Problem: SKIN/TISSUE INTEGRITY - ADULT  Goal: Skin integrity remains intact  Description: INTERVENTIONS  - Assess and document risk factors for pressure ulcer development  - Assess and document skin integrity  - Monitor for areas of redness and/or skin breakdown  - Initiate interventions, skin care algorithm/standards of care as needed  Outcome: Progressing     Problem: PAIN - ADULT  Goal: Verbalizes/displays adequate comfort level or patient's stated pain goal  Description: INTERVENTIONS:  - Encourage pt to monitor pain and request assistance  - Assess pain using appropriate pain scale  - Administer analgesics based on type and severity of pain and evaluate response  - Implement non-pharmacological measures as appropriate and evaluate response  - Consider cultural and social influences on pain and pain management  - Manage/alleviate anxiety  - Utilize distraction and/or relaxation techniques  - Monitor for opioid side effects  - Notify MD/LIP if interventions unsuccessful or patient reports new pain  - Anticipate increased pain with activity and pre-medicate as appropriate  Outcome: Progressing     Problem: SAFETY ADULT - FALL  Goal: Free from fall injury  Description: INTERVENTIONS:  - Assess pt frequently for physical needs  - Identify cognitive and physical deficits and behaviors that affect risk of falls.   - [place] fall precautions as indicated by assessment.  - Educate pt/family on patient safety including physical limitations  - Instruct pt to call for assistance with activity based on assessment  - Modify environment to reduce risk of injury  - Provide assistive devices as appropriate  - Consider OT/PT consult to assist with strengthening/mobility  - Encourage toileting schedule  Outcome: Progressing     Problem: GASTROINTESTINAL - ADULT  Goal: Maintains or returns to baseline bowel function  Description: INTERVENTIONS:  - Assess bowel function  - Maintain adequate hydration with IV or PO as ordered and tolerated  - Evaluate effectiveness of GI medications  - Encourage mobilization and activity  - Obtain nutritional consult as needed  - Establish a toileting routine/schedule  - Consider collaborating with pharmacy to review patient's medication profile  Outcome: Progressing  Goal: Maintains adequate nutritional intake (undernourished)  Description: INTERVENTIONS:  - Monitor percentage of each meal consumed  - Identify factors contributing to decreased intake, treat as appropriate  - Assist with meals as needed  - Monitor I&O, WT and lab values  - Obtain nutritional consult as needed  - Optimize oral hygiene and moisture  - Encourage food from home; allow for food preferences  - Enhance eating environment  Outcome: Progressing

## 2023-06-06 NOTE — PROGRESS NOTES
Wilfredo Pa, daughter requesting to discharge patient tomorrow. Informed Dr. Fern Lares. Discharge on hold until 6/7/23.

## 2023-06-07 VITALS
HEIGHT: 72 IN | DIASTOLIC BLOOD PRESSURE: 64 MMHG | OXYGEN SATURATION: 94 % | WEIGHT: 179 LBS | HEART RATE: 61 BPM | SYSTOLIC BLOOD PRESSURE: 142 MMHG | TEMPERATURE: 98 F | BODY MASS INDEX: 24.24 KG/M2 | RESPIRATION RATE: 17 BRPM

## 2023-06-07 NOTE — CM/SW NOTE
06/07/23 0900   Discharge disposition   Expected discharge disposition Home or Self   Additional Home Care/Hospice Provider Residential   Discharge transportation SCYNEXIS     Pt received MDO for discharge. Pt will be going to daughter's home - [address], [address]. Pt is current with [facility] for pt ot rn and rn wound care. Daughter is willing to learn to do wound care. Karina Jacob from [facility] notified of DC today. [facility] . PCS form completed      SW/CM to remain available for support and/or discharge planning.      Nalini Poole, MSW, LSW   x [phone]

## 2023-06-07 NOTE — PLAN OF CARE
Problem: Patient Centered Care  Goal: Patient preferences are identified and integrated in the patient's plan of care  Description: Interventions:  - What would you like us to know as we care for you?  I live with my dtr Alden Amaya  - Provide timely, complete, and accurate information to patient/family  - Incorporate patient and family knowledge, values, beliefs, and cultural backgrounds into the planning and delivery of care  - Encourage patient/family to participate in care and decision-making at the level they choose  - Honor patient and family perspectives and choices  Outcome: Progressing     Problem: Patient/Family Goals  Goal: Patient/Family Long Term Goal  Description: Patient's Long Term Goal: keep up my strength    Interventions:  - up to chair or ambulate as tolerated  - Pt/Ot eval  - See additional Care Plan goals for specific interventions  Outcome: Progressing  Goal: Patient/Family Short Term Goal  Description: Patient's Short Term Goal: less abdominal pain    Interventions:   - clear liq diet, advanced to full liq, advance as per GI orders, document intake  - IV fluids during GI rest  - prn pain meds as needed  - See additional Care Plan goals for specific interventions  Outcome: Progressing     Problem: SKIN/TISSUE INTEGRITY - ADULT  Goal: Skin integrity remains intact  Description: INTERVENTIONS  - Assess and document risk factors for pressure ulcer development  - Assess and document skin integrity  - Monitor for areas of redness and/or skin breakdown  - Initiate interventions, skin care algorithm/standards of care as needed  Outcome: Progressing     Problem: PAIN - ADULT  Goal: Verbalizes/displays adequate comfort level or patient's stated pain goal  Description: INTERVENTIONS:  - Encourage pt to monitor pain and request assistance  - Assess pain using appropriate pain scale  - Administer analgesics based on type and severity of pain and evaluate response  - Implement non-pharmacological measures as appropriate and evaluate response  - Consider cultural and social influences on pain and pain management  - Manage/alleviate anxiety  - Utilize distraction and/or relaxation techniques  - Monitor for opioid side effects  - Notify MD/LIP if interventions unsuccessful or patient reports new pain  - Anticipate increased pain with activity and pre-medicate as appropriate  Outcome: Progressing     Problem: SAFETY ADULT - FALL  Goal: Free from fall injury  Description: INTERVENTIONS:  - Assess pt frequently for physical needs  - Identify cognitive and physical deficits and behaviors that affect risk of falls.   - [place] fall precautions as indicated by assessment.  - Educate pt/family on patient safety including physical limitations  - Instruct pt to call for assistance with activity based on assessment  - Modify environment to reduce risk of injury  - Provide assistive devices as appropriate  - Consider OT/PT consult to assist with strengthening/mobility  - Encourage toileting schedule  Outcome: Progressing     Problem: GASTROINTESTINAL - ADULT  Goal: Maintains or returns to baseline bowel function  Description: INTERVENTIONS:  - Assess bowel function  - Maintain adequate hydration with IV or PO as ordered and tolerated  - Evaluate effectiveness of GI medications  - Encourage mobilization and activity  - Obtain nutritional consult as needed  - Establish a toileting routine/schedule  - Consider collaborating with pharmacy to review patient's medication profile  Outcome: Progressing  Goal: Maintains adequate nutritional intake (undernourished)  Description: INTERVENTIONS:  - Monitor percentage of each meal consumed  - Identify factors contributing to decreased intake, treat as appropriate  - Assist with meals as needed  - Monitor I&O, WT and lab values  - Obtain nutritional consult as needed  - Optimize oral hygiene and moisture  - Encourage food from home; allow for food preferences  - Enhance eating environment  Outcome: Progressing   No change in patient status, vital signs stable , fall precautions in place , call light within reach, daughter at the bedside , plan for discharge home today at 10 am

## 2023-06-07 NOTE — PLAN OF CARE
Discharge education given to patient and daughter. Patient unable to teach back plan however daughter is aware. Flakita Johnston knows to follow up with  and GI. Medication changes reviewed. [facility] to connect with [facility]. No changes in mental status, patient is alert to self only. Hearing aids and glasses in place. Patient does not have belonings for return upon discharge. Wound care reviewed with Flakita Johnston who understands dressing change. Patient taken via ambulance to [facility]'s home in [place]. Problem: Patient Centered Care  Goal: Patient preferences are identified and integrated in the patient's plan of care  Description: Interventions:  - What would you like us to know as we care for you?  I live with my dtr Flakita Johnston  - Provide timely, complete, and accurate information to patient/family  - Incorporate patient and family knowledge, values, beliefs, and cultural backgrounds into the planning and delivery of care  - Encourage patient/family to participate in care and decision-making at the level they choose  - Honor patient and family perspectives and choices  Outcome: Completed     Problem: Patient/Family Goals  Goal: Patient/Family Long Term Goal  Description: Patient's Long Term Goal: keep up my strength    Interventions:  - up to chair or ambulate as tolerated  - Pt/Ot eval  - See additional Care Plan goals for specific interventions  Outcome: Completed  Goal: Patient/Family Short Term Goal  Description: Patient's Short Term Goal: less abdominal pain    Interventions:   - clear liq diet, advanced to full liq, advance as per GI orders, document intake  - IV fluids during GI rest  - prn pain meds as needed  - See additional Care Plan goals for specific interventions  Outcome: Completed     Problem: SKIN/TISSUE INTEGRITY - ADULT  Goal: Skin integrity remains intact  Description: INTERVENTIONS  - Assess and document risk factors for pressure ulcer development  - Assess and document skin integrity  - Monitor for areas of redness and/or skin breakdown  - Initiate interventions, skin care algorithm/standards of care as needed  Outcome: Completed     Problem: PAIN - ADULT  Goal: Verbalizes/displays adequate comfort level or patient's stated pain goal  Description: INTERVENTIONS:  - Encourage pt to monitor pain and request assistance  - Assess pain using appropriate pain scale  - Administer analgesics based on type and severity of pain and evaluate response  - Implement non-pharmacological measures as appropriate and evaluate response  - Consider cultural and social influences on pain and pain management  - Manage/alleviate anxiety  - Utilize distraction and/or relaxation techniques  - Monitor for opioid side effects  - Notify MD/LIP if interventions unsuccessful or patient reports new pain  - Anticipate increased pain with activity and pre-medicate as appropriate  Outcome: Completed     Problem: SAFETY ADULT - FALL  Goal: Free from fall injury  Description: INTERVENTIONS:  - Assess pt frequently for physical needs  - Identify cognitive and physical deficits and behaviors that affect risk of falls.   - [place] fall precautions as indicated by assessment.  - Educate pt/family on patient safety including physical limitations  - Instruct pt to call for assistance with activity based on assessment  - Modify environment to reduce risk of injury  - Provide assistive devices as appropriate  - Consider OT/PT consult to assist with strengthening/mobility  - Encourage toileting schedule  Outcome: Completed     Problem: GASTROINTESTINAL - ADULT  Goal: Maintains or returns to baseline bowel function  Description: INTERVENTIONS:  - Assess bowel function  - Maintain adequate hydration with IV or PO as ordered and tolerated  - Evaluate effectiveness of GI medications  - Encourage mobilization and activity  - Obtain nutritional consult as needed  - Establish a toileting routine/schedule  - Consider collaborating with pharmacy to review patient's medication profile  Outcome: Completed  Goal: Maintains adequate nutritional intake (undernourished)  Description: INTERVENTIONS:  - Monitor percentage of each meal consumed  - Identify factors contributing to decreased intake, treat as appropriate  - Assist with meals as needed  - Monitor I&O, WT and lab values  - Obtain nutritional consult as needed  - Optimize oral hygiene and moisture  - Encourage food from home; allow for food preferences  - Enhance eating environment  Outcome: Completed

## 2023-06-08 ENCOUNTER — TELEPHONE (OUTPATIENT)
Dept: INTERNAL MEDICINE CLINIC | Facility: CLINIC | Age: 88
End: 2023-06-08

## 2023-06-08 ENCOUNTER — PATIENT OUTREACH (OUTPATIENT)
Dept: CASE MANAGEMENT | Age: 88
End: 2023-06-08

## 2023-06-08 DIAGNOSIS — E87.6 HYPOKALEMIA: Primary | ICD-10-CM

## 2023-06-08 DIAGNOSIS — Z02.9 ENCOUNTERS FOR UNSPECIFIED ADMINISTRATIVE PURPOSE: ICD-10-CM

## 2023-06-08 PROCEDURE — 1111F DSCHRG MED/CURRENT MED MERGE: CPT

## 2023-06-08 NOTE — TELEPHONE ENCOUNTER
Areli Canales from [place] [address] states she started the patients home care today (6/8/2023). She states the patients daughter Fredy Mojica was inquiring about additional lab work for the patient. Areli Canales states she did not see those orders and she was wondering if  wants the additional orders to placed as they can do it on their end and have their phlebotomist do it.

## 2023-06-09 NOTE — TELEPHONE ENCOUNTER
Dr. Tiffanie Galeano, could you please clarify? An extra potassium tablet x 1, or an extra tablet daily? BMP pended for signature. Do you want to use hypokalemia as a diagnosis?

## 2023-06-09 NOTE — TELEPHONE ENCOUNTER
Spoke to [place], daughter, (on MICKEY, verified patient's name and ). She has concerns that patient's potassium is low and is asking for blood work and potassium supplement (beyond the 20 mEq that he currently takes). She said that patient is very weak like, \"a noodle. \" She said that he did not call her overnight to help him to the bathroom and the bed was soaked when she went to check on him this morning. She is concerned that his weakness has worsened since yesterday when home health was there. Patient has potassium supplementation in the hospital and it went up to 4.6 on  but was down to 3.7 on . Dr. Rufus Bautista, daughter is trying to think of anything that will help patient. She is unable to bring him for lab work because he is so weak but is asking if extra potassium supplementation might be helpful. Please advise.

## 2023-06-10 NOTE — TELEPHONE ENCOUNTER
Advised Daughter Ros(on hipaa) of Dr. Axel Hinds note. Daughter indicated that gave patient an extra potassium pill yesterday. Patient is too weak to go to lab but did not feel he needed to go to the ER. Stated that home health nurse mentioned that a phlebotimist could go out to patient to draw blood. Advised daughter that would call home health to do that today. Left detailed message on Melinda Roth RN's voicemail with Dr Axel Hinds note and if could have someone go out to patient to draw blood and to call back-transfer to triage. Spoke to residential home health service and would have someone call back to go out to patient today.

## 2023-06-10 NOTE — TELEPHONE ENCOUNTER
Trista Ontiveros from [facility] calls and states that the lab is closed today. Is it ok to get BMP drawn on Monday, or would you like it done STAT. They can send someone to get blood drawn today if STAT. Please advise. Thanks. Call Trista Ontiveros RN back at [phone] with response.

## 2023-06-11 NOTE — TELEPHONE ENCOUNTER
"Chief Complaint  Heartburn (f/u GERD ) and Insomnia (f/u insomnia)    Subjective          Sakina HURST presents to [facility]  History of Present Illness has been doing well since last visit.  Was evaluated by cardiology for palpitations and started BB which she is afshin well after some initial dizziness which seems to be improving.     Will try to wean off PPI-has seemed to get her reflux under good control.     Insomnia is noted only when she travels to [facility] which she does monthly.  She would like to continue her ambien which she has been taking for long time intermittently without side effects.  Does not want to try any alternatives.      Anxiety and depression are controlled well on her current paxil, no side effects.  Would like to continue.  PHQ-9 Total Score: 0  SUSAN 7 Total Score: 0        Objective   Vital Signs:   /65   Pulse 73   Temp 97.3 °F (36.3 °C)   Ht 170.2 cm (67\")   Wt 72.9 kg (160 lb 11.2 oz)   SpO2 97%   BMI 25.17 kg/m²     Physical Exam  Vitals and nursing note reviewed.   Constitutional:       General: She is not in acute distress.     Appearance: She is well-developed. She is not ill-appearing or diaphoretic.   HENT:      Head: Normocephalic and atraumatic.   Eyes:      General:         Right eye: No discharge.         Left eye: No discharge.      Conjunctiva/sclera: Conjunctivae normal.   Cardiovascular:      Rate and Rhythm: Normal rate and regular rhythm.   Pulmonary:      Effort: Pulmonary effort is normal.      Breath sounds: Normal breath sounds.   Abdominal:      General: Bowel sounds are normal.      Palpations: Abdomen is soft.      Tenderness: There is no abdominal tenderness.   Musculoskeletal:         General: No deformity.      Cervical back: Neck supple.      Comments: Gait smooth and steady   Lymphadenopathy:      Cervical: No cervical adenopathy.   Skin:     General: Skin is warm and dry.   Neurological:      General: No focal deficit " Monday is ok      Cristal Villa present.      Mental Status: She is alert and oriented to person, place, and time.   Psychiatric:         Mood and Affect: Mood normal.         Behavior: Behavior normal.        Result Review :   The following data was reviewed by: RANDA Beyer on 07/13/2021:    Data reviewed: Cardiology studies recent cardiology note, echo          Assessment and Plan    Diagnoses and all orders for this visit:    1. Psychophysiological insomnia (Primary)  -     TSH Rfx On Abnormal To Free T4  -     zolpidem (AMBIEN) 5 MG tablet; Take 1 tablet by mouth At Night As Needed for Sleep.  Dispense: 30 tablet; Refill: 2    2. Gastroesophageal reflux disease without esophagitis  -     esomeprazole (nexIUM) 40 MG capsule; Take 1 capsule by mouth Every Night.  Dispense: 90 capsule; Refill: 0  -     CBC & Differential  -     Comprehensive Metabolic Panel    3. Screening, lipid  -     Lipid Panel With LDL / HDL Ratio    4. High risk medication use  -     Pain Management Profile (13 Drugs) Urine - Urine, Clean Catch    5. Generalized anxiety disorder      She is here for refill on ambien which she has been on long term. Takes intermittently.  Kimani reviewed and appropriate.  Controlled substance agreement is UTD.  Will continue periodic ambien.  Cautions and side effects discussed.  Recommend strongly other medications which pt declines. Needs UDS today.     GERD:  Has improved with PPI.  Will have her start to gradually wean and if she does not tolerate wean she will need to f/u.      Anxiety and depression are controlled with current paxil.  Will continue.     Has not had labs recently and will get these today, screen lipids.          Follow Up   Return in about 3 months (around 10/13/2021).  Patient was given instructions and counseling regarding her condition or for health maintenance advice. Please see specific information pulled into the AVS if appropriate.

## 2023-06-19 ENCOUNTER — HOME HEALTH CHARGES (OUTPATIENT)
Dept: INTERNAL MEDICINE CLINIC | Facility: CLINIC | Age: 88
End: 2023-06-19

## 2023-06-19 ENCOUNTER — OFFICE VISIT (OUTPATIENT)
Dept: INTERNAL MEDICINE CLINIC | Facility: CLINIC | Age: 88
End: 2023-06-19

## 2023-06-19 VITALS
RESPIRATION RATE: 14 BRPM | DIASTOLIC BLOOD PRESSURE: 73 MMHG | HEART RATE: 58 BPM | HEIGHT: 72 IN | BODY MASS INDEX: 24 KG/M2 | OXYGEN SATURATION: 98 % | SYSTOLIC BLOOD PRESSURE: 155 MMHG

## 2023-06-19 DIAGNOSIS — M17.12 UNILATERAL PRIMARY OSTEOARTHRITIS, LEFT KNEE: ICD-10-CM

## 2023-06-19 DIAGNOSIS — F01.50 VASCULAR DEMENTIA, UNSPECIFIED DEMENTIA SEVERITY, UNSPECIFIED WHETHER BEHAVIORAL, PSYCHOTIC, OR MOOD DISTURBANCE OR ANXIETY (HCC): ICD-10-CM

## 2023-06-19 DIAGNOSIS — I48.20 CHRONIC ATRIAL FIBRILLATION (HCC): ICD-10-CM

## 2023-06-19 DIAGNOSIS — K85.90 ACUTE PANCREATITIS, UNSPECIFIED COMPLICATION STATUS, UNSPECIFIED PANCREATITIS TYPE: Primary | ICD-10-CM

## 2023-06-19 DIAGNOSIS — K85.90 ACUTE PANCREATITIS WITHOUT INFECTION OR NECROSIS, UNSPECIFIED PANCREATITIS TYPE: Primary | ICD-10-CM

## 2023-06-19 NOTE — PROGRESS NOTES
[facility]  Certification Period 6/8/23 to 8/6/23   Caridad Armendariz  Provider no.

## 2023-07-06 ENCOUNTER — LAB ENCOUNTER (OUTPATIENT)
Dept: LAB | Age: 88
End: 2023-07-06
Attending: INTERNAL MEDICINE
Payer: MEDICARE

## 2023-07-06 DIAGNOSIS — E87.6 HYPOKALEMIA: ICD-10-CM

## 2023-07-06 LAB
ANION GAP SERPL CALC-SCNC: 4 MMOL/L (ref 0–18)
BUN BLD-MCNC: 18 MG/DL (ref 7–18)
BUN/CREAT SERPL: 17.5 (ref 10–20)
CALCIUM BLD-MCNC: 9.4 MG/DL (ref 8.5–10.1)
CHLORIDE SERPL-SCNC: 99 MMOL/L (ref 98–112)
CO2 SERPL-SCNC: 29 MMOL/L (ref 21–32)
CREAT BLD-MCNC: 1.03 MG/DL
FASTING STATUS PATIENT QL REPORTED: NO
GFR SERPLBLD BASED ON 1.73 SQ M-ARVRAT: 66 ML/MIN/1.73M2 (ref 60–?)
GLUCOSE BLD-MCNC: 136 MG/DL (ref 70–99)
OSMOLALITY SERPL CALC.SUM OF ELEC: 278 MOSM/KG (ref 275–295)
POTASSIUM SERPL-SCNC: 4.4 MMOL/L (ref 3.5–5.1)
SODIUM SERPL-SCNC: 132 MMOL/L (ref 136–145)

## 2023-07-06 PROCEDURE — 80048 BASIC METABOLIC PNL TOTAL CA: CPT

## 2023-07-06 PROCEDURE — 36415 COLL VENOUS BLD VENIPUNCTURE: CPT

## 2023-07-07 ENCOUNTER — TELEPHONE (OUTPATIENT)
Dept: INTERNAL MEDICINE CLINIC | Facility: CLINIC | Age: 88
End: 2023-07-07

## 2023-07-07 NOTE — TELEPHONE ENCOUNTER
Trudy Gautam with residential home health is reporting that patient was bradycardic with 50bpm. Per RN, patient was asymptomatic. Patient has one RN visit let.  Patient will be discharged the week of 7/21

## 2023-08-03 NOTE — CONSULTS
[facility]  Report of Consultation    Edison Becker Patient Status:  Inpatient    1926 MRN [id number]   Location [facility] 5SW/SE Attending Chris Trevino MD   Hosp Day # 2 PCP Phuong Harkins MD     Reason for Consultation:  - Natty Galaviz been on the gastrointestinal care of Dr. Shay Camarillo. The patient reportedly does feel better, since yesterday he has more strength and energy and can get to the bathroom without feeling weak or dizzy.   Infectious disease is on the case and following drugs.     Allergies:  No Known Allergies    Medications:    Current Facility-Administered Medications:   •  tamsulosin HCl (FLOMAX) cap 0.4 mg, 0.4 mg, Oral, Nightly  •  Pantoprazole Sodium (PROTONIX) EC tab 40 mg, 40 mg, Oral, QAM AC  •  Piperacillin Sod- Proteinuria     Essential hypertension with goal blood pressure less than 140/90     Need for vaccination     Trochanteric bursitis of right hip     Primary osteoarthritis of right hip     Actinic keratosis     Neoplasm of uncertain behavior of skin     Pe input. However since he does seem to be improving clinically we could watch the patient, trend his liver function tests and consider reimaging with ultrasound and/or MRCP to evaluate for choledocholithiasis or other issue.     Patient has a history of cirr Hydroxyzine Pregnancy And Lactation Text: This medication is not safe during pregnancy and should not be taken. It is also excreted in breast milk and breast feeding isn't recommended.

## 2023-08-08 ENCOUNTER — OFFICE VISIT (OUTPATIENT)
Dept: PODIATRY CLINIC | Facility: CLINIC | Age: 88
End: 2023-08-08

## 2023-08-08 ENCOUNTER — LAB ENCOUNTER (OUTPATIENT)
Dept: LAB | Age: 88
End: 2023-08-08
Attending: INTERNAL MEDICINE
Payer: MEDICARE

## 2023-08-08 DIAGNOSIS — R26.81 GAIT INSTABILITY: ICD-10-CM

## 2023-08-08 DIAGNOSIS — B35.1 ONYCHOMYCOSIS: Primary | ICD-10-CM

## 2023-08-08 DIAGNOSIS — K85.90 ACUTE PANCREATITIS, UNSPECIFIED COMPLICATION STATUS, UNSPECIFIED PANCREATITIS TYPE: ICD-10-CM

## 2023-08-08 LAB
ANION GAP SERPL CALC-SCNC: 8 MMOL/L (ref 0–18)
BUN BLD-MCNC: 18 MG/DL (ref 7–18)
BUN/CREAT SERPL: 15.8 (ref 10–20)
CALCIUM BLD-MCNC: 9.4 MG/DL (ref 8.5–10.1)
CHLORIDE SERPL-SCNC: 99 MMOL/L (ref 98–112)
CO2 SERPL-SCNC: 29 MMOL/L (ref 21–32)
CREAT BLD-MCNC: 1.14 MG/DL
EGFRCR SERPLBLD CKD-EPI 2021: 58 ML/MIN/1.73M2 (ref 60–?)
FASTING STATUS PATIENT QL REPORTED: NO
GLUCOSE BLD-MCNC: 125 MG/DL (ref 70–99)
OSMOLALITY SERPL CALC.SUM OF ELEC: 285 MOSM/KG (ref 275–295)
POTASSIUM SERPL-SCNC: 4.2 MMOL/L (ref 3.5–5.1)
SODIUM SERPL-SCNC: 136 MMOL/L (ref 136–145)

## 2023-08-08 PROCEDURE — 99213 OFFICE O/P EST LOW 20 MIN: CPT | Performed by: PODIATRIST

## 2023-08-08 PROCEDURE — 1126F AMNT PAIN NOTED NONE PRSNT: CPT | Performed by: PODIATRIST

## 2023-08-08 PROCEDURE — 80048 BASIC METABOLIC PNL TOTAL CA: CPT

## 2023-08-08 PROCEDURE — 36415 COLL VENOUS BLD VENIPUNCTURE: CPT

## 2023-08-08 NOTE — PROGRESS NOTES
[address] [facility] Podiatry  Progress Note    Candice Ramirez is a 80year old male. Patient presents with:  Toenail Care: 3 mo f/u - here with his daughter - has no c/o regarding his feet         HPI:     This is a pleasant male with afib, CAD, lumbar stenosis, vascular dementia, alocholic cirrhosis of liver, gout on allopurinol. He is on apixaban. He presents to clinic today with his daughter. He does use a walker for short distances. He presents to clinic today for routine foot care. Allergies: Patient has no known allergies. Current Outpatient Medications   Medication Sig Dispense Refill    polyethylene glycol, PEG 3350, 17 g Oral Powd Pack Take 17 g by mouth as needed (constipation). omeprazole 20 MG Oral Capsule Delayed Release Take 1 capsule (20 mg total) by mouth before breakfast. 90 capsule 3    Potassium Chloride ER 20 MEQ Oral Tab CR Take 1 tablet by mouth every morning. 90 tablet 3    allopurinol 300 MG Oral Tab Take 1 tablet (300 mg total) by mouth daily. 90 tablet 3    tamsulosin 0.4 MG Oral Cap Take 1 capsule (0.4 mg total) by mouth daily. (Patient taking differently: Take 1 capsule (0.4 mg total) by mouth nightly.) 90 capsule 3    Lovastatin 40 MG Oral Tab Take 1 tablet (40 mg total) by mouth nightly. 90 tablet 3    bumetanide 0.5 MG Oral Tab Take 1 tablet (0.5 mg total) by mouth 2 (two) times daily. amLODIPine Besylate 2.5 MG Oral Tab Take 1 tablet (2.5 mg total) by mouth daily as needed. Only take if SBP is above 150      apixaban 5 MG Oral Tab Take 1 tablet (5 mg total) by mouth 2 (two) times daily. 60 tablet 11    Multiple Vitamins-Minerals (CENTRUM SILVER ULTRA MENS) Oral Tab Take 1 tablet by mouth daily.           Past Medical History:   Diagnosis Date    Acute bilateral low back pain without sciatica 8/25/2022    Acute gastric ulcer without hemorrhage or perforation     Acute on chronic pancreatitis ([facility] [address] 9/21/2021    Aspiration pneumonia ([facility] [address] 11/11/2020    Atrial fibrillation ([address]     BCC (basal cell carcinoma) 2019    right ear pinna    Bilateral inguinal hernia     Cancer ([address] 2010, 2019, 2020    partial nephrectomy/skin cancers    Cancer of kidney ([address] 2005    Carotid bruit 4/16/2019    Cholecystitis 2013    Cirrhosis, cryptogenic ([address] 2013    Essential hypertension     controlled w/medication    Heart disease     aortic stenosis; Afib, coronary artery disease    Hemarthrosis of left knee 6/4/2019    High blood pressure     High cholesterol     Hip arthritis 2000    Hyperlipidemia     Keratoacanthoma type squamous cell carcinoma of skin 03/2020    L forearm    Mixed hyperlipidemia 9/14/2015    Pancreatitis 07/23/2021    Persistent atrial fibrillation ([address] 3/28/2019    SCC (squamous cell carcinoma) 2019    right forehead - SCC in situ    SCC (squamous cell carcinoma) 05/2020    L forearm x 2    Skin lesion of right arm 2/18/2020    Stenosis of aorta     per daughter    Viral upper respiratory tract infection 12/5/2022      Past Surgical History:   Procedure Laterality Date    BIOPSY OF SKIN LESION Left 03/05/2020    Forearm    BIOPSY OF SKIN LESION Left 05/21/2020    Forearm x 2    CARPAL TUNNEL RELEASE Right 2009    COLONOSCOPY  2000    EXC SKIN MALIG 2.1-3CM FACE,FACIAL Right 02/12/2020    HIP REPLACEMENT SURGERY Left 01/05/2000    LAMINECTOMY  2000    LAPAROSCOPY, SURGICAL; PARTIAL NEPHRECTOMY Right     REPR CMPL WND HEAD,FAC,HAND 2.6-7.5 Right 02/12/2020    REPR CMPL WND LID,NOS,EAR 2.5-7.5 Right 02/12/2020      Family History   Problem Relation Age of Onset    Heart Attack Mother         CHF/CRF (cause of death)    Diabetes Mother     Hypertension Mother     Heart Disease Sister         CAD (cause of death)    Heart Attack Brother         CHF (cause of death)    Cancer Brother 67        stomach     Lipids Other         family h/o hyperlipidemia and vascular disease      Social History    Socioeconomic History      Marital status:        Number of children: 3    Occupational History      Occupation: Director/manager [employer]        Comment: [employer], retired    Tobacco Use      Smoking status: Former        Packs/day: 0.50        Years: 30.00        Pack years: 15        Types: Cigarettes        Quit date: 1987        Years since quittin.0      Smokeless tobacco: Never      Tobacco comments: light smoker in his 25s, weaned himself to 1 per day till  quit    Vaping Use      Vaping Use: Never used    Substance and Sexual Activity      Alcohol use: No      Drug use: No      Sexual activity: Not Currently    Other Topics      Concerns:        Caffeine Concern: Yes          coffee, 2 cups        Reaction to local anesthetic: No          REVIEW OF SYSTEMS:   Denies nausea, fever, chills  No calf pain  No other muscle or joint aches  Denies chest pain or shortness of breath. EXAM:   There were no vitals taken for this visit. Constitutional:   Patient in no apparent distress. Well kept. Of normal body habitus. Alert and oriented to person, place, and time. Vascular Examination:  DP pulse is NP  PT pulse is NP  Capillary refill is adequate  Edema is present bilateral  Varicosities present bilateral  Temperature warm proximally to warm distally bilateral  Integumentary Examination:   The patient's nails appear incurvated, thickened, elongated, dystrophic, discolored with subungual debris 1-5 right, 1-5  left nails. Right 3rd toe lateral nail border is incurvated with no SOI    Digital hair growth is absent  Skin is of diminished texture and decreased turgor. Neurological Examination:  Sharp/dull is present to right and is present to left. Parasthesias absent.   Musculoskeletal Examination:  B/L pedal muscle strength diminished          LABS & IMAGING:     Lab Results   Component Value Date     (H) 2023    BUN 18 2023    CREATSERUM 1.03 2023    BUNCREA 17.5 2023    ANIONGAP 4 2023    GFRAA 59 (L) 05/26/2022    GFRNAA 51 (L) 05/26/2022    CA 9.4 07/06/2023     (L) 07/06/2023    K 4.4 07/06/2023    CL 99 07/06/2023    CO2 29.0 07/06/2023    OSMOCALC 278 07/06/2023        No results found for: EAG, A1C     No results found. ASSESSMENT AND PLAN:   Diagnoses and all orders for this visit:    Onychomycosis    Gait instability          Plan:     Reviewed treatment options for nail dystrophy / onychomycosis including:   No treatment and monitoring, topical meds, oral meds, nail removal   Advantages and disadvantages of each reviewed. Using oral agents would require regular blood test monitoring due to risk of hepatotoxicity and other adverse reactions including drug interactions. Topical meds are much safer yet carry very low efficacy. Pt has opted for debridement of nails and monitoring. Evaluated the patient. Discussed treatment options with the patient. Discussed with patient proper care and hygiene for their feet. Patient tolerated procedures well, without incident. Instrumentation used includes nail nippers and electric  where appropriate. Procedure: (15702 Debridement of toenails 6-10) Surgically debrided and mechanically reduced 6 or more toenails. Hemmorhage occurred none. Nails that were debrided appeared dystrophic and caused the patient pain in shoe gear. Nails 5 Left & 5 Right. RTC 3 months for nail care. No follow-ups on file.     Tung Corado DPM  8/8/23

## 2023-08-18 ENCOUNTER — NURSE TRIAGE (OUTPATIENT)
Dept: INTERNAL MEDICINE CLINIC | Facility: CLINIC | Age: 88
End: 2023-08-18

## 2023-08-18 NOTE — TELEPHONE ENCOUNTER
Action Requested: Summary for Provider     []  Critical Lab, Recommendations Needed  [] Need Additional Advice  [x]   FYI    []   Need Orders  [] Need Medications Sent to Pharmacy  []  Other     SUMMARY: Kiki Walters daughter (on HIPAA) indicated that patient had an acute pancreatitis attack back in June 2023 and was hospitalized. Took a while for him to bounce back since was just laying in bed at the hospital and not walking around. Was doing physical therapy at home with home health after that. On 8/16/2023 at night around 11pm patient was having abdominal pain and vomited once. Laid down on the left side and eventually fell asleep around 12:30am. Yesterday daughter was pushing fluids-drank about 64 ounces of fluids yesterday and was easy on the food he ate. This morning patient is weak and wobbly. Has alittle dementia but more confused than normal this morning. Noticed that urinating but urine output is decreased and dark. No fevers. Still has tenderness in the abdomen but not as bad as on 8/16/2023. No other symptoms. Daughter trying to avoid taking him to the ER because if admitted afraid it will take him a long time to recover from it. Advised daughter to push fluids this morning but if still the same then needs to go to the ER for an evaluation and IV fluids. Daughter agreed.      Reason for call: Abdominal Pain (Abdominal pain, back pain, confusion, weakness, dark urine, decreased urine output)  Onset: Aug 16, 2023  Reason for Disposition   Constant abdominal pain lasting > 2 hours    Protocols used: Abdominal Pain - Male-A-[place]

## 2023-08-19 NOTE — TELEPHONE ENCOUNTER
Spoke to patient's daughter Juliet Perez (on MICKEY), verified Name and . She has not taken the patient to the ED. She states she has been through episodes of this with him and wants to see if this can be managed at home for now. She states she has been giving the patient easy to digest food for nourishment. She is also trying to get him rehydrated with Pedialyte Advanced Care Plus and so far has she has seen improvement in color of urine. She wants to give the patient time to recover at home. She is aware that any worsening, she will have to bring the patient to ED.

## 2023-08-28 ENCOUNTER — OFFICE VISIT (OUTPATIENT)
Dept: INTERNAL MEDICINE CLINIC | Facility: CLINIC | Age: 88
End: 2023-08-28

## 2023-08-28 VITALS
WEIGHT: 170 LBS | OXYGEN SATURATION: 98 % | SYSTOLIC BLOOD PRESSURE: 122 MMHG | HEART RATE: 56 BPM | RESPIRATION RATE: 14 BRPM | HEIGHT: 72 IN | BODY MASS INDEX: 23.03 KG/M2 | DIASTOLIC BLOOD PRESSURE: 64 MMHG

## 2023-08-28 DIAGNOSIS — E78.00 HYPERCHOLESTEROLEMIA: ICD-10-CM

## 2023-08-28 DIAGNOSIS — I10 HYPERTENSION, UNSPECIFIED TYPE: Primary | ICD-10-CM

## 2023-08-28 DIAGNOSIS — K86.1 CHRONIC PANCREATITIS, UNSPECIFIED PANCREATITIS TYPE (HCC): ICD-10-CM

## 2023-08-28 DIAGNOSIS — I48.20 CHRONIC ATRIAL FIBRILLATION (HCC): ICD-10-CM

## 2023-08-28 PROCEDURE — 1126F AMNT PAIN NOTED NONE PRSNT: CPT | Performed by: INTERNAL MEDICINE

## 2023-08-28 PROCEDURE — 99214 OFFICE O/P EST MOD 30 MIN: CPT | Performed by: INTERNAL MEDICINE

## 2023-09-12 ENCOUNTER — LAB ENCOUNTER (OUTPATIENT)
Dept: LAB | Age: 88
End: 2023-09-12
Attending: INTERNAL MEDICINE
Payer: MEDICARE

## 2023-09-12 DIAGNOSIS — E78.00 HYPERCHOLESTEROLEMIA: ICD-10-CM

## 2023-09-12 LAB
CHOLEST SERPL-MCNC: 127 MG/DL (ref ?–200)
FASTING PATIENT LIPID ANSWER: NO
HDLC SERPL-MCNC: 54 MG/DL (ref 40–59)
LDLC SERPL CALC-MCNC: 58 MG/DL (ref ?–100)
NONHDLC SERPL-MCNC: 73 MG/DL (ref ?–130)
TRIGL SERPL-MCNC: 75 MG/DL (ref 30–149)
VLDLC SERPL CALC-MCNC: 11 MG/DL (ref 0–30)

## 2023-09-12 PROCEDURE — 80061 LIPID PANEL: CPT

## 2023-09-12 PROCEDURE — 36415 COLL VENOUS BLD VENIPUNCTURE: CPT

## 2023-10-10 ENCOUNTER — OFFICE VISIT (OUTPATIENT)
Dept: PODIATRY CLINIC | Facility: CLINIC | Age: 88
End: 2023-10-10

## 2023-10-10 DIAGNOSIS — L60.0 INGROWN TOENAIL OF RIGHT FOOT: ICD-10-CM

## 2023-10-10 DIAGNOSIS — B35.1 ONYCHOMYCOSIS: Primary | ICD-10-CM

## 2023-10-10 DIAGNOSIS — R26.81 GAIT INSTABILITY: ICD-10-CM

## 2023-10-10 PROCEDURE — 99213 OFFICE O/P EST LOW 20 MIN: CPT | Performed by: PODIATRIST

## 2023-10-10 PROCEDURE — 1126F AMNT PAIN NOTED NONE PRSNT: CPT | Performed by: PODIATRIST

## 2023-10-10 NOTE — PROGRESS NOTES
[address] [facility] Podiatry  Progress Note    Sonny Ochoa is a 80year old male. Patient presents with: Toe Pain: Right 3rd toe has pain in one of the edges of the toe for the past few weeks and on the Left 3rd toe he has a corn which is bothersome with walking - rates pain as 4-7/10 on and off with weight bearing         HPI:     This is a pleasant male with afib, CAD, lumbar stenosis, vascular dementia, alocholic cirrhosis of liver, gout on allopurinol. He is on apixaban. He presents to clinic today with his daughter. He does use a walker for short distances. He presents to clinic today for routine foot care. Allergies: Patient has no known allergies. Current Outpatient Medications   Medication Sig Dispense Refill    polyethylene glycol, PEG 3350, 17 g Oral Powd Pack Take 17 g by mouth as needed (constipation). omeprazole 20 MG Oral Capsule Delayed Release Take 1 capsule (20 mg total) by mouth before breakfast. 90 capsule 3    Potassium Chloride ER 20 MEQ Oral Tab CR Take 1 tablet by mouth every morning. 90 tablet 3    allopurinol 300 MG Oral Tab Take 1 tablet (300 mg total) by mouth daily. 90 tablet 3    tamsulosin 0.4 MG Oral Cap Take 1 capsule (0.4 mg total) by mouth daily. (Patient taking differently: Take 1 capsule (0.4 mg total) by mouth nightly.) 90 capsule 3    Lovastatin 40 MG Oral Tab Take 1 tablet (40 mg total) by mouth nightly. 90 tablet 3    bumetanide 0.5 MG Oral Tab Take 1 tablet (0.5 mg total) by mouth 2 (two) times daily. amLODIPine Besylate 2.5 MG Oral Tab Take 1 tablet (2.5 mg total) by mouth daily as needed. Only take if SBP is above 150      apixaban 5 MG Oral Tab Take 1 tablet (5 mg total) by mouth 2 (two) times daily. 60 tablet 11    Multiple Vitamins-Minerals (CENTRUM SILVER ULTRA MENS) Oral Tab Take 1 tablet by mouth daily.           Past Medical History:   Diagnosis Date    Acute bilateral low back pain without sciatica 8/25/2022    Acute gastric ulcer without hemorrhage or perforation     Acute on chronic pancreatitis ([address] 9/21/2021    Aspiration pneumonia ([address] 11/11/2020    Atrial fibrillation (HCC)     BCC (basal cell carcinoma) 2019    right ear pinna    Bilateral inguinal hernia     Cancer ([address] 2010, 2019, 2020    partial nephrectomy/skin cancers    Cancer of kidney ([address] 2005    Carotid bruit 4/16/2019    Cholecystitis 2013    Cirrhosis, cryptogenic ([address] 2013    Essential hypertension     controlled w/medication    Heart disease     aortic stenosis;  Afib, coronary artery disease    Hemarthrosis of left knee 6/4/2019    High blood pressure     High cholesterol     Hip arthritis 2000    Hyperlipidemia     Keratoacanthoma type squamous cell carcinoma of skin 03/2020    L forearm    Mixed hyperlipidemia 9/14/2015    Pancreatitis 07/23/2021    Persistent atrial fibrillation ([address] 3/28/2019    SCC (squamous cell carcinoma) 2019    right forehead - SCC in situ    SCC (squamous cell carcinoma) 05/2020    L forearm x 2    Skin lesion of right arm 2/18/2020    Stenosis of aorta     per daughter    Viral upper respiratory tract infection 12/5/2022      Past Surgical History:   Procedure Laterality Date    BIOPSY OF SKIN LESION Left 03/05/2020    Forearm    BIOPSY OF SKIN LESION Left 05/21/2020    Forearm x 2    CARPAL TUNNEL RELEASE Right 2009    COLONOSCOPY  2000    EXC SKIN MALIG 2.1-3CM FACE,FACIAL Right 02/12/2020    HIP REPLACEMENT SURGERY Left 01/05/2000    LAMINECTOMY  2000    LAPAROSCOPY, SURGICAL; PARTIAL NEPHRECTOMY Right     REPR CMPL WND HEAD,FAC,HAND 2.6-7.5 Right 02/12/2020    REPR CMPL WND LID,NOS,EAR 2.5-7.5 Right 02/12/2020      Family History   Problem Relation Age of Onset    Heart Attack Mother         CHF/CRF (cause of death)    Diabetes Mother     Hypertension Mother     Heart Disease Sister         CAD (cause of death)    Heart Attack Brother         CHF (cause of death)    Cancer Brother 67        stomach     Lipids Other         family h/o hyperlipidemia and vascular disease      Social History    Socioeconomic History      Marital status:       Number of children: 3    Occupational History      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        Comment: [employer], retired    Tobacco Use      Smoking status: Former        Packs/day: 0.50        Years: 30.00        Additional pack years: 0.00        Total pack years: 15.00        Types: Cigarettes        Quit date: 1987        Years since quittin.2      Smokeless tobacco: Never      Tobacco comments: light smoker in his 25s, weaned himself to 1 per day till  quit    Vaping Use      Vaping Use: Never used    Substance and Sexual Activity      Alcohol use: No      Drug use: No      Sexual activity: Not Currently    Other Topics      Concerns:        Caffeine Concern: Yes          coffee, 2 cups        Reaction to local anesthetic: No          REVIEW OF SYSTEMS:   Denies nausea, fever, chills  No calf pain  No other muscle or joint aches  Denies chest pain or shortness of breath. EXAM:   There were no vitals taken for this visit. Constitutional:   Patient in no apparent distress. Well kept. Of normal body habitus. Alert and oriented to person, place, and time. Vascular Examination:  DP pulse is NP  PT pulse is NP  Capillary refill is adequate  Edema is present bilateral  Varicosities present bilateral  Temperature warm proximally to warm distally bilateral  Integumentary Examination:   The patient's nails appear incurvated, thickened, elongated, dystrophic, discolored with subungual debris 1-5 right, 1-5  left nails. Right 3rd toe lateral nail border is incurvated with no SOI    Digital hair growth is absent  Skin is of diminished texture and decreased turgor. Neurological Examination:  Sharp/dull is present to right and is present to left. Parasthesias absent.   Musculoskeletal Examination:  B/L pedal muscle strength diminished          LABS & IMAGING:     Lab Results   Component Value Date     (H) 08/08/2023    BUN 18 08/08/2023    CREATSERUM 1.14 08/08/2023    BUNCREA 15.8 08/08/2023    ANIONGAP 8 08/08/2023    GFRAA 59 (L) 05/26/2022    GFRNAA 51 (L) 05/26/2022    CA 9.4 08/08/2023     08/08/2023    K 4.2 08/08/2023    CL 99 08/08/2023    CO2 29.0 08/08/2023    OSMOCALC 285 08/08/2023        No results found for: \"EAG\", \"A1C\"     No results found. ASSESSMENT AND PLAN:   Diagnoses and all orders for this visit:    Onychomycosis    Gait instability    Ingrown toenail of right foot            Plan:     Reviewed treatment options for nail dystrophy / onychomycosis including:   No treatment and monitoring, topical meds, oral meds, nail removal   Advantages and disadvantages of each reviewed. Using oral agents would require regular blood test monitoring due to risk of hepatotoxicity and other adverse reactions including drug interactions. Topical meds are much safer yet carry very low efficacy. Pt has opted for debridement of nails and monitoring. Evaluated the patient. Discussed treatment options with the patient. Discussed with patient proper care and hygiene for their feet. Patient tolerated procedures well, without incident. Instrumentation used includes nail nippers and electric  where appropriate. Procedure: (74506 Debridement of toenails 6-10) Surgically debrided and mechanically reduced 6 or more toenails. Hemmorhage occurred none. Nails that were debrided appeared dystrophic and caused the patient pain in shoe gear. Nails 5 Left & 5 Right. RTC 3 months for nail care. No follow-ups on file.     uYe Garrison DPM  10/10/23

## 2023-10-16 ENCOUNTER — MED REC SCAN ONLY (OUTPATIENT)
Dept: INTERNAL MEDICINE CLINIC | Facility: CLINIC | Age: 88
End: 2023-10-16

## 2023-10-18 ENCOUNTER — TELEPHONE (OUTPATIENT)
Dept: PODIATRY CLINIC | Facility: CLINIC | Age: 88
End: 2023-10-18

## 2023-10-18 NOTE — TELEPHONE ENCOUNTER
Pt's daughter called requesting to speak to the nurse only. Something was not done at the appointment on 10-10-23. Declined to schedule appointment.   Call

## 2023-10-18 NOTE — TELEPHONE ENCOUNTER
S/w pt daughter(MICKEY on file) and she states at his 10/10 visit pt had nails trimmed, but pt's main concern was the right 3rd toe and that the nail curls and although Dr Isaiah Becerril trimmed the corner of the ingrown nail she is upset that the ingrown wasn't removed the same way he did at pt's 5/31 appt. Pt continued to have pain the same day after the appt on 10/10 in the nail. Pt daughter requesting to come in sooner than next available to have this concern addressed as it is causing pt pain. She denies any drainage, but has some redness along the edge of the nail. Please advise.

## 2023-10-19 ENCOUNTER — APPOINTMENT (OUTPATIENT)
Dept: URBAN - METROPOLITAN AREA CLINIC 244 | Age: 88
Setting detail: DERMATOLOGY
End: 2023-10-19

## 2023-10-19 DIAGNOSIS — L57.0 ACTINIC KERATOSIS: ICD-10-CM

## 2023-10-19 DIAGNOSIS — Z85.828 PERSONAL HISTORY OF OTHER MALIGNANT NEOPLASM OF SKIN: ICD-10-CM

## 2023-10-19 DIAGNOSIS — L82.1 OTHER SEBORRHEIC KERATOSIS: ICD-10-CM

## 2023-10-19 DIAGNOSIS — D22 MELANOCYTIC NEVI: ICD-10-CM

## 2023-10-19 DIAGNOSIS — L81.4 OTHER MELANIN HYPERPIGMENTATION: ICD-10-CM

## 2023-10-19 PROBLEM — D22.61 MELANOCYTIC NEVI OF RIGHT UPPER LIMB, INCLUDING SHOULDER: Status: ACTIVE | Noted: 2023-10-19

## 2023-10-19 PROBLEM — D22.5 MELANOCYTIC NEVI OF TRUNK: Status: ACTIVE | Noted: 2023-10-19

## 2023-10-19 PROBLEM — D22.62 MELANOCYTIC NEVI OF LEFT UPPER LIMB, INCLUDING SHOULDER: Status: ACTIVE | Noted: 2023-10-19

## 2023-10-19 PROCEDURE — OTHER LIQUID NITROGEN: OTHER

## 2023-10-19 PROCEDURE — 17000 DESTRUCT PREMALG LESION: CPT

## 2023-10-19 PROCEDURE — 99213 OFFICE O/P EST LOW 20 MIN: CPT | Mod: 25

## 2023-10-19 PROCEDURE — OTHER MIPS QUALITY: OTHER

## 2023-10-19 PROCEDURE — OTHER COUNSELING: OTHER

## 2023-10-19 ASSESSMENT — LOCATION ZONE DERM
LOCATION ZONE: ARM
LOCATION ZONE: EAR
LOCATION ZONE: TRUNK
LOCATION ZONE: FACE

## 2023-10-19 ASSESSMENT — LOCATION DETAILED DESCRIPTION DERM
LOCATION DETAILED: RIGHT MEDIAL TEMPLE
LOCATION DETAILED: RIGHT MEDIAL SUPERIOR CHEST
LOCATION DETAILED: LEFT ANTERIOR DISTAL UPPER ARM
LOCATION DETAILED: UPPER STERNUM
LOCATION DETAILED: RIGHT ANTECUBITAL SKIN
LOCATION DETAILED: LEFT VENTRAL PROXIMAL FOREARM
LOCATION DETAILED: LEFT ANTECUBITAL SKIN
LOCATION DETAILED: RIGHT SUPERIOR LATERAL MALAR CHEEK
LOCATION DETAILED: MIDDLE STERNUM
LOCATION DETAILED: RIGHT ANTERIOR DISTAL UPPER ARM
LOCATION DETAILED: RIGHT CYMBA CONCHA
LOCATION DETAILED: LEFT DISTAL POSTERIOR UPPER ARM

## 2023-10-19 ASSESSMENT — LOCATION SIMPLE DESCRIPTION DERM
LOCATION SIMPLE: CHEST
LOCATION SIMPLE: LEFT FOREARM
LOCATION SIMPLE: RIGHT TEMPLE
LOCATION SIMPLE: RIGHT EAR
LOCATION SIMPLE: RIGHT UPPER ARM
LOCATION SIMPLE: LEFT UPPER ARM
LOCATION SIMPLE: RIGHT CHEEK

## 2023-10-19 NOTE — PROCEDURE: LIQUID NITROGEN
Render Post-Care Instructions In Note?: no
Detail Level: Zone
Post-Care Instructions: I reviewed with the patient in detail post-care instructions. Patient is to wear sunprotection, and avoid picking at any of the treated lesions. Pt may apply Vaseline to crusted or scabbing areas.
Consent: The patient's consent was obtained including but not limited to risks of crusting, scabbing, blistering, scarring, darker or lighter pigmentary change, recurrence, incomplete removal and infection.
Duration Of Freeze Thaw-Cycle (Seconds): 0
Total Number Of Aks Treated: 1

## 2023-10-20 NOTE — TELEPHONE ENCOUNTER
Called pt daughter and offered appt. She cannot do an early appt, he needs afternoon. Scheduled appt on 10/31 at 1pm at [facility]. She had no further concerns.

## 2023-10-26 PROBLEM — M70.62 TROCHANTERIC BURSITIS OF LEFT HIP: Status: ACTIVE | Noted: 2023-10-26

## 2023-10-26 PROBLEM — Z96.642 HISTORY OF TOTAL HIP ARTHROPLASTY, LEFT: Status: ACTIVE | Noted: 2019-02-23

## 2023-10-26 PROBLEM — M25.552 LEFT HIP PAIN: Status: ACTIVE | Noted: 2023-01-01

## 2023-10-26 PROBLEM — M25.552 LEFT HIP PAIN: Status: ACTIVE | Noted: 2023-10-26

## 2023-10-26 PROBLEM — M70.62 TROCHANTERIC BURSITIS OF LEFT HIP: Status: ACTIVE | Noted: 2023-01-01

## 2023-10-30 ENCOUNTER — LAB ENCOUNTER (OUTPATIENT)
Dept: LAB | Age: 88
End: 2023-10-30
Attending: INTERNAL MEDICINE
Payer: MEDICARE

## 2023-10-30 ENCOUNTER — OFFICE VISIT (OUTPATIENT)
Dept: INTERNAL MEDICINE CLINIC | Facility: CLINIC | Age: 88
End: 2023-10-30

## 2023-10-30 VITALS
BODY MASS INDEX: 24.64 KG/M2 | HEIGHT: 71 IN | SYSTOLIC BLOOD PRESSURE: 128 MMHG | DIASTOLIC BLOOD PRESSURE: 70 MMHG | WEIGHT: 176 LBS | OXYGEN SATURATION: 95 % | RESPIRATION RATE: 14 BRPM | HEART RATE: 58 BPM

## 2023-10-30 DIAGNOSIS — C44.219 BASAL CELL CARCINOMA (BCC) OF AURICLE OF LEFT EAR: ICD-10-CM

## 2023-10-30 DIAGNOSIS — H90.3 SENSORINEURAL HEARING LOSS, BILATERAL: ICD-10-CM

## 2023-10-30 DIAGNOSIS — K70.30 ALCOHOLIC CIRRHOSIS OF LIVER WITHOUT ASCITES (HCC): ICD-10-CM

## 2023-10-30 DIAGNOSIS — D69.6 THROMBOCYTOPENIA (HCC): ICD-10-CM

## 2023-10-30 DIAGNOSIS — I10 HYPERTENSION, UNSPECIFIED TYPE: ICD-10-CM

## 2023-10-30 DIAGNOSIS — I35.0 NONRHEUMATIC AORTIC VALVE STENOSIS: ICD-10-CM

## 2023-10-30 DIAGNOSIS — H91.90 HEARING LOSS, UNSPECIFIED HEARING LOSS TYPE, UNSPECIFIED LATERALITY: ICD-10-CM

## 2023-10-30 DIAGNOSIS — M48.062 SPINAL STENOSIS OF LUMBAR REGION WITH NEUROGENIC CLAUDICATION: ICD-10-CM

## 2023-10-30 DIAGNOSIS — L57.0 ACTINIC KERATOSIS: ICD-10-CM

## 2023-10-30 DIAGNOSIS — C44.212 BASAL CELL CARCINOMA (BCC) OF HELIX OF RIGHT EAR: ICD-10-CM

## 2023-10-30 DIAGNOSIS — Z85.828 PERSONAL HISTORY OF SKIN CANCER: ICD-10-CM

## 2023-10-30 DIAGNOSIS — I48.20 CHRONIC ATRIAL FIBRILLATION (HCC): ICD-10-CM

## 2023-10-30 DIAGNOSIS — K86.1 CHRONIC PANCREATITIS, UNSPECIFIED PANCREATITIS TYPE (HCC): ICD-10-CM

## 2023-10-30 DIAGNOSIS — D48.5 NEOPLASM OF UNCERTAIN BEHAVIOR OF SKIN: ICD-10-CM

## 2023-10-30 DIAGNOSIS — I25.119 ATHEROSCLEROSIS OF NATIVE CORONARY ARTERY OF NATIVE HEART WITH ANGINA PECTORIS (HCC): ICD-10-CM

## 2023-10-30 DIAGNOSIS — I50.9 CONGESTIVE HEART FAILURE, UNSPECIFIED HF CHRONICITY, UNSPECIFIED HEART FAILURE TYPE (HCC): ICD-10-CM

## 2023-10-30 DIAGNOSIS — E78.00 HYPERCHOLESTEROLEMIA: ICD-10-CM

## 2023-10-30 DIAGNOSIS — I10 HYPERTENSION, UNSPECIFIED TYPE: Primary | ICD-10-CM

## 2023-10-30 DIAGNOSIS — F01.50 VASCULAR DEMENTIA, UNSPECIFIED DEMENTIA SEVERITY, UNSPECIFIED WHETHER BEHAVIORAL, PSYCHOTIC, OR MOOD DISTURBANCE OR ANXIETY (HCC): ICD-10-CM

## 2023-10-30 DIAGNOSIS — N40.0 BENIGN PROSTATIC HYPERPLASIA WITHOUT LOWER URINARY TRACT SYMPTOMS: ICD-10-CM

## 2023-10-30 PROBLEM — E53.8 VITAMIN B12 DEFICIENCY: Status: RESOLVED | Noted: 2021-05-12 | Resolved: 2023-01-01

## 2023-10-30 PROBLEM — K74.60 CIRRHOSIS OF LIVER WITHOUT ASCITES, UNSPECIFIED HEPATIC CIRRHOSIS TYPE (HCC): Status: RESOLVED | Noted: 2021-07-23 | Resolved: 2023-01-01

## 2023-10-30 PROBLEM — K85.90 ACUTE PANCREATITIS, UNSPECIFIED COMPLICATION STATUS, UNSPECIFIED PANCREATITIS TYPE (HCC): Status: RESOLVED | Noted: 2023-01-01 | Resolved: 2023-01-01

## 2023-10-30 PROBLEM — K85.90 ACUTE PANCREATITIS, UNSPECIFIED COMPLICATION STATUS, UNSPECIFIED PANCREATITIS TYPE (HCC): Status: RESOLVED | Noted: 2023-06-03 | Resolved: 2023-10-30

## 2023-10-30 PROBLEM — K85.90 ACUTE PANCREATITIS, UNSPECIFIED COMPLICATION STATUS, UNSPECIFIED PANCREATITIS TYPE: Status: RESOLVED | Noted: 2023-06-03 | Resolved: 2023-10-30

## 2023-10-30 PROBLEM — E53.8 VITAMIN B12 DEFICIENCY: Status: RESOLVED | Noted: 2021-05-12 | Resolved: 2023-10-30

## 2023-10-30 PROBLEM — M25.552 LEFT HIP PAIN: Status: RESOLVED | Noted: 2023-10-26 | Resolved: 2023-10-30

## 2023-10-30 PROBLEM — K74.60 CIRRHOSIS OF LIVER WITHOUT ASCITES, UNSPECIFIED HEPATIC CIRRHOSIS TYPE (HCC): Status: RESOLVED | Noted: 2021-07-23 | Resolved: 2023-10-30

## 2023-10-30 PROBLEM — M25.552 LEFT HIP PAIN: Status: RESOLVED | Noted: 2023-01-01 | Resolved: 2023-01-01

## 2023-10-30 LAB
ALBUMIN SERPL-MCNC: 3.5 G/DL (ref 3.4–5)
ALBUMIN/GLOB SERPL: 0.8 {RATIO} (ref 1–2)
ALP LIVER SERPL-CCNC: 103 U/L
ALT SERPL-CCNC: 14 U/L
ANION GAP SERPL CALC-SCNC: 7 MMOL/L (ref 0–18)
AST SERPL-CCNC: 23 U/L (ref 15–37)
BILIRUB SERPL-MCNC: 0.9 MG/DL (ref 0.1–2)
BUN BLD-MCNC: 31 MG/DL (ref 7–18)
BUN/CREAT SERPL: 20.1 (ref 10–20)
CALCIUM BLD-MCNC: 9.4 MG/DL (ref 8.5–10.1)
CHLORIDE SERPL-SCNC: 99 MMOL/L (ref 98–112)
CO2 SERPL-SCNC: 29 MMOL/L (ref 21–32)
CREAT BLD-MCNC: 1.54 MG/DL
DEPRECATED RDW RBC AUTO: 57.4 FL (ref 35.1–46.3)
EGFRCR SERPLBLD CKD-EPI 2021: 41 ML/MIN/1.73M2 (ref 60–?)
ERYTHROCYTE [DISTWIDTH] IN BLOOD BY AUTOMATED COUNT: 14.8 % (ref 11–15)
FASTING STATUS PATIENT QL REPORTED: NO
GLOBULIN PLAS-MCNC: 4.4 G/DL (ref 2.8–4.4)
GLUCOSE BLD-MCNC: 90 MG/DL (ref 70–99)
HCT VFR BLD AUTO: 41.4 %
HGB BLD-MCNC: 13.6 G/DL
MCH RBC QN AUTO: 34 PG (ref 26–34)
MCHC RBC AUTO-ENTMCNC: 32.9 G/DL (ref 31–37)
MCV RBC AUTO: 103.5 FL
OSMOLALITY SERPL CALC.SUM OF ELEC: 286 MOSM/KG (ref 275–295)
PLATELET # BLD AUTO: 115 10(3)UL (ref 150–450)
POTASSIUM SERPL-SCNC: 5 MMOL/L (ref 3.5–5.1)
PROT SERPL-MCNC: 7.9 G/DL (ref 6.4–8.2)
RBC # BLD AUTO: 4 X10(6)UL
SODIUM SERPL-SCNC: 135 MMOL/L (ref 136–145)
T4 FREE SERPL-MCNC: 0.9 NG/DL (ref 0.8–1.7)
TSI SER-ACNC: 4.26 MIU/ML (ref 0.36–3.74)
WBC # BLD AUTO: 7.8 X10(3) UL (ref 4–11)

## 2023-10-30 PROCEDURE — 36415 COLL VENOUS BLD VENIPUNCTURE: CPT

## 2023-10-30 PROCEDURE — 1125F AMNT PAIN NOTED PAIN PRSNT: CPT | Performed by: INTERNAL MEDICINE

## 2023-10-30 PROCEDURE — 80053 COMPREHEN METABOLIC PANEL: CPT

## 2023-10-30 PROCEDURE — G0439 PPPS, SUBSEQ VISIT: HCPCS | Performed by: INTERNAL MEDICINE

## 2023-10-30 PROCEDURE — 84443 ASSAY THYROID STIM HORMONE: CPT

## 2023-10-30 PROCEDURE — 84439 ASSAY OF FREE THYROXINE: CPT

## 2023-10-30 PROCEDURE — 85027 COMPLETE CBC AUTOMATED: CPT

## 2023-11-07 ENCOUNTER — PATIENT MESSAGE (OUTPATIENT)
Dept: INTERNAL MEDICINE CLINIC | Facility: CLINIC | Age: 88
End: 2023-11-07

## 2023-11-07 ENCOUNTER — LAB ENCOUNTER (OUTPATIENT)
Dept: LAB | Facility: HOSPITAL | Age: 88
End: 2023-11-07
Attending: INTERNAL MEDICINE
Payer: MEDICARE

## 2023-11-07 DIAGNOSIS — G89.29 CHRONIC LEFT HIP PAIN: Primary | ICD-10-CM

## 2023-11-07 DIAGNOSIS — I10 HYPERTENSION, UNSPECIFIED TYPE: ICD-10-CM

## 2023-11-07 DIAGNOSIS — E87.1 HYPONATREMIA: Primary | ICD-10-CM

## 2023-11-07 DIAGNOSIS — Z96.642 HISTORY OF TOTAL LEFT HIP REPLACEMENT: ICD-10-CM

## 2023-11-07 DIAGNOSIS — E87.1 HYPONATREMIA: ICD-10-CM

## 2023-11-07 DIAGNOSIS — M25.552 CHRONIC LEFT HIP PAIN: Primary | ICD-10-CM

## 2023-11-07 LAB
BUN BLD-MCNC: 21 MG/DL (ref 9–23)
CREAT BLD-MCNC: 1.09 MG/DL
EGFRCR SERPLBLD CKD-EPI 2021: 62 ML/MIN/1.73M2 (ref 60–?)

## 2023-11-07 PROCEDURE — 84295 ASSAY OF SERUM SODIUM: CPT

## 2023-11-07 PROCEDURE — 36415 COLL VENOUS BLD VENIPUNCTURE: CPT

## 2023-11-07 PROCEDURE — 84520 ASSAY OF UREA NITROGEN: CPT

## 2023-11-07 PROCEDURE — 82565 ASSAY OF CREATININE: CPT

## 2023-11-08 LAB — SODIUM SERPL-SCNC: 134 MMOL/L (ref 136–145)

## 2023-11-08 NOTE — TELEPHONE ENCOUNTER
I have added serum sodium to the existing specimen. Please call lab and inform them. Please inform family after talking to the lab.

## 2023-11-08 NOTE — TELEPHONE ENCOUNTER
Routed to Dr Desirae Collazo for advise, thanks.       Future Appointments   Date Time Provider Tri Christie   11/16/2023 10:00 AM MD FATIMAH Gomez   11/21/2023  1:50 PM Emma Swanson DPM [facility] [place]   1/16/2024  1:10 PM Emma Swanson DPM [facility] [place]   3/18/2024  1:00 PM Gretchen Roche MD [facility]

## 2023-11-14 ENCOUNTER — MED REC SCAN ONLY (OUTPATIENT)
Dept: INTERNAL MEDICINE CLINIC | Facility: CLINIC | Age: 88
End: 2023-11-14

## 2023-11-20 PROBLEM — D72.829 ELEVATED WHITE BLOOD CELL COUNT, UNSPECIFIED: Status: ACTIVE | Noted: 2020-11-09

## 2023-11-20 PROBLEM — M25.552 CHRONIC LEFT HIP PAIN: Status: ACTIVE | Noted: 2023-10-26

## 2023-11-20 PROBLEM — M48.062 SPINAL STENOSIS, LUMBAR REGION, WITH NEUROGENIC CLAUDICATION: Status: ACTIVE | Noted: 2020-11-09

## 2023-11-20 PROBLEM — J69.0 PNEUMONITIS DUE TO INHALATION OF FOOD AND VOMIT (HCC): Status: ACTIVE | Noted: 2020-11-09

## 2023-11-20 PROBLEM — M17.11 UNILATERAL PRIMARY OSTEOARTHRITIS, RIGHT KNEE: Status: ACTIVE | Noted: 2020-11-09

## 2023-11-20 PROBLEM — H91.93 BILATERAL HEARING LOSS: Status: ACTIVE | Noted: 2020-11-09

## 2023-11-20 PROBLEM — G89.29 CHRONIC LEFT HIP PAIN: Status: ACTIVE | Noted: 2023-10-26

## 2023-11-20 PROBLEM — Z96.642 HISTORY OF TOTAL LEFT HIP REPLACEMENT: Status: ACTIVE | Noted: 2023-11-20

## 2023-11-20 PROBLEM — I48.92 ATRIAL FLUTTER, UNSPECIFIED TYPE (HCC): Status: ACTIVE | Noted: 2020-11-09

## 2023-11-20 PROBLEM — G89.29 CHRONIC LEFT HIP PAIN: Status: ACTIVE | Noted: 2023-01-01

## 2023-11-20 PROBLEM — M25.552 CHRONIC LEFT HIP PAIN: Status: ACTIVE | Noted: 2023-01-01

## 2023-11-20 PROBLEM — Z96.642 HISTORY OF TOTAL LEFT HIP REPLACEMENT: Status: ACTIVE | Noted: 2023-01-01

## 2023-12-02 ENCOUNTER — LAB ENCOUNTER (OUTPATIENT)
Dept: LAB | Age: 88
End: 2023-12-02
Attending: INTERNAL MEDICINE
Payer: MEDICARE

## 2023-12-02 DIAGNOSIS — I48.19 PERSISTENT ATRIAL FIBRILLATION (HCC): ICD-10-CM

## 2023-12-02 DIAGNOSIS — I25.10 CORONARY ARTERY DISEASE: ICD-10-CM

## 2023-12-02 DIAGNOSIS — I10 HYPERTENSION, ESSENTIAL: Primary | ICD-10-CM

## 2023-12-02 LAB
ALBUMIN SERPL-MCNC: 3.8 G/DL (ref 3.2–4.8)
ALBUMIN/GLOB SERPL: 1.2 {RATIO} (ref 1–2)
ALP LIVER SERPL-CCNC: 99 U/L
ALT SERPL-CCNC: 15 U/L
ANION GAP SERPL CALC-SCNC: 5 MMOL/L (ref 0–18)
AST SERPL-CCNC: 25 U/L (ref ?–34)
BILIRUB SERPL-MCNC: 1 MG/DL (ref 0.2–0.9)
BUN BLD-MCNC: 24 MG/DL (ref 9–23)
BUN/CREAT SERPL: 21.6 (ref 10–20)
CALCIUM BLD-MCNC: 9.2 MG/DL (ref 8.7–10.4)
CHLORIDE SERPL-SCNC: 96 MMOL/L (ref 98–112)
CO2 SERPL-SCNC: 30 MMOL/L (ref 21–32)
CREAT BLD-MCNC: 1.11 MG/DL
EGFRCR SERPLBLD CKD-EPI 2021: 60 ML/MIN/1.73M2 (ref 60–?)
FASTING STATUS PATIENT QL REPORTED: NO
GLOBULIN PLAS-MCNC: 3.1 G/DL (ref 2.8–4.4)
GLUCOSE BLD-MCNC: 69 MG/DL (ref 70–99)
MAGNESIUM SERPL-MCNC: 1.8 MG/DL (ref 1.6–2.6)
OSMOLALITY SERPL CALC.SUM OF ELEC: 274 MOSM/KG (ref 275–295)
POTASSIUM SERPL-SCNC: 4.5 MMOL/L (ref 3.5–5.1)
PROT SERPL-MCNC: 6.9 G/DL (ref 5.7–8.2)
SODIUM SERPL-SCNC: 131 MMOL/L (ref 136–145)
T4 SERPL-MCNC: 5.3 UG/DL
TSI SER-ACNC: 3.65 MIU/ML (ref 0.55–4.78)

## 2023-12-02 PROCEDURE — 83735 ASSAY OF MAGNESIUM: CPT

## 2023-12-02 PROCEDURE — 36415 COLL VENOUS BLD VENIPUNCTURE: CPT

## 2023-12-02 PROCEDURE — 80053 COMPREHEN METABOLIC PANEL: CPT

## 2023-12-02 PROCEDURE — 84443 ASSAY THYROID STIM HORMONE: CPT

## 2023-12-02 PROCEDURE — 84436 ASSAY OF TOTAL THYROXINE: CPT

## 2024-01-11 ENCOUNTER — LAB ENCOUNTER (OUTPATIENT)
Dept: LAB | Age: 89
End: 2024-01-11
Attending: INTERNAL MEDICINE
Payer: MEDICARE

## 2024-01-11 DIAGNOSIS — I10 ESSENTIAL HYPERTENSION, BENIGN: ICD-10-CM

## 2024-01-11 DIAGNOSIS — I35.0 AORTIC STENOSIS: ICD-10-CM

## 2024-01-11 DIAGNOSIS — I48.19 PERSISTENT ATRIAL FIBRILLATION (HCC): ICD-10-CM

## 2024-01-11 DIAGNOSIS — I25.10 CAD (CORONARY ARTERY DISEASE): Primary | ICD-10-CM

## 2024-01-11 LAB
ALBUMIN SERPL-MCNC: 3.7 G/DL (ref 3.2–4.8)
ALBUMIN/GLOB SERPL: 1.2 {RATIO} (ref 1–2)
ALP LIVER SERPL-CCNC: 94 U/L
ALT SERPL-CCNC: 9 U/L
ANION GAP SERPL CALC-SCNC: 4 MMOL/L (ref 0–18)
AST SERPL-CCNC: 26 U/L (ref ?–34)
BILIRUB SERPL-MCNC: 0.9 MG/DL (ref 0.2–0.9)
BUN BLD-MCNC: 14 MG/DL (ref 9–23)
BUN/CREAT SERPL: 14.3 (ref 10–20)
CALCIUM BLD-MCNC: 9.2 MG/DL (ref 8.7–10.4)
CHLORIDE SERPL-SCNC: 95 MMOL/L (ref 98–112)
CO2 SERPL-SCNC: 28 MMOL/L (ref 21–32)
CREAT BLD-MCNC: 0.98 MG/DL
EGFRCR SERPLBLD CKD-EPI 2021: 70 ML/MIN/1.73M2 (ref 60–?)
FASTING STATUS PATIENT QL REPORTED: NO
GLOBULIN PLAS-MCNC: 3.2 G/DL (ref 2.8–4.4)
GLUCOSE BLD-MCNC: 92 MG/DL (ref 70–99)
OSMOLALITY SERPL CALC.SUM OF ELEC: 264 MOSM/KG (ref 275–295)
POTASSIUM SERPL-SCNC: 4.5 MMOL/L (ref 3.5–5.1)
PROT SERPL-MCNC: 6.9 G/DL (ref 5.7–8.2)
SODIUM SERPL-SCNC: 127 MMOL/L (ref 136–145)

## 2024-01-11 PROCEDURE — 36415 COLL VENOUS BLD VENIPUNCTURE: CPT

## 2024-01-11 PROCEDURE — 80053 COMPREHEN METABOLIC PANEL: CPT

## 2024-01-13 ENCOUNTER — HOSPITAL ENCOUNTER (EMERGENCY)
Age: 89
Discharge: HOME | End: 2024-01-13
Attending: EMERGENCY MEDICINE

## 2024-01-13 VITALS
RESPIRATION RATE: 22 BRPM | HEART RATE: 53 BPM | OXYGEN SATURATION: 94 % | DIASTOLIC BLOOD PRESSURE: 78 MMHG | TEMPERATURE: 97.9 F | SYSTOLIC BLOOD PRESSURE: 181 MMHG | WEIGHT: 193 LBS | BODY MASS INDEX: 26.92 KG/M2

## 2024-01-13 DIAGNOSIS — E87.1 HYPONATREMIA: Primary | ICD-10-CM

## 2024-01-13 LAB
ALBUMIN SERPL-MCNC: 3 G/DL (ref 3.6–5.1)
ALBUMIN/GLOB SERPL: 0.7 {RATIO} (ref 1–2.4)
ALP SERPL-CCNC: 98 UNITS/L (ref 45–117)
ALT SERPL-CCNC: 13 UNITS/L
ANION GAP SERPL CALC-SCNC: 12 MMOL/L (ref 7–19)
AST SERPL-CCNC: 23 UNITS/L
BASOPHILS # BLD: 0.1 K/MCL (ref 0–0.3)
BASOPHILS NFR BLD: 1 %
BILIRUB SERPL-MCNC: 0.9 MG/DL (ref 0.2–1)
BUN SERPL-MCNC: 15 MG/DL (ref 6–20)
BUN/CREAT SERPL: 16 (ref 7–25)
CALCIUM SERPL-MCNC: 8.7 MG/DL (ref 8.4–10.2)
CHLORIDE SERPL-SCNC: 98 MMOL/L (ref 97–110)
CO2 SERPL-SCNC: 27 MMOL/L (ref 21–32)
CREAT SERPL-MCNC: 0.93 MG/DL (ref 0.67–1.17)
DEPRECATED RDW RBC: 53.3 FL (ref 39–50)
EGFRCR SERPLBLD CKD-EPI 2021: 75 ML/MIN/{1.73_M2}
EOSINOPHIL # BLD: 0.2 K/MCL (ref 0–0.5)
EOSINOPHIL NFR BLD: 3 %
ERYTHROCYTE [DISTWIDTH] IN BLOOD: 14.6 % (ref 11–15)
FASTING DURATION TIME PATIENT: ABNORMAL H
GLOBULIN SER-MCNC: 4.3 G/DL (ref 2–4)
GLUCOSE SERPL-MCNC: 102 MG/DL (ref 70–99)
HCT VFR BLD CALC: 40.8 % (ref 39–51)
HGB BLD-MCNC: 13.7 G/DL (ref 13–17)
IMM GRANULOCYTES # BLD AUTO: 0 K/MCL (ref 0–0.2)
IMM GRANULOCYTES # BLD: 1 %
LYMPHOCYTES # BLD: 1.3 K/MCL (ref 1–4)
LYMPHOCYTES NFR BLD: 16 %
MAGNESIUM SERPL-MCNC: 1.7 MG/DL (ref 1.7–2.4)
MCH RBC QN AUTO: 33.7 PG (ref 26–34)
MCHC RBC AUTO-ENTMCNC: 33.6 G/DL (ref 32–36.5)
MCV RBC AUTO: 100.2 FL (ref 78–100)
MONOCYTES # BLD: 1.1 K/MCL (ref 0.3–0.9)
MONOCYTES NFR BLD: 13 %
NEUTROPHILS # BLD: 5.5 K/MCL (ref 1.8–7.7)
NEUTROPHILS NFR BLD: 66 %
NRBC BLD MANUAL-RTO: 0 /100 WBC
NT-PROBNP SERPL-MCNC: 1557 PG/ML
PLATELET # BLD AUTO: 151 K/MCL (ref 140–450)
POTASSIUM SERPL-SCNC: 4.2 MMOL/L (ref 3.4–5.1)
PROT SERPL-MCNC: 7.3 G/DL (ref 6.4–8.2)
RBC # BLD: 4.07 MIL/MCL (ref 4.5–5.9)
SODIUM SERPL-SCNC: 133 MMOL/L (ref 135–145)
WBC # BLD: 8.2 K/MCL (ref 4.2–11)

## 2024-01-13 PROCEDURE — 83880 ASSAY OF NATRIURETIC PEPTIDE: CPT | Performed by: EMERGENCY MEDICINE

## 2024-01-13 PROCEDURE — 10002807 HB RX 258: Performed by: EMERGENCY MEDICINE

## 2024-01-13 PROCEDURE — 10002803 HB RX 637: Performed by: EMERGENCY MEDICINE

## 2024-01-13 PROCEDURE — 83735 ASSAY OF MAGNESIUM: CPT | Performed by: EMERGENCY MEDICINE

## 2024-01-13 PROCEDURE — 80053 COMPREHEN METABOLIC PANEL: CPT | Performed by: EMERGENCY MEDICINE

## 2024-01-13 PROCEDURE — 93005 ELECTROCARDIOGRAM TRACING: CPT | Performed by: EMERGENCY MEDICINE

## 2024-01-13 PROCEDURE — 85025 COMPLETE CBC W/AUTO DIFF WBC: CPT | Performed by: EMERGENCY MEDICINE

## 2024-01-13 RX ORDER — AMLODIPINE BESYLATE 5 MG/1
2.5 TABLET ORAL ONCE
Status: COMPLETED | OUTPATIENT
Start: 2024-01-13 | End: 2024-01-13

## 2024-01-13 RX ORDER — SODIUM CHLORIDE 9 MG/ML
INJECTION, SOLUTION INTRAVENOUS ONCE
Status: COMPLETED | OUTPATIENT
Start: 2024-01-13 | End: 2024-01-13

## 2024-01-13 RX ADMIN — SODIUM CHLORIDE: 9 INJECTION, SOLUTION INTRAVENOUS at 16:12

## 2024-01-13 RX ADMIN — AMLODIPINE BESYLATE 2.5 MG: 5 TABLET ORAL at 18:24

## 2024-01-13 ASSESSMENT — PAIN SCALES - GENERAL: PAINLEVEL_OUTOF10: 2

## 2024-01-15 LAB
ATRIAL RATE (BPM): 36
QRS-INTERVAL (MSEC): 108
QT-INTERVAL (MSEC): 492
QTC: 471
R AXIS (DEGREES): -42
REPORT TEXT: NORMAL
T AXIS (DEGREES): 5
VENTRICULAR RATE EKG/MIN (BPM): 55

## 2024-01-16 ENCOUNTER — TELEPHONE (OUTPATIENT)
Dept: INTERNAL MEDICINE CLINIC | Facility: CLINIC | Age: 89
End: 2024-01-16

## 2024-01-16 DIAGNOSIS — R53.83 OTHER FATIGUE: Primary | ICD-10-CM

## 2024-01-16 NOTE — TELEPHONE ENCOUNTER
Patients daughter called and is requesting an order for urinalysis, and is asking if it can be sent to [facility] [place] because it is closer to them.

## 2024-01-16 NOTE — TELEPHONE ENCOUNTER
[facility]  [place]#[phone]      No order for urinalysis please place order if appropriate  See patient message 1/15/24

## 2024-01-17 NOTE — TELEPHONE ENCOUNTER
Spoke to Chata.  Advised her that the urine test was not ordered   She stated that there were no issues with orders.  She states that he has been confused and fatigued.   So she requested the urine test.  She states that is why she requested a urine test.        She states Johnny has appt tomorrow 1/18 with Cardiology tomorrow.  Also has a video visit at 4pm with PCP at [facility], he can possibly leave a sample then.

## 2024-01-18 ENCOUNTER — LAB ENCOUNTER (OUTPATIENT)
Dept: LAB | Facility: HOSPITAL | Age: 89
End: 2024-01-18
Attending: INTERNAL MEDICINE
Payer: MEDICARE

## 2024-01-18 ENCOUNTER — TELEMEDICINE (OUTPATIENT)
Facility: CLINIC | Age: 89
End: 2024-01-18
Payer: MEDICARE

## 2024-01-18 DIAGNOSIS — E78.00 PURE HYPERCHOLESTEROLEMIA: ICD-10-CM

## 2024-01-18 DIAGNOSIS — R53.83 OTHER FATIGUE: ICD-10-CM

## 2024-01-18 DIAGNOSIS — G89.29 CHRONIC LEFT HIP PAIN: Primary | ICD-10-CM

## 2024-01-18 DIAGNOSIS — M25.552 CHRONIC LEFT HIP PAIN: Primary | ICD-10-CM

## 2024-01-18 DIAGNOSIS — I10 ESSENTIAL HYPERTENSION, MALIGNANT: ICD-10-CM

## 2024-01-18 DIAGNOSIS — E87.1 HYPONATREMIA: ICD-10-CM

## 2024-01-18 DIAGNOSIS — I25.10 CORONARY ATHEROSCLEROSIS OF NATIVE CORONARY ARTERY: Primary | ICD-10-CM

## 2024-01-18 LAB
ANION GAP SERPL CALC-SCNC: 3 MMOL/L (ref 0–18)
BUN BLD-MCNC: 13 MG/DL (ref 9–23)
BUN/CREAT SERPL: 15.1 (ref 10–20)
CALCIUM BLD-MCNC: 9.3 MG/DL (ref 8.7–10.4)
CHLORIDE SERPL-SCNC: 99 MMOL/L (ref 98–112)
CO2 SERPL-SCNC: 28 MMOL/L (ref 21–32)
CREAT BLD-MCNC: 0.86 MG/DL
EGFRCR SERPLBLD CKD-EPI 2021: 79 ML/MIN/1.73M2 (ref 60–?)
FASTING STATUS PATIENT QL REPORTED: NO
GLUCOSE BLD-MCNC: 63 MG/DL (ref 70–99)
HYALINE CASTS #/AREA URNS AUTO: PRESENT /LPF
OSMOLALITY SERPL CALC.SUM OF ELEC: 268 MOSM/KG (ref 275–295)
POTASSIUM SERPL-SCNC: 4.5 MMOL/L (ref 3.5–5.1)
SODIUM SERPL-SCNC: 130 MMOL/L (ref 136–145)

## 2024-01-18 PROCEDURE — 87086 URINE CULTURE/COLONY COUNT: CPT

## 2024-01-18 PROCEDURE — 80048 BASIC METABOLIC PNL TOTAL CA: CPT

## 2024-01-18 PROCEDURE — 81015 MICROSCOPIC EXAM OF URINE: CPT

## 2024-01-18 PROCEDURE — 36415 COLL VENOUS BLD VENIPUNCTURE: CPT

## 2024-01-24 ENCOUNTER — OFFICE VISIT (OUTPATIENT)
Dept: PODIATRY CLINIC | Facility: CLINIC | Age: 89
End: 2024-01-24
Payer: MEDICARE

## 2024-01-24 ENCOUNTER — LAB ENCOUNTER (OUTPATIENT)
Dept: LAB | Age: 89
End: 2024-01-24
Attending: NURSE PRACTITIONER
Payer: MEDICARE

## 2024-01-24 DIAGNOSIS — B35.1 ONYCHOMYCOSIS: Primary | ICD-10-CM

## 2024-01-24 DIAGNOSIS — E78.00 PURE HYPERCHOLESTEROLEMIA: ICD-10-CM

## 2024-01-24 DIAGNOSIS — R26.81 GAIT INSTABILITY: ICD-10-CM

## 2024-01-24 DIAGNOSIS — L60.0 INGROWN TOENAIL OF RIGHT FOOT: ICD-10-CM

## 2024-01-24 DIAGNOSIS — I25.10 CAD (CORONARY ARTERY DISEASE): Primary | ICD-10-CM

## 2024-01-24 DIAGNOSIS — I10 ESSENTIAL HYPERTENSION: ICD-10-CM

## 2024-01-24 LAB
ANION GAP SERPL CALC-SCNC: 5 MMOL/L (ref 0–18)
BUN BLD-MCNC: 15 MG/DL (ref 9–23)
BUN/CREAT SERPL: 14.6 (ref 10–20)
CALCIUM BLD-MCNC: 9.8 MG/DL (ref 8.7–10.4)
CHLORIDE SERPL-SCNC: 97 MMOL/L (ref 98–112)
CO2 SERPL-SCNC: 28 MMOL/L (ref 21–32)
CREAT BLD-MCNC: 1.03 MG/DL
EGFRCR SERPLBLD CKD-EPI 2021: 66 ML/MIN/1.73M2 (ref 60–?)
FASTING STATUS PATIENT QL REPORTED: NO
GLUCOSE BLD-MCNC: 76 MG/DL (ref 70–99)
OSMOLALITY SERPL CALC.SUM OF ELEC: 270 MOSM/KG (ref 275–295)
POTASSIUM SERPL-SCNC: 4.9 MMOL/L (ref 3.5–5.1)
SODIUM SERPL-SCNC: 130 MMOL/L (ref 136–145)

## 2024-01-24 PROCEDURE — 80048 BASIC METABOLIC PNL TOTAL CA: CPT

## 2024-01-24 PROCEDURE — 99213 OFFICE O/P EST LOW 20 MIN: CPT | Performed by: PODIATRIST

## 2024-01-24 PROCEDURE — 36415 COLL VENOUS BLD VENIPUNCTURE: CPT

## 2024-01-24 NOTE — PROGRESS NOTES
[facility] Podiatry  Progress Note    Johnny Magaña is a 89 year old male.   Chief Complaint   Patient presents with    Toenail Care     Pt here for nail care and foot check - R 3rd toe ,daughter states skin around nail is hard - Pt here with his daughter          HPI:     This is a pleasant male with afib, CAD, lumbar stenosis, vascular dementia, alocholic cirrhosis of liver, gout on allopurinol.  He is on apixaban.      He presents to clinic today with his daughter.  He does use a walker for short distances.    He presents to clinic today for routine foot care.         Allergies: Patient has no known allergies.   Current Outpatient Medications   Medication Sig Dispense Refill    polyethylene glycol, PEG 3350, 17 g Oral Powd Pack Take 17 g by mouth as needed (constipation).      omeprazole 20 MG Oral Capsule Delayed Release Take 1 capsule (20 mg total) by mouth before breakfast. 90 capsule 3    Potassium Chloride ER 20 MEQ Oral Tab CR Take 1 tablet by mouth every morning. 90 tablet 3    allopurinol 300 MG Oral Tab Take 1 tablet (300 mg total) by mouth daily. 90 tablet 3    tamsulosin 0.4 MG Oral Cap Take 1 capsule (0.4 mg total) by mouth daily. (Patient taking differently: Take 1 capsule (0.4 mg total) by mouth nightly.) 90 capsule 3    Lovastatin 40 MG Oral Tab Take 1 tablet (40 mg total) by mouth nightly. 90 tablet 3    bumetanide 0.5 MG Oral Tab Take 1 tablet (0.5 mg total) by mouth 2 (two) times daily.      amLODIPine Besylate 2.5 MG Oral Tab Take 1 tablet (2.5 mg total) by mouth daily as needed. Only take if SBP is above 150      apixaban 5 MG Oral Tab Take 1 tablet (5 mg total) by mouth 2 (two) times daily. 60 tablet 11    Multiple Vitamins-Minerals (CENTRUM SILVER ULTRA MENS) Oral Tab Take 1 tablet by mouth daily.        methylPREDNISolone 4 MG Oral Tablet Therapy Pack 1 package as directed. 21 tablet 1    MEDROL 4 MG Oral Tablet Therapy Pack As directed. 1 each 0      Past Medical History:   Diagnosis  Date    Acute bilateral low back pain without sciatica 8/25/2022    Acute gastric ulcer without hemorrhage or perforation     Acute on chronic pancreatitis (HCC) 9/21/2021    Aspiration pneumonia (HCC) 11/11/2020    Atrial fibrillation (HCC)     BCC (basal cell carcinoma) 2019    right ear pinna    Bilateral inguinal hernia     Cancer (HCC) 2010, 2019, 2020    partial nephrectomy/skin cancers    Cancer of kidney (HCC) 2005    Carotid bruit 4/16/2019    Cholecystitis 2013    Cirrhosis, cryptogenic (HCC) 2013    Essential hypertension     controlled w/medication    Heart disease     aortic stenosis; Afib, coronary artery disease    Hemarthrosis of left knee 6/4/2019    High blood pressure     High cholesterol     Hip arthritis 2000    Hyperlipidemia     Keratoacanthoma type squamous cell carcinoma of skin 03/2020    L forearm    Mixed hyperlipidemia 9/14/2015    Pancreatitis 07/23/2021    Persistent atrial fibrillation (HCC) 3/28/2019    SCC (squamous cell carcinoma) 2019    right forehead - SCC in situ    SCC (squamous cell carcinoma) 05/2020    L forearm x 2    Skin lesion of right arm 2/18/2020    Stenosis of aorta     per daughter    Viral upper respiratory tract infection 12/5/2022      Past Surgical History:   Procedure Laterality Date    BIOPSY OF SKIN LESION Left 03/05/2020    Forearm    BIOPSY OF SKIN LESION Left 05/21/2020    Forearm x 2    CARPAL TUNNEL RELEASE Right 2009    COLONOSCOPY  2000    EXC SKIN MALIG 2.1-3CM FACE,FACIAL Right 02/12/2020    HIP REPLACEMENT SURGERY Left 01/05/2000    LAMINECTOMY  2000    LAPAROSCOPY, SURGICAL; PARTIAL NEPHRECTOMY Right     REPR CMPL WND HEAD,FAC,HAND 2.6-7.5 Right 02/12/2020    REPR CMPL WND LID,NOS,EAR 2.5-7.5 Right 02/12/2020      Family History   Problem Relation Age of Onset    Heart Attack Mother         CHF/CRF (cause of death)    Diabetes Mother     Hypertension Mother     Heart Disease Sister         CAD (cause of death)    Heart Attack Brother         CHF  (cause of death)    Cancer Brother 72        stomach     Lipids Other         family h/o hyperlipidemia and vascular disease      Social History     Socioeconomic History    Marital status:     Number of children: 3   Occupational History    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     Comment: [employer], retired   Tobacco Use    Smoking status: Former     Packs/day: 0.50     Years: 30.00     Additional pack years: 0.00     Total pack years: 15.00     Types: Cigarettes     Quit date: 1987     Years since quittin.4    Smokeless tobacco: Never    Tobacco comments:     light smoker in his 20s, weaned himself to 1 per day till  quit   Vaping Use    Vaping Use: Never used   Substance and Sexual Activity    Alcohol use: No    Drug use: No    Sexual activity: Not Currently   Other Topics Concern    Caffeine Concern Yes     Comment: coffee, 2 cups    Reaction to local anesthetic No           REVIEW OF SYSTEMS:   Denies nausea, fever, chills  No calf pain  No other muscle or joint aches  Denies chest pain or shortness of breath.      EXAM:   There were no vitals taken for this visit.    Constitutional:   Patient in no apparent distress. Well kept. Of normal body habitus. Alert and oriented to person, place, and time.  Vascular Examination:  DP pulse is NP  PT pulse is NP  Capillary refill is adequate  Edema is present bilateral  Varicosities present bilateral  Temperature warm proximally to warm distally bilateral  Integumentary Examination:   The patient's nails appear incurvated, thickened, elongated, dystrophic, discolored with subungual debris 1-5 right, 1-5  left nails.    Right 3rd toe lateral nail border is incurvated with no SOI    Digital hair growth is absent  Skin is of diminished texture and decreased turgor.  Neurological Examination:  Sharp/dull is present to right and is present to left.  Parasthesias absent.  Musculoskeletal Examination:  B/L pedal muscle strength  diminished          LABS & IMAGING:     Lab Results   Component Value Date    GLU 63 (L) 01/18/2024    BUN 13 01/18/2024    CREATSERUM 0.86 01/18/2024    BUNCREA 15.1 01/18/2024    ANIONGAP 3 01/18/2024    GFRAA 59 (L) 05/26/2022    GFRNAA 51 (L) 05/26/2022    CA 9.3 01/18/2024     (L) 01/18/2024    K 4.5 01/18/2024    CL 99 01/18/2024    CO2 28.0 01/18/2024    OSMOCALC 268 (L) 01/18/2024        No results found for: \"EAG\", \"A1C\"     No results found.     ASSESSMENT AND PLAN:   Diagnoses and all orders for this visit:    Onychomycosis    Gait instability    Ingrown toenail of right foot              Plan:     Reviewed treatment options for nail dystrophy / onychomycosis including:   No treatment and monitoring, topical meds, oral meds, nail removal   Advantages and disadvantages of each reviewed. Using oral agents would require regular blood test monitoring due to risk of hepatotoxicity and other adverse reactions including drug interactions.   Topical meds are much safer yet carry very low efficacy.  Pt has opted for debridement of nails and monitoring.     Evaluated the patient. Discussed treatment options with the patient.  Discussed with patient proper care and hygiene for their feet.  Patient tolerated procedures well, without incident.    Instrumentation used includes nail nippers and electric  where appropriate.  Procedure: (44985 Debridement of toenails 6-10) Surgically debrided and mechanically reduced 6 or more toenails.Hemmorhage occurred none.Nails that were debrided appeared dystrophic and caused the patient pain in shoe gear.Nails 5 Left & 5 Right.     RTC 4 months for nail care.      No follow-ups on file.    Rocco Sim DPM  1/24/24

## 2024-01-27 NOTE — PROGRESS NOTES
Virtual Virtual Check-In    Johnny Magaña verbally consents to a Virtual Video Check-In service on 01/27/24. Patient understands and accepts financial responsibility for any deductible, co-insurance and/or co-pays associated with this service. This visit is conducted using Telemedicine with live, interactive video and audio.     I spoke with Johnny Magaña by secure video chat, verified date of birth, and discussed their current concerns:   Duration: 13 minutes    HPI  89-year-old gentleman along with the daughter requesting a telemedicine consultation for a follow-up of hyponatremia.  They report that patient continues to have mobility issues because of arthritis and old age.  We are keeping an eye on his blood pressure and volume status at home.  Denies any chest pain.  Breathing is at baseline.  Denies any abdominal pain nausea or vomiting.        Patient just completed visit with cardiology.  Blood pressure was 132/54.  I have documented in patient reported vitals.    Review of Systems:   Review of Systems       Reviewed Active Problems:  Patient Active Problem List    Diagnosis    History of total left hip replacement    Congestive heart failure, unspecified HF chronicity, unspecified heart failure type (HCC)    Chronic left hip pain    Trochanteric bursitis of left hip    Gouty arthritis    Chronic pancreatitis (HCC)    BPH (benign prostatic hyperplasia)    Vascular dementia (HCC)    Bilateral hearing loss    Atrial flutter, unspecified type (HCC)    Pneumonitis due to inhalation of food and vomit (HCC)    Unilateral primary osteoarthritis, right knee    Elevated white blood cell count, unspecified    Spinal stenosis, lumbar region, with neurogenic claudication    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    Basal cell carcinoma (BCC) of helix of right ear    Aortic valve stenosis     Follows up with Dr Warren      Primary osteoarthritis of left knee    Chondrocalcinosis articularis    Atherosclerosis  of native coronary artery of native heart with angina pectoris (HCC)    Atrial fibrillation (HCC)    Chronic left-sided low back pain with left-sided sciatica    Spinal stenosis of lumbar region with neurogenic claudication    History of total hip arthroplasty, left    Actinic keratosis    Neoplasm of uncertain behavior of skin    Personal history of skin cancer    Primary osteoarthritis of right hip    Hypertension    Alcoholic cirrhosis of liver without ascites (HCC)    Sensorineural hearing loss, bilateral    Thrombocytopenia (HCC)    Hypercholesterolemia      Reviewed Past Medical History:  Past Medical History:   Diagnosis Date    Acute bilateral low back pain without sciatica 8/25/2022    Acute gastric ulcer without hemorrhage or perforation     Acute on chronic pancreatitis (HCC) 9/21/2021    Aspiration pneumonia (HCC) 11/11/2020    Atrial fibrillation (HCC)     BCC (basal cell carcinoma) 2019    right ear pinna    Bilateral inguinal hernia     Cancer (HCC) 2010, 2019, 2020    partial nephrectomy/skin cancers    Cancer of kidney (HCC) 2005    Carotid bruit 4/16/2019    Cholecystitis 2013    Cirrhosis, cryptogenic (HCC) 2013    Essential hypertension     controlled w/medication    Heart disease     aortic stenosis; Afib, coronary artery disease    Hemarthrosis of left knee 6/4/2019    High blood pressure     High cholesterol     Hip arthritis 2000    Hyperlipidemia     Keratoacanthoma type squamous cell carcinoma of skin 03/2020    L forearm    Mixed hyperlipidemia 9/14/2015    Pancreatitis 07/23/2021    Persistent atrial fibrillation (HCC) 3/28/2019    SCC (squamous cell carcinoma) 2019    right forehead - SCC in situ    SCC (squamous cell carcinoma) 05/2020    L forearm x 2    Skin lesion of right arm 2/18/2020    Stenosis of aorta     per daughter    Viral upper respiratory tract infection 12/5/2022      Reviewed Family History:  Family History   Problem Relation Age of Onset    Heart Attack Mother          CHF/CRF (cause of death)    Diabetes Mother     Hypertension Mother     Heart Disease Sister         CAD (cause of death)    Heart Attack Brother         CHF (cause of death)    Cancer Brother 72        stomach     Lipids Other         family h/o hyperlipidemia and vascular disease       Reviewed Social History:  Social History     Socioeconomic History    Marital status:     Number of children: 3   Occupational History    Occupation: Director/manager [employer]     Comment: [employer], retired   Tobacco Use    Smoking status: Former     Packs/day: 0.50     Years: 30.00     Additional pack years: 0.00     Total pack years: 15.00     Types: Cigarettes     Quit date: 1987     Years since quittin.5    Smokeless tobacco: Never    Tobacco comments:     light smoker in his 20s, weaned himself to 1 per day till  quit   Vaping Use    Vaping Use: Never used   Substance and Sexual Activity    Alcohol use: No    Drug use: No    Sexual activity: Not Currently   Other Topics Concern    Caffeine Concern Yes     Comment: coffee, 2 cups    Reaction to local anesthetic No      Reviewed Current Medications:  Current Outpatient Medications   Medication Sig Dispense Refill    methylPREDNISolone 4 MG Oral Tablet Therapy Pack 1 package as directed. 21 tablet 1    MEDROL 4 MG Oral Tablet Therapy Pack As directed. 1 each 0    polyethylene glycol, PEG 3350, 17 g Oral Powd Pack Take 17 g by mouth as needed (constipation).      omeprazole 20 MG Oral Capsule Delayed Release Take 1 capsule (20 mg total) by mouth before breakfast. 90 capsule 3    Potassium Chloride ER 20 MEQ Oral Tab CR Take 1 tablet by mouth every morning. 90 tablet 3    allopurinol 300 MG Oral Tab Take 1 tablet (300 mg total) by mouth daily. 90 tablet 3    tamsulosin 0.4 MG Oral Cap Take 1 capsule (0.4 mg total) by mouth daily. (Patient taking differently: Take 1 capsule (0.4 mg total) by mouth nightly.) 90 capsule 3    Lovastatin 40 MG Oral Tab  Take 1 tablet (40 mg total) by mouth nightly. 90 tablet 3    bumetanide 0.5 MG Oral Tab Take 1 tablet (0.5 mg total) by mouth 2 (two) times daily.      amLODIPine Besylate 2.5 MG Oral Tab Take 1 tablet (2.5 mg total) by mouth daily as needed. Only take if SBP is above 150      apixaban 5 MG Oral Tab Take 1 tablet (5 mg total) by mouth 2 (two) times daily. 60 tablet 11    Multiple Vitamins-Minerals (CENTRUM SILVER ULTRA MENS) Oral Tab Take 1 tablet by mouth daily.              Physical Exam  There were no vitals filed for this visit.  Physical Exam   Constitutional:       General: He is not in acute distress.     Appearance: Normal appearance.   HENT:      Head: Normocephalic and atraumatic.      Nose: Nose normal.   Neurological:      Mental Status: He is alert and oriented to person, place, and time.   Psychiatric:         Behavior: Behavior normal.         Thought Content: Thought content normal.         Judgment: Judgment normal.       Diagnosis:  1. Chronic left hip pain  Not interested in any invasive procedures.  Continue with conservative management.  Discussed fall prevention.    2. Hyponatremia  Got better.  Continue to monitor fluid status.  May need repeat basic metabolic panel in couple of weeks.    Elevated blood pressure-I have reviewed the records from [facility] cardiology in Care Everywhere.  Blood pressures running good today in the cardiology office.  Will continue to monitor.     Plan: As above.    Follow up: No follow-ups on file.    Please note that the following visit was completed using two-way, real-time interactive audio and/or video communication.  This has been done in good ross to provide continuity of care in the best interest of the provider-patient relationship, due to the ongoing public health crisis/national emergency and because of restrictions of visitation.  There are limitations of this visit as no physical exam could be performed.  Every conscious effort was taken to allow for  sufficient and adequate time.  This billing was spent on reviewing labs, medications, radiology tests and decision making.  Appropriate medical decision-making and tests are ordered as detailed in the plan of care above. Johnny Magaña understands video evaluation is not a substitute for in person examination or emergency care. Patient advised to go to ER or call 911 for worsening symptoms or acute distress.     This note was prepared using Dragon Medical voice recognition dictation software. As a result errors may occur. When identified these errors have been corrected. While every attempt is made to correct errors during dictation discrepancies may still exist.  Kenton Zarate MD

## 2024-02-08 ENCOUNTER — TELEPHONE (OUTPATIENT)
Dept: SURGERY | Facility: CLINIC | Age: 89
End: 2024-02-08

## 2024-02-08 NOTE — TELEPHONE ENCOUNTER
Pt's daughter states rec'd pain mngmnt referral at last ov 2/2/2024. Daughter states that ph# handwritten at the top stated that they have no ov notes and referral wasn't faxed over by provider. Pt aware new office location, not sure if new referral needs to be placed? Please advise pt/ daughter

## 2024-02-09 ENCOUNTER — TELEPHONE (OUTPATIENT)
Dept: PAIN CLINIC | Facility: HOSPITAL | Age: 89
End: 2024-02-09

## 2024-02-09 NOTE — TELEPHONE ENCOUNTER
Per Dr. Odom and ELDON Hernandez at office visit on 2.2.24:    \"Patient presents with old imaging studies including a 4 year old MRI lumbar spine with evidence of L2-3 central stenosis. We discussed that this has likely progressed, but his current symptoms are not suggestive of spinal stenosis or a lumbar radiculopathy. Patient's pain is exacerbated with manipulation of the left hip and palpation at the trochanteric head. We have provided him with a referral to the pain clinic for injection in the left hip.\"    Noted Pain Management note in chart:    \"Received incoming fax from Dr. Odom/ [facility] office for pt to be seen by [facility] for Lt Hip Pain.     Referral and records have been endorsed to [facility] for review.\"     Patient's daughter may call [facility] Pain Management to schedule:  [phone]     Called patient's daughter Chata and reached personalized VM.  Left detailed message with above information, and sent [employer] message as well.

## 2024-02-09 NOTE — TELEPHONE ENCOUNTER
Received incoming fax from Dr. Odom/ [facility] office for pt to be seen by [facility] for Lt Hip Pain.    Referral and records have been endorsed to APRN for review.

## 2024-02-12 ENCOUNTER — PATIENT MESSAGE (OUTPATIENT)
Dept: SURGERY | Facility: CLINIC | Age: 89
End: 2024-02-12

## 2024-02-12 ENCOUNTER — MED REC SCAN ONLY (OUTPATIENT)
Dept: INTERNAL MEDICINE CLINIC | Facility: CLINIC | Age: 89
End: 2024-02-12

## 2024-02-12 DIAGNOSIS — M25.552 HIP PAIN, ACUTE, LEFT: Primary | ICD-10-CM

## 2024-02-12 NOTE — TELEPHONE ENCOUNTER
Spoke with patient's daughter Chata who stated that:    --she feels the orthopedic doctor-Dr. Alexander examined patient back in November and informed them that the hip was not the source of her father's pain.   -patient saw ELDON Hernandez on 2.2.24 at the [facility] and understood from the conversation that they were guided in another direction to Pain Management since patient is not a neurosurgical candidate based on old CT Spine results.  -After making a few calls, she realized the referral to Pain Management was not in the chart even though they left the appointment with a referral in hand.   -she was told that the referral did make it to the [place] office eventually, and now she is anticipating calling the Pain Management office at the [facility].   -she has been using ice and heat and would like to give patient an anti inflammatory to help alleviate the pain if the cardiologist approves.    -they are seeing the cardiologist and dentist this week and she has been very busy with other family members' healthcare at home.        Asked Chata if she will be requesting a new CT order from patient's PCP, and she stated that they are going to so many appointments that she will hold off for now on that request and will wait to see the Pain Management doctor to discuss treatment options.      Chata thanked Nursing for the outreach and the call was ended.    None

## 2024-02-12 NOTE — TELEPHONE ENCOUNTER
Noted that patient is not scheduled for appointment at [facility] in Epic appointment tab.      Messaged patient contact information for [facility] Pain Management office via [employer].

## 2024-02-13 ENCOUNTER — HOSPITAL ENCOUNTER (EMERGENCY)
Age: 89
Discharge: HOME | End: 2024-02-13
Attending: EMERGENCY MEDICINE

## 2024-02-13 ENCOUNTER — APPOINTMENT (OUTPATIENT)
Dept: CT IMAGING | Age: 89
End: 2024-02-13
Attending: EMERGENCY MEDICINE

## 2024-02-13 ENCOUNTER — APPOINTMENT (OUTPATIENT)
Dept: GENERAL RADIOLOGY | Age: 89
End: 2024-02-13
Attending: EMERGENCY MEDICINE

## 2024-02-13 VITALS
TEMPERATURE: 96.8 F | WEIGHT: 189 LBS | RESPIRATION RATE: 21 BRPM | DIASTOLIC BLOOD PRESSURE: 65 MMHG | OXYGEN SATURATION: 96 % | HEART RATE: 57 BPM | BODY MASS INDEX: 26.36 KG/M2 | SYSTOLIC BLOOD PRESSURE: 183 MMHG

## 2024-02-13 DIAGNOSIS — R53.1 GENERALIZED WEAKNESS: Primary | ICD-10-CM
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
## 2024-02-13 LAB
ALBUMIN SERPL-MCNC: 3.4 G/DL (ref 3.6–5.1)
ALBUMIN/GLOB SERPL: 0.8 {RATIO} (ref 1–2.4)
ALP SERPL-CCNC: 92 UNITS/L (ref 45–117)
ALT SERPL-CCNC: 16 UNITS/L
ANION GAP SERPL CALC-SCNC: 15 MMOL/L (ref 7–19)
APPEARANCE UR: CLEAR
APTT PPP: 38 SEC (ref 22–32)
AST SERPL-CCNC: 25 UNITS/L
BASOPHILS # BLD: 0 K/MCL (ref 0–0.3)
BASOPHILS NFR BLD: 1 %
BILIRUB SERPL-MCNC: 1.2 MG/DL (ref 0.2–1)
BILIRUB UR QL STRIP: NEGATIVE
BUN SERPL-MCNC: 15 MG/DL (ref 6–20)
BUN/CREAT SERPL: 17 (ref 7–25)
CALCIUM SERPL-MCNC: 9.3 MG/DL (ref 8.4–10.2)
CHLORIDE SERPL-SCNC: 96 MMOL/L (ref 97–110)
CO2 SERPL-SCNC: 27 MMOL/L (ref 21–32)
COLOR UR: YELLOW
CREAT SERPL-MCNC: 0.87 MG/DL (ref 0.67–1.17)
DEPRECATED RDW RBC: 55 FL (ref 39–50)
EGFRCR SERPLBLD CKD-EPI 2021: 78 ML/MIN/{1.73_M2}
EOSINOPHIL # BLD: 0.2 K/MCL (ref 0–0.5)
EOSINOPHIL NFR BLD: 3 %
ERYTHROCYTE [DISTWIDTH] IN BLOOD: 14.7 % (ref 11–15)
FASTING DURATION TIME PATIENT: ABNORMAL H
FLUAV RNA RESP QL NAA+PROBE: NOT DETECTED
FLUBV RNA RESP QL NAA+PROBE: NOT DETECTED
GLOBULIN SER-MCNC: 4.5 G/DL (ref 2–4)
GLUCOSE SERPL-MCNC: 92 MG/DL (ref 70–99)
GLUCOSE UR STRIP-MCNC: NEGATIVE MG/DL
HCT VFR BLD CALC: 45.1 % (ref 39–51)
HGB BLD-MCNC: 15.3 G/DL (ref 13–17)
HGB UR QL STRIP: ABNORMAL
IMM GRANULOCYTES # BLD AUTO: 0 K/MCL (ref 0–0.2)
IMM GRANULOCYTES # BLD: 0 %
INR PPP: 1.3
KETONES UR STRIP-MCNC: NEGATIVE MG/DL
LACTATE BLDV-SCNC: 1.9 MMOL/L (ref 0–2)
LEUKOCYTE ESTERASE UR QL STRIP: NEGATIVE
LYMPHOCYTES # BLD: 1.2 K/MCL (ref 1–4)
LYMPHOCYTES NFR BLD: 18 %
MAGNESIUM SERPL-MCNC: 1.7 MG/DL (ref 1.7–2.4)
MCH RBC QN AUTO: 34.1 PG (ref 26–34)
MCHC RBC AUTO-ENTMCNC: 33.9 G/DL (ref 32–36.5)
MCV RBC AUTO: 100.4 FL (ref 78–100)
MONOCYTES # BLD: 0.8 K/MCL (ref 0.3–0.9)
MONOCYTES NFR BLD: 11 %
NEUTROPHILS # BLD: 4.6 K/MCL (ref 1.8–7.7)
NEUTROPHILS NFR BLD: 67 %
NITRITE UR QL STRIP: NEGATIVE
NRBC BLD MANUAL-RTO: 0 /100 WBC
PH UR STRIP: 7 UNITS (ref 5–7)
PLATELET # BLD AUTO: 135 K/MCL (ref 140–450)
POTASSIUM SERPL-SCNC: 4.5 MMOL/L (ref 3.4–5.1)
PROCALCITONIN SERPL IA-MCNC: <0.05 NG/ML
PROT SERPL-MCNC: 7.9 G/DL (ref 6.4–8.2)
PROT UR STRIP-MCNC: >300 MG/DL
PROTHROMBIN TIME: 13.9 SEC (ref 9.7–11.8)
RBC # BLD: 4.49 MIL/MCL (ref 4.5–5.9)
RSV AG NPH QL IA.RAPID: NOT DETECTED
SARS-COV-2 RNA RESP QL NAA+PROBE: NOT DETECTED
SERVICE CMNT-IMP: NORMAL
SERVICE CMNT-IMP: NORMAL
SODIUM SERPL-SCNC: 133 MMOL/L (ref 135–145)
SP GR UR STRIP: 1.02 (ref 1–1.03)
TROPONIN I SERPL DL<=0.01 NG/ML-MCNC: 32 NG/L
UROBILINOGEN UR STRIP-MCNC: 4 MG/DL
WBC # BLD: 6.8 K/MCL (ref 4.2–11)

## 2024-02-13 PROCEDURE — 85730 THROMBOPLASTIN TIME PARTIAL: CPT | Performed by: EMERGENCY MEDICINE

## 2024-02-13 PROCEDURE — 10004651 HB RX, NO CHARGE ITEM: Performed by: EMERGENCY MEDICINE

## 2024-02-13 PROCEDURE — 36415 COLL VENOUS BLD VENIPUNCTURE: CPT

## 2024-02-13 PROCEDURE — 71045 X-RAY EXAM CHEST 1 VIEW: CPT

## 2024-02-13 PROCEDURE — 84145 PROCALCITONIN (PCT): CPT | Performed by: EMERGENCY MEDICINE

## 2024-02-13 PROCEDURE — 85025 COMPLETE CBC W/AUTO DIFF WBC: CPT | Performed by: EMERGENCY MEDICINE

## 2024-02-13 PROCEDURE — 87040 BLOOD CULTURE FOR BACTERIA: CPT | Performed by: EMERGENCY MEDICINE

## 2024-02-13 PROCEDURE — 84484 ASSAY OF TROPONIN QUANT: CPT | Performed by: EMERGENCY MEDICINE

## 2024-02-13 PROCEDURE — 0241U COVID/FLU/RSV PANEL: CPT | Performed by: EMERGENCY MEDICINE

## 2024-02-13 PROCEDURE — 85610 PROTHROMBIN TIME: CPT | Performed by: EMERGENCY MEDICINE

## 2024-02-13 PROCEDURE — 83605 ASSAY OF LACTIC ACID: CPT | Performed by: EMERGENCY MEDICINE

## 2024-02-13 PROCEDURE — 99285 EMERGENCY DEPT VISIT HI MDM: CPT

## 2024-02-13 PROCEDURE — 93005 ELECTROCARDIOGRAM TRACING: CPT | Performed by: EMERGENCY MEDICINE

## 2024-02-13 PROCEDURE — 72125 CT NECK SPINE W/O DYE: CPT

## 2024-02-13 PROCEDURE — 83735 ASSAY OF MAGNESIUM: CPT | Performed by: EMERGENCY MEDICINE

## 2024-02-13 PROCEDURE — 81003 URINALYSIS AUTO W/O SCOPE: CPT

## 2024-02-13 PROCEDURE — 80053 COMPREHEN METABOLIC PANEL: CPT | Performed by: EMERGENCY MEDICINE

## 2024-02-13 PROCEDURE — 70450 CT HEAD/BRAIN W/O DYE: CPT

## 2024-02-13 RX ORDER — ACETAMINOPHEN 325 MG/1
650 TABLET ORAL ONCE
Status: COMPLETED | OUTPATIENT
Start: 2024-02-13 | End: 2024-02-13

## 2024-02-13 RX ADMIN — ACETAMINOPHEN 650 MG: 325 TABLET ORAL at 19:09

## 2024-02-13 ASSESSMENT — PAIN SCALES - GENERAL: PAINLEVEL_OUTOF10: 0

## 2024-02-13 NOTE — TELEPHONE ENCOUNTER
Received message from patient's daughter with an update that she has called [facility] Pain management and LMTCB.

## 2024-02-13 NOTE — TELEPHONE ENCOUNTER
From: Chana SALAZAR  To: Johnny Magaña  Sent: 2/12/2024 10:27 AM CST  Subject: Referral To Pain Management    Dear Kenneth-    We are reaching out to confirm if you had received our message about contacting the Pain Management office.    You may call [facility] Pain Management to schedule an appointment: [phone]    If you have any questions or concerns, please contact our office at [phone] #[phone] or via [employer] message.     Thank you and take care-    Clinical Staff Nurse  Neurosurgery, Neuro Interventional Radiology   [facility] [place]  [email or web address]

## 2024-02-14 ENCOUNTER — TELEPHONE (OUTPATIENT)
Dept: INTERNAL MEDICINE CLINIC | Facility: CLINIC | Age: 89
End: 2024-02-14

## 2024-02-14 LAB
ATRIAL RATE (BPM): 250
QRS-INTERVAL (MSEC): 108
QT-INTERVAL (MSEC): 476
QTC: 447
R AXIS (DEGREES): -49
REPORT TEXT: NORMAL
T AXIS (DEGREES): 90
VENTRICULAR RATE EKG/MIN (BPM): 53

## 2024-02-14 NOTE — TELEPHONE ENCOUNTER
Daughter Chata (on MICKEY) scheduled for an ER follow up video visit, please advise if this is ok, difficult for patient to get to appointment due to mobility issues.    Patient was walking to the bathroom yesterday became weak, daughter assisted him to the floor then called 911. He went to ER at [facility], got \"checked\" out and discharged home.    Patient already has Home Health, PT and Palliative Care set up for a consult next week.     Daughter wants to also discuss that patient wants to go home to his own house. Daughter in process of lining up full time care for patient , putting in grab bars/ramp etc at patient's home.

## 2024-02-15 ENCOUNTER — TELEMEDICINE (OUTPATIENT)
Facility: CLINIC | Age: 89
End: 2024-02-15
Payer: MEDICARE

## 2024-02-15 DIAGNOSIS — F03.918 DEMENTIA WITH OTHER BEHAVIORAL DISTURBANCE, UNSPECIFIED DEMENTIA SEVERITY, UNSPECIFIED DEMENTIA TYPE (HCC): ICD-10-CM

## 2024-02-15 DIAGNOSIS — G89.29 CHRONIC LEFT HIP PAIN: ICD-10-CM

## 2024-02-15 DIAGNOSIS — M25.552 CHRONIC LEFT HIP PAIN: ICD-10-CM
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
## 2024-02-15 PROCEDURE — 99214 OFFICE O/P EST MOD 30 MIN: CPT | Performed by: INTERNAL MEDICINE

## 2024-02-15 NOTE — TELEPHONE ENCOUNTER
Patient already has telemedicine appointment for today..    Future Appointments   Date Time Provider Department Center   2/15/2024  4:30 PM Kenton Zarate MD [facility]   2/20/2024  1:00 PM Pushpa Escalante APRN EM PAIN [facility]   3/20/2024  1:30 PM Kenton Zarate MD [facility]   5/21/2024  1:00 PM Rocco Sim DPM [facility]  [place]

## 2024-02-16 ENCOUNTER — TELEPHONE (OUTPATIENT)
Dept: INTERNAL MEDICINE CLINIC | Facility: CLINIC | Age: 89
End: 2024-02-16

## 2024-02-16 NOTE — TELEPHONE ENCOUNTER
Per daughter of patient, patient needs a Hospice Order at [facility] Tel# [phone] Fax# [phone]. Per daughter they have already the POA signed and she will send copy.

## 2024-02-17 LAB
BACTERIA BLD CULT: NORMAL
BACTERIA BLD CULT: NORMAL

## 2024-02-17 NOTE — TELEPHONE ENCOUNTER
Isn't Dr Kenton Zarate covered by the on call person for that group and not for the main call group?

## 2024-02-17 NOTE — TELEPHONE ENCOUNTER
Patient's daughter Chata called (on MICKEY), verified patient's Name and . She is following up on hospice order. States she discussed this with Dr. Kenton Zarate on 2/15 and decided to move forward with hospice care for the patient. Also see  - Patient Message.    Sending to Dr. Macario - podmate, for Dr. Kenton Zarate, please advise. Daughter cannot wait until Monday for PCP's response and would like to address request with covering provider.

## 2024-02-18 LAB
BACTERIA BLD CULT: NORMAL
BACTERIA BLD CULT: NORMAL

## 2024-02-18 NOTE — TELEPHONE ENCOUNTER
If family prefers hospice care, I am fine with that.  We can put a referral for residential hospice or hospice of their choice.

## 2024-02-18 NOTE — PROGRESS NOTES
Virtual Virtual Check-In    Johnny Magaña verbally consents to a Virtual Video Check-In service on 02/18/24. Patient understands and accepts financial responsibility for any deductible, co-insurance and/or co-pays associated with this service. This visit is conducted using Telemedicine with live, interactive video and audio.     I spoke with Johnny Magaña by secure video chat, verified date of birth, and discussed their current concerns:   Duration: 20 minutes    HPI  89-year-old gentleman along with daughter Ivette requesting a telemedicine consultation for discussion regarding his ongoing care.  He has deteriorated significantly.  Dementia is getting worse along with the delirium.  He is not able to ambulate at all without any help.  he is requiring 24/7 care.  Quality of life is getting better as well.  Family is debating about palliative versus hospice care.  Patient has 3 daughters and they will discuss among themselves and will come up with a plan.    He is at very high risk for fall.  He needs 24/7 care.    Review of Systems:   Review of Systems   Review of system is limited because of patient's dementia and delirium.    Reviewed Active Problems:  Patient Active Problem List    Diagnosis    History of total left hip replacement    Congestive heart failure, unspecified HF chronicity, unspecified heart failure type (HCC)    Chronic left hip pain    Trochanteric bursitis of left hip    Gouty arthritis    Chronic pancreatitis (HCC)    BPH (benign prostatic hyperplasia)    Vascular dementia (HCC)    Bilateral hearing loss    Atrial flutter, unspecified type (HCC)    Pneumonitis due to inhalation of food and vomit (HCC)    Unilateral primary osteoarthritis, right knee    Elevated white blood cell count, unspecified    Spinal stenosis, lumbar region, with neurogenic claudication    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    Basal cell carcinoma (BCC) of helix of right ear    Aortic valve stenosis      Follows up with Dr Warren      Primary osteoarthritis of left knee    Chondrocalcinosis articularis    Atherosclerosis of native coronary artery of native heart with angina pectoris (HCC)    Atrial fibrillation (HCC)    Chronic left-sided low back pain with left-sided sciatica    Spinal stenosis of lumbar region with neurogenic claudication    History of total hip arthroplasty, left    Actinic keratosis    Neoplasm of uncertain behavior of skin    Personal history of skin cancer    Primary osteoarthritis of right hip    Hypertension    Alcoholic cirrhosis of liver without ascites (HCC)    Sensorineural hearing loss, bilateral    Thrombocytopenia (HCC)    Hypercholesterolemia      Reviewed Past Medical History:  Past Medical History:   Diagnosis Date    Acute bilateral low back pain without sciatica 8/25/2022    Acute gastric ulcer without hemorrhage or perforation     Acute on chronic pancreatitis (HCC) 9/21/2021    Aspiration pneumonia (HCC) 11/11/2020    Atrial fibrillation (HCC)     BCC (basal cell carcinoma) 2019    right ear pinna    Bilateral inguinal hernia     Cancer (HCC) 2010, 2019, 2020    partial nephrectomy/skin cancers    Cancer of kidney (HCC) 2005    Carotid bruit 4/16/2019    Cholecystitis 2013    Cirrhosis, cryptogenic (HCC) 2013    Essential hypertension     controlled w/medication    Heart disease     aortic stenosis; Afib, coronary artery disease    Hemarthrosis of left knee 6/4/2019    High blood pressure     High cholesterol     Hip arthritis 2000    Hyperlipidemia     Keratoacanthoma type squamous cell carcinoma of skin 03/2020    L forearm    Mixed hyperlipidemia 9/14/2015    Pancreatitis 07/23/2021    Persistent atrial fibrillation (HCC) 3/28/2019    SCC (squamous cell carcinoma) 2019    right forehead - SCC in situ    SCC (squamous cell carcinoma) 05/2020    L forearm x 2    Skin lesion of right arm 2/18/2020    Stenosis of aorta     per daughter    Viral upper respiratory tract infection  2022      Reviewed Family History:  Family History   Problem Relation Age of Onset    Heart Attack Mother         CHF/CRF (cause of death)    Diabetes Mother     Hypertension Mother     Heart Disease Sister         CAD (cause of death)    Heart Attack Brother         CHF (cause of death)    Cancer Brother 72        stomach     Lipids Other         family h/o hyperlipidemia and vascular disease       Reviewed Social History:  Social History     Socioeconomic History    Marital status:     Number of children: 3   Occupational History    Occupation: Director/manager bicycle [employer]     Comment: [employer]   Tobacco Use    Smoking status: Former     Packs/day: 0.50     Years: 30.00     Additional pack years: 0.00     Total pack years: 15.00     Types: Cigarettes     Quit date: 1987     Years since quittin.5    Smokeless tobacco: Never    Tobacco comments:     light smoker in his 20s, weaned himself to 1 per day till  quit   Vaping Use    Vaping Use: Never used   Substance and Sexual Activity    Alcohol use: No    Drug use: No    Sexual activity: Not Currently   Other Topics Concern    Caffeine Concern Yes     Comment: coffee, 2 cups    Reaction to local anesthetic No      Reviewed Current Medications:  Current Outpatient Medications   Medication Sig Dispense Refill    methylPREDNISolone 4 MG Oral Tablet Therapy Pack 1 package as directed. 21 tablet 1    MEDROL 4 MG Oral Tablet Therapy Pack As directed. 1 each 0    polyethylene glycol, PEG 3350, 17 g Oral Powd Pack Take 17 g by mouth as needed (constipation).      omeprazole 20 MG Oral Capsule Delayed Release Take 1 capsule (20 mg total) by mouth before breakfast. 90 capsule 3    Potassium Chloride ER 20 MEQ Oral Tab CR Take 1 tablet by mouth every morning. 90 tablet 3    allopurinol 300 MG Oral Tab Take 1 tablet (300 mg total) by mouth daily. 90 tablet 3    tamsulosin 0.4 MG Oral Cap Take 1 capsule (0.4 mg total) by mouth daily.  (Patient taking differently: Take 1 capsule (0.4 mg total) by mouth nightly.) 90 capsule 3    Lovastatin 40 MG Oral Tab Take 1 tablet (40 mg total) by mouth nightly. 90 tablet 3    bumetanide 0.5 MG Oral Tab Take 1 tablet (0.5 mg total) by mouth 2 (two) times daily.      amLODIPine Besylate 2.5 MG Oral Tab Take 1 tablet (2.5 mg total) by mouth daily as needed. Only take if SBP is above 150      apixaban 5 MG Oral Tab Take 1 tablet (5 mg total) by mouth 2 (two) times daily. 60 tablet 11    Multiple Vitamins-Minerals (CENTRUM SILVER ULTRA MENS) Oral Tab Take 1 tablet by mouth daily.              Physical Exam  There were no vitals filed for this visit.  Physical Exam   Constitutional:       General: He is not in acute distress.     Appearance: Normal appearance.   HENT:      Head: Normocephalic and atraumatic.    Neurological:      Mental Status: He is alert and send hello to me.  He did not recognize me.  Psychiatric:         I am not able to assess his mental status.  He knows his name.      Diagnosis:  1. Fall, sequela    2. Dementia with other behavioral disturbance, unspecified dementia severity, unspecified dementia type (HCC)    3. Chronic left hip pain       Plan: He does have significant medical comorbidities along with high risk of fall with poor quality of life will make him at high risk for deterioration.  He is palliative/hospice care eligible.  Family is also thinking in that route.  He has 3 daughters.  They will discuss and will come up with a plan.  Meanwhile, he needs 24/7 care.    Follow up: No follow-ups on file.    Please note that the following visit was completed using two-way, real-time interactive audio and/or video communication.  This has been done in good ross to provide continuity of care in the best interest of the provider-patient relationship, due to the ongoing public health crisis/national emergency and because of restrictions of visitation.  There are limitations of this visit as no  physical exam could be performed.  Every conscious effort was taken to allow for sufficient and adequate time.  This billing was spent on reviewing labs, medications, radiology tests and decision making.  Appropriate medical decision-making and tests are ordered as detailed in the plan of care above. Johnny Magaña understands video evaluation is not a substitute for in person examination or emergency care. Patient advised to go to ER or call 911 for worsening symptoms or acute distress.     This note was prepared using Dragon Medical voice recognition dictation software. As a result errors may occur. When identified these errors have been corrected. While every attempt is made to correct errors during dictation discrepancies may still exist.  Kenton Zarate MD

## 2024-02-19 ENCOUNTER — TELEPHONE (OUTPATIENT)
Dept: INTERNAL MEDICINE CLINIC | Facility: CLINIC | Age: 89
End: 2024-02-19

## 2024-02-19 ENCOUNTER — HOME HEALTH CHARGES (OUTPATIENT)
Dept: INTERNAL MEDICINE CLINIC | Facility: CLINIC | Age: 89
End: 2024-02-19

## 2024-02-19 ENCOUNTER — LAB ENCOUNTER (OUTPATIENT)
Dept: LAB | Age: 89
End: 2024-02-19
Attending: INTERNAL MEDICINE
Payer: MEDICARE

## 2024-02-19 DIAGNOSIS — K70.30 ALCOHOLIC CIRRHOSIS OF LIVER WITHOUT ASCITES (HCC): ICD-10-CM

## 2024-02-19 DIAGNOSIS — R39.9 UTI SYMPTOMS: Primary | ICD-10-CM

## 2024-02-19 DIAGNOSIS — M25.552 LEFT HIP PAIN: Primary | ICD-10-CM

## 2024-02-19 DIAGNOSIS — G47.00 INSOMNIA, UNSPECIFIED TYPE: ICD-10-CM

## 2024-02-19 DIAGNOSIS — M48.062 SPINAL STENOSIS OF LUMBAR REGION WITH NEUROGENIC CLAUDICATION: ICD-10-CM

## 2024-02-19 DIAGNOSIS — F01.50 VASCULAR DEMENTIA, UNCOMPLICATED (HCC): ICD-10-CM

## 2024-02-19 LAB
BILIRUB UR QL CFM: NEGATIVE
CLARITY UR: CLEAR
COLOR UR: YELLOW
GLUCOSE UR-MCNC: NEGATIVE MG/DL
KETONES UR-MCNC: NEGATIVE MG/DL
LEUKOCYTE ESTERASE UR QL STRIP.AUTO: NEGATIVE
NITRITE UR QL STRIP.AUTO: NEGATIVE
PH UR: 7 [PH] (ref 5–8)
PROT UR-MCNC: >=300 MG/DL
SP GR UR STRIP: 1.02 (ref 1–1.03)
UROBILINOGEN UR STRIP-ACNC: >=8

## 2024-02-19 PROCEDURE — 81001 URINALYSIS AUTO W/SCOPE: CPT | Performed by: INTERNAL MEDICINE

## 2024-02-19 PROCEDURE — 81015 MICROSCOPIC EXAM OF URINE: CPT | Performed by: INTERNAL MEDICINE

## 2024-02-19 RX ORDER — ZOLPIDEM TARTRATE 5 MG/1
5 TABLET ORAL NIGHTLY PRN
Qty: 30 TABLET | Refills: 0 | Status: SHIPPED | OUTPATIENT
Start: 2024-02-19

## 2024-02-19 NOTE — TELEPHONE ENCOUNTER
Noted that patient's daughter has messaged reporting that she was told a referral to [facility] is not able to be located in patient's chart.     Appointment is tomorrow, 2.20.24.     Routed to ELDON Hernandez for possible referral in chart.

## 2024-02-19 NOTE — TELEPHONE ENCOUNTER
Yes, patient has a referral from my previous office. Went ahead and placed order in [facility].

## 2024-02-19 NOTE — TELEPHONE ENCOUNTER
Spoke to patient daughter Chata.     Chata stating ever since he got released from ER and removing cathter patient urine is dark and not urinating frequently even though he drinks plenty of fluids. Chata is asking to drop off a urine sample due to patient urine is dark color and reduced urination at [facility] lab. Daughter does have sterile cups.       Daughter is asking for sleep aid? Patient has not slept more then 2 hours asking for prescription or recommend OTC?      Please advise  Pended order for UA sample

## 2024-02-19 NOTE — TELEPHONE ENCOUNTER
Patient's daughter, Chata, calling with condition update.   States patient has been exhibiting delirium, agitation, and not sleeping well since Friday.  States hospice wont be able to see patient until tomorrow.  Daughter states patient's caregiver has recommended a U/A test due to the symptoms he is showing. Daughter states he is declining fast, and would like an order for urine testing.   Advised message would be sent to provider for further review and recommendations.

## 2024-02-20 NOTE — PROGRESS NOTES
This is the Initial  home health certification. [facility]  SOC: 02/09/2024  Dates of service are for 02/09/2024-04/08/2024     See scanned reports for plan of care     Order# [id number]

## 2024-02-22 ENCOUNTER — TELEPHONE (OUTPATIENT)
Dept: PAIN CLINIC | Facility: HOSPITAL | Age: 89
End: 2024-02-22

## 2024-03-04 ENCOUNTER — MED REC SCAN ONLY (OUTPATIENT)
Dept: INTERNAL MEDICINE CLINIC | Facility: CLINIC | Age: 89
End: 2024-03-04

## 2024-03-13 ENCOUNTER — TELEPHONE (OUTPATIENT)
Dept: INTERNAL MEDICINE CLINIC | Facility: CLINIC | Age: 89
End: 2024-03-13

## 2024-03-13 NOTE — TELEPHONE ENCOUNTER
Patient's daughter Chata (on MICKEY) called to let  know that patient passed away Friday, March 8, 2024 peacefully at home with hospice.

## 2024-10-21 NOTE — TELEPHONE ENCOUNTER
Problem: Discharge Planning  Goal: Discharge to home or other facility with appropriate resources  10/20/2024 2128 by Anayeli Faustin, RN  Outcome: Progressing     Problem: Pain  Goal: Verbalizes/displays adequate comfort level or baseline comfort level  10/20/2024 2128 by Anayeli Faustin, RN  Outcome: Progressing     Problem: Safety - Adult  Goal: Free from fall injury  10/20/2024 2128 by Anayeli Faustin, RN  Outcome: Progressing     Problem: Nutrition Deficit:  Goal: Optimize nutritional status  10/20/2024 2128 by Anayeli Faustin, RN  Outcome: Progressing      Routed to Dr Hernando Clifton for advise, ps confirm care giver status, thanks.

## (undated) DIAGNOSIS — E87.6 HYPOKALEMIA: ICD-10-CM

## (undated) DIAGNOSIS — I50.9 CONGESTIVE HEART FAILURE, UNSPECIFIED HF CHRONICITY, UNSPECIFIED HEART FAILURE TYPE (HCC): Primary | ICD-10-CM

## (undated) DIAGNOSIS — N39.0 URINARY TRACT INFECTION WITHOUT HEMATURIA, SITE UNSPECIFIED: Primary | ICD-10-CM

## (undated) NOTE — Clinical Note
Pt currently does not have his HFU appt scheduled. TE sent to the call center to FU on an appt. Dtr stated the pt is doing better then he has in a long time. Dtr stated he is eating and drinking well. He is walking with a walker.  Pt stated [facility] services fo

## (undated) NOTE — MR AVS SNAPSHOT
[address] [address]  [phone]               Thank you for choosing us for your health care visit with Madiha Jama MD.  We are glad to serve you and happy to provide you with this s Where to Get Your Medications      These medications were sent to [facility] [address], [address] [phone], [phone]  [address] [place] [id number]     Phone:  [phone]    - tamsulosin HCl 0.4 MG Caps

## (undated) NOTE — LETTER
04/06/18        Galshanna Mcgill [address] [address]      Dear Gideon Short records indicate that you have outstanding lab work and or testing that was ordered for you and has not yet been completed:          CBC W Differential W Platelet [

## (undated) NOTE — LETTER
11/23/21        Lucio Avendano  [address] [address]      Dear Mary Leary records indicate that you have outstanding lab work and or testing that was ordered for you and has not yet been completed:   MRI ABDOMEN (W+WO) (ELB=[id number])

## (undated) NOTE — LETTER
AUTHORIZATION FOR SURGICAL OPERATION OR OTHER PROCEDURE    1.  I hereby authorize Dr. Randee Cotton, and [facility] staff assigned to my case to perform the following operation and/or procedure at the [facility]:    ________________________________ Time:  ________ A. M.  P.M.        Patient Name:  ______________________________________________________  (please print)      Patient signature:  ___________________________________________________             Relationship to Patient:

## (undated) NOTE — Clinical Note
Dr. Herrera Blanchard, . Jessie Cunha is doing very well. He has no acute pain or other upper or lower GI issues. His alk phos still is a bit elevated (265) but he has improved. I am going to redraw labs in 6 weeks to reevaluate.   Given his age, would you recommend con

## (undated) NOTE — Clinical Note
2/1/2017              Zechariah Rosales         To whom it may concern:    Please provide above named patient with a zostavax vaccine for shingles prevention.       Sincerely,        Michele Kearns MD

## (undated) NOTE — LETTER
3/9/2021              Deven Martinez        [address] [place] [address]  is a current patient of mine and his daughter Ferdinand Mckay is currently his caretaker.  Please allow he

## (undated) NOTE — LETTER
Kanchan Hicks, [address]       03/12/19        Patient: David Yeboah   YOB: 1926   Date of Visit: 3/12/2019       Dear  Dr. Bora Cruz MD,      Thank you for referring David Yeboah to my practice.   Please

## (undated) NOTE — ED AVS SNAPSHOT
Ayana Medrano   MRN: [id number]    Department:  [facility] Emergency Department   Date of Visit:  2/14/2019           Disclosure     Insurance plans vary and the physician(s) referred by the ER may not be covered by your plan.  Please contact yo within the next three months to obtain basic health screening including reassessment of your blood pressure.     IF THERE IS ANY CHANGE OR WORSENING OF YOUR CONDITION, CALL YOUR PRIMARY CARE PHYSICIAN AT ONCE OR RETURN IMMEDIATELY TO THE EMERGENCY DEPARTMEN

## (undated) NOTE — LETTER
3/4/2021          To Whom It May Concern: [facility] Department of [address] ,[place] 4/6/1926,is currently under the care of Dr. Trenton Pierre at the [facility] clinic. Mr. Wilkins Seen daughter, Ms Ekaterina Forte, is his caregiver.   If

## (undated) NOTE — ED AVS SNAPSHOT
Silvina Pate   MRN: [id number]    Department:  [facility] Emergency Department   Date of Visit:  5/28/2019           Disclosure     Insurance plans vary and the physician(s) referred by the ER may not be covered by your plan.  Please contact yo within the next three months to obtain basic health screening including reassessment of your blood pressure.     IF THERE IS ANY CHANGE OR WORSENING OF YOUR CONDITION, CALL YOUR PRIMARY CARE PHYSICIAN AT ONCE OR RETURN IMMEDIATELY TO THE EMERGENCY DEPARTMEN

## (undated) NOTE — MR AVS SNAPSHOT
[address] [address]  [phone]               Thank you for choosing us for your health care visit with Tammie Barlow MD.  We are glad to serve you and happy to provide you with this s TENORMIN 25 MG Tabs   Generic drug:  atenolol   Take  by mouth. * Notice: This list has 2 medication(s) that are the same as other medications prescribed for you.  Read the directions carefully, and ask your doctor or other care provider to revi